# Patient Record
Sex: FEMALE | Race: BLACK OR AFRICAN AMERICAN | Employment: OTHER | URBAN - METROPOLITAN AREA
[De-identification: names, ages, dates, MRNs, and addresses within clinical notes are randomized per-mention and may not be internally consistent; named-entity substitution may affect disease eponyms.]

---

## 2017-03-31 ENCOUNTER — OFFICE VISIT (OUTPATIENT)
Dept: INTERNAL MEDICINE CLINIC | Age: 67
End: 2017-03-31

## 2017-03-31 VITALS
HEIGHT: 66 IN | DIASTOLIC BLOOD PRESSURE: 74 MMHG | BODY MASS INDEX: 47.09 KG/M2 | RESPIRATION RATE: 17 BRPM | SYSTOLIC BLOOD PRESSURE: 132 MMHG | TEMPERATURE: 96.8 F | HEART RATE: 64 BPM | WEIGHT: 293 LBS | OXYGEN SATURATION: 98 %

## 2017-03-31 DIAGNOSIS — I27.82 OTHER CHRONIC PULMONARY EMBOLISM: Primary | ICD-10-CM

## 2017-03-31 DIAGNOSIS — I10 ESSENTIAL HYPERTENSION: ICD-10-CM

## 2017-03-31 DIAGNOSIS — M06.9 RHEUMATOID ARTHRITIS, INVOLVING UNSPECIFIED SITE, UNSPECIFIED RHEUMATOID FACTOR PRESENCE: ICD-10-CM

## 2017-03-31 RX ORDER — METOPROLOL TARTRATE 50 MG/1
TABLET ORAL 2 TIMES DAILY
COMMUNITY
Start: 2016-12-27

## 2017-03-31 RX ORDER — TRAMADOL HYDROCHLORIDE 50 MG/1
50 TABLET ORAL
Refills: 0 | COMMUNITY
Start: 2017-03-03 | End: 2018-01-29

## 2017-03-31 RX ORDER — FOLIC ACID 1 MG/1
TABLET ORAL
COMMUNITY
Start: 2016-12-30 | End: 2017-07-31 | Stop reason: ALTCHOICE

## 2017-03-31 RX ORDER — METHOTREXATE 2.5 MG/1
12.5 TABLET ORAL
COMMUNITY
End: 2018-01-08 | Stop reason: SDUPTHER

## 2017-03-31 RX ORDER — HYDROCODONE BITARTRATE AND ACETAMINOPHEN 7.5; 325 MG/1; MG/1
TABLET ORAL
Refills: 0 | COMMUNITY
Start: 2017-03-04 | End: 2017-07-31 | Stop reason: ALTCHOICE

## 2017-03-31 RX ORDER — FAMOTIDINE 20 MG/1
TABLET, FILM COATED ORAL 2 TIMES DAILY
COMMUNITY
Start: 2017-02-27 | End: 2018-06-18

## 2017-03-31 RX ORDER — PREDNISONE 10 MG/1
10 TABLET ORAL DAILY
COMMUNITY
Start: 2017-02-25 | End: 2017-10-09 | Stop reason: SDUPTHER

## 2017-03-31 NOTE — PROGRESS NOTES
Subjective:     Chief Complaint   Patient presents with   [address]        She  is a 61y.o. year old female with h/o HTN, PE, RA, chronic pain who presents today as a new patient to hospitals. She reports that she was diagnosed with PE two months ago. She was on coumadin initially but last week she was started on Xeralto by her last PCP. She is also diagnosed with RA years ago. Seen a rheumatologist in the past. Currently on methotrexate 2.5 mg and prednisone 10 mg. She mentioned that her PCP was managing her RA for few years. Methotrexate was not adjusted rather she was given percocet and tramadol. She has been taking Tramadol sometimes 6 tabs/day. BP has been well controlled with metoprolol. Strongly advised patient to obtain records from her last PCP, recent hospital visits as well as Rheumatologist.     Beside pain in her back she says she has been feeling fine. A comprehensive review of systems was negative except for that written in the HPI.   Objective:     Vitals:    03/31/17 1049   BP: 132/74   Pulse: 64   Resp: 17   Temp: 96.8 °F (36 °C)   TempSrc: Oral   SpO2: 98%   Weight: 297 lb (134.7 kg)   Height: 5' 6\" (1.676 m)       Physical Examination: General appearance - alert, well appearing, and in no distress, oriented to person, place, and time and overweight  Mental status - alert, oriented to person, place, and time, normal mood, behavior, speech, dress, motor activity, and thought processes  Chest - clear to auscultation, no wheezes, rales or rhonchi, symmetric air entry  Heart - normal rate, regular rhythm, normal S1, S2, no murmurs, rubs, clicks or gallops  Neurological - alert, oriented, normal speech, no focal findings or movement disorder noted    No Known Allergies   Social History     Social History    Marital status:      Spouse name: N/A    Number of children: N/A    Years of education: N/A     Social History Main Topics    Smoking status: Never Smoker    Smokeless tobacco: Never Used    Alcohol use No    Drug use: No    Sexual activity: Not Asked     Other Topics Concern    None     Social History Narrative    None      No family history on file. Past Surgical History:   Procedure Laterality Date    HX KNEE REPLACEMENT Bilateral     HX TUBAL LIGATION        Past Medical History:   Diagnosis Date    Anemia     Hypertension     Osteoarthritis     Pulmonary embolism (HCC)     [facility]       Current Outpatient Prescriptions   Medication Sig Dispense Refill    folic acid (FOLVITE) 1 mg tablet       metoprolol tartrate (LOPRESSOR) 50 mg tablet       predniSONE (DELTASONE) 10 mg tablet       famotidine (PEPCID) 20 mg tablet       ferrous sulfate 140 mg (45 mg iron) TbER ER tablet TK 1 T PO  BID  0    traMADol (ULTRAM) 50 mg tablet   0    HYDROcodone-acetaminophen (NORCO) 7.5-325 mg per tablet take 1 tablet by mouth every 6 hours if needed  0    rivaroxaban (XARELTO) 15 mg tab tablet Take  by mouth daily.  methotrexate (RHEUMATREX) 2.5 mg tablet Take  by mouth. Assessment/ Plan:   Mc Granados was seen today for establish care. Diagnoses and all orders for this visit:    Other chronic pulmonary embolism ([facility]     -- she is currently on Xeralto. Essential hypertension      - well controled. Continue current med. Rheumatoid arthritis, involving unspecified site, unspecified rheumatoid factor presence ([facility]      -  I strongly advised patient to see her rheumatologist as soon as possible. She couldn't recall the name of the physician. Chronic pain: discussed that methotrexate might need to be adjusted to help better control with pain. She agreed with me. She and her daughter agreed to make an appointment with rheumatologist as soon as possible. Will gather all med records and go form there. Medication risks/benefits/costs/interactions/alternatives discussed with patient.   Advised patient to call back or return to office if symptoms worsen/change/persist. If patient cannot reach us or should anything more severe/urgent arise he/she should proceed directly to the nearest emergency department. Discussed expected course/resolution/complications of diagnosis in detail with patient. Patient given a written after visit summary which includes her diagnoses, current medications and vitals. Patient expressed understanding with the diagnosis and plan. Follow-up Disposition:  Return in about 1 month (around 4/30/2017).

## 2017-03-31 NOTE — MR AVS SNAPSHOT
Visit Information Date & Time Provider Department Dept. Phone Encounter #  
 3/31/2017 10:45 AM Daniel Bradley MD [facility] Internal Medicine [phone] [id number] Follow-up Instructions Return in about 1 month (around 4/30/2017). Upcoming Health Maintenance Date Due DTaP/Tdap/Td series (1 - Tdap) 10/2/1973 PAP AKA CERVICAL CYTOLOGY 10/2/1973 BREAST CANCER SCRN MAMMOGRAM 10/2/2002 FOBT Q 1 YEAR AGE 50-75 10/2/2002 ZOSTER VACCINE AGE 60> 10/2/2012 INFLUENZA AGE 9 TO ADULT 8/1/2016 Allergies as of 3/31/2017  Review Complete On: 3/31/2017 By: Patricio Chapman MD  
 No Known Allergies Current Immunizations  Never Reviewed No immunizations on file. Not reviewed this visit You Were Diagnosed With   
  
 Codes Comments Other chronic pulmonary embolism ([facility]    -  Primary ICD-10-CM: I27.82 Essential hypertension     ICD-10-CM: I10 
ICD-9-CM: 401.9 Vitals BP Pulse Temp Resp Height(growth percentile) Weight(growth percentile) 132/74 (BP 1 Location: Left arm, BP Patient Position: Sitting) 64 96.8 °F (36 °C) (Oral) 17 5' 6\" (1.676 m) 297 lb (134.7 kg) SpO2 BMI OB Status Smoking Status 98% 47.94 kg/m2 Postmenopausal Never Smoker Vitals History BMI and BSA Data Body Mass Index Body Surface Area  
 47.94 kg/m 2 2.5 m 2 Preferred Pharmacy Pharmacy Name Phone [facility], [address] [place] [address] [facility] [phone] Your Updated Medication List  
  
   
This list is accurate as of: 3/31/17 11:27 AM.  Always use your most recent med list.  
  
  
  
  
 famotidine 20 mg tablet Commonly known as:  PEPCID  
  
 ferrous sulfate 140 mg (45 mg iron) Tber ER tablet TK 1 T PO  BID  
  
 folic acid 1 mg tablet Commonly known as:  [place] HYDROcodone-acetaminophen 7.5-325 mg per tablet Commonly known as:  NORCO  
take 1 tablet by mouth every 6 hours if needed methotrexate 2.5 mg tablet Commonly known as:  Cecille Bullocks Take  by mouth.  
  
 metoprolol tartrate 50 mg tablet Commonly known as:  LOPRESSOR  
  
 predniSONE 10 mg tablet Commonly known as:  DELTASONE  
  
 traMADol 50 mg tablet Commonly known as:  ULTRAM  
  
 XARELTO 15 mg Tab tablet Generic drug:  rivaroxaban Take  by mouth daily. Follow-up Instructions Return in about 1 month (around 4/30/2017). Introducing [facility] SERVICES! [employer] introduces Edgeio patient portal. Now you can access parts of your medical record, email your doctor's office, and request medication refills online. 1. In your internet browser, go to [email or web address] 2. Click on the First Time User? Click Here link in the Sign In box. You will see the New Member Sign Up page. 3. Enter your Edgeio Access Code exactly as it appears below. You will not need to use this code after youve completed the sign-up process. If you do not sign up before the expiration date, you must request a new code. · Edgeio Access Code: [email or web address] Expires: 6/29/2017 11:26 AM 
 
4. Enter the last four digits of your Social Security Number (xxxx) and Date of Birth (mm/dd/yyyy) as indicated and click Submit. You will be taken to the next sign-up page. 5. Create a Edgeio ID. This will be your Edgeio login ID and cannot be changed, so think of one that is secure and easy to remember. 6. Create a Edgeio password. You can change your password at any time. 7. Enter your Password Reset Question and Answer. This can be used at a later time if you forget your password. 8. Enter your e-mail address. You will receive e-mail notification when new information is available in [address] 9. Click Sign Up. You can now view and download portions of your medical record. 10. Click the Download Summary menu link to download a portable copy of your medical information. If you have questions, please visit the Frequently Asked Questions section of the [employer] website. Remember, [employer] is NOT to be used for urgent needs. For medical emergencies, dial 911. Now available from your iPhone and Android! Please provide this summary of care documentation to your next provider. If you have any questions after today's visit, please call [phone].

## 2017-05-01 ENCOUNTER — TELEPHONE (OUTPATIENT)
Dept: INTERNAL MEDICINE CLINIC | Age: 67
End: 2017-05-01

## 2017-05-01 ENCOUNTER — OFFICE VISIT (OUTPATIENT)
Dept: INTERNAL MEDICINE CLINIC | Age: 67
End: 2017-05-01

## 2017-05-01 VITALS
BODY MASS INDEX: 47.09 KG/M2 | DIASTOLIC BLOOD PRESSURE: 83 MMHG | WEIGHT: 293 LBS | RESPIRATION RATE: 20 BRPM | HEART RATE: 81 BPM | TEMPERATURE: 96.6 F | OXYGEN SATURATION: 95 % | HEIGHT: 66 IN | SYSTOLIC BLOOD PRESSURE: 138 MMHG

## 2017-05-01 DIAGNOSIS — I27.82 OTHER CHRONIC PULMONARY EMBOLISM: Primary | ICD-10-CM

## 2017-05-01 DIAGNOSIS — I10 ESSENTIAL HYPERTENSION: ICD-10-CM

## 2017-05-01 DIAGNOSIS — M06.9 RHEUMATOID ARTHRITIS, INVOLVING UNSPECIFIED SITE, UNSPECIFIED RHEUMATOID FACTOR PRESENCE: Primary | ICD-10-CM

## 2017-05-01 DIAGNOSIS — Z66 DNR (DO NOT RESUSCITATE): ICD-10-CM

## 2017-05-01 DIAGNOSIS — I27.82 OTHER CHRONIC PULMONARY EMBOLISM: ICD-10-CM

## 2017-05-01 RX ORDER — FUROSEMIDE 20 MG/1
20 TABLET ORAL AS NEEDED
COMMUNITY
End: 2017-07-31 | Stop reason: SDUPTHER

## 2017-05-01 RX ORDER — RIVAROXABAN 20 MG/1
TABLET, FILM COATED ORAL
COMMUNITY
Start: 2017-03-29 | End: 2017-07-31 | Stop reason: SDUPTHER

## 2017-05-01 NOTE — TELEPHONE ENCOUNTER
Pt needs a referral for Dr. Alvaro Roland for Hematology, has had 2 PE's    [facility], Appt June 6

## 2017-05-01 NOTE — MR AVS SNAPSHOT
Visit Information Date & Time Provider Department Dept. Phone Encounter #  
 5/1/2017 11:15 AM Daniel Barrera MD [facility] Internal Medicine [phone] [id number] Follow-up Instructions Return in about 3 months (around 8/1/2017). Upcoming Health Maintenance Date Due Hepatitis C Screening 1950 DTaP/Tdap/Td series (1 - Tdap) 10/2/1973 PAP AKA CERVICAL CYTOLOGY 10/2/1973 BREAST CANCER SCRN MAMMOGRAM 10/2/2002 FOBT Q 1 YEAR AGE 50-75 10/2/2002 ZOSTER VACCINE AGE 60> 10/2/2012 INFLUENZA AGE 9 TO ADULT 8/1/2017 Allergies as of 5/1/2017  Review Complete On: 5/1/2017 By: Fallon Olguin MD  
 No Known Allergies Current Immunizations  Never Reviewed No immunizations on file. Not reviewed this visit You Were Diagnosed With   
  
 Codes Comments Rheumatoid arthritis, involving unspecified site, unspecified rheumatoid factor presence ([facility]    -  Primary ICD-10-CM: M06.9 ICD-9-CM: 714.0 DNR (do not resuscitate)     ICD-10-CM: X56 ICD-9-CM: V49.86 Vitals BP Pulse Temp Resp Height(growth percentile) Weight(growth percentile) 138/83 (BP 1 Location: Left arm, BP Patient Position: Sitting) 81 96.6 °F (35.9 °C) (Oral) 20 5' 6\" (1.676 m) 297 lb (134.7 kg) SpO2 BMI OB Status Smoking Status 95% 47.94 kg/m2 Postmenopausal Never Smoker Vitals History BMI and BSA Data Body Mass Index Body Surface Area  
 47.94 kg/m 2 2.5 m 2 Preferred Pharmacy Pharmacy Name Phone [address] [phone] Your Updated Medication List  
  
   
This list is accurate as of: 5/1/17 11:59 AM.  Always use your most recent med list.  
  
  
  
  
 famotidine 20 mg tablet Commonly known as:  PEPCID  
  
 ferrous sulfate 140 mg (45 mg iron) Tber ER tablet TK 1 T PO  BID  
  
 folic acid 1 mg tablet Commonly known as:  FOLVITE  
  
 furosemide 20 mg tablet Commonly known as:  LASIX Take  by mouth daily. HYDROcodone-acetaminophen 7.5-325 mg per tablet Commonly known as:  [place] Luis  
take 1 tablet by mouth every 6 hours if needed  
  
 methotrexate 2.5 mg tablet Commonly known as:  [place]lysses Take  by mouth.  
  
 metoprolol tartrate 50 mg tablet Commonly known as:  LOPRESSOR  
  
 predniSONE 10 mg tablet Commonly known as:  DELTASONE  
  
 traMADol 50 mg tablet Commonly known as:  ULTRAM  
  
 XARELTO 20 mg Tab tablet Generic drug:  rivaroxaban We Performed the Following DO NOT RESUSCITATE Pietro Brandon REFERRAL TO RHEUMATOLOGY [PIE53 Custom] Follow-up Instructions Return in about 3 months (around 8/1/2017). Referral Information Referral ID Referred By Referred To  
  
 [id number] DEVON MONTELONGO MD   
   [address] [address], [address] [place] [address] Road Phone: [phone] Fax: [phone] Visits Status Start Date End Date 1 New Request 5/1/17 5/1/18 If your referral has a status of pending review or denied, additional information will be sent to support the outcome of this decision. Introducing [facility]! [employer]  introduces Swifto patient portal. Now you can access parts of your medical record, email your doctor's office, and request medication refills online. 1. In your internet browser, go to [email or web address] 2. Click on the First Time User? Click Here link in the Sign In box. You will see the New Member Sign Up page. 3. Enter your Swifto Access Code exactly as it appears below. You will not need to use this code after youve completed the sign-up process. If you do not sign up before the expiration date, you must request a new code. · Swifto Access Code: [email or web address] Expires: 6/29/2017 11:26 AM 
 
4.  Enter the last four digits of your Social Security Number (xxxx) and Date of Birth (mm/dd/yyyy) as indicated and click Submit. You will be taken to the next sign-up page. 5. Create a TitanFile ID. This will be your TitanFile login ID and cannot be changed, so think of one that is secure and easy to remember. 6. Create a TitanFile password. You can change your password at any time. 7. Enter your Password Reset Question and Answer. This can be used at a later time if you forget your password. 8. Enter your e-mail address. You will receive e-mail notification when new information is available in [address] 9. Click Sign Up. You can now view and download portions of your medical record. 10. Click the Download Summary menu link to download a portable copy of your medical information. If you have questions, please visit the Frequently Asked Questions section of the TitanFile website. Remember, TitanFile is NOT to be used for urgent needs. For medical emergencies, dial 911. Now available from your iPhone and Android! Please provide this summary of care documentation to your next provider. If you have any questions after today's visit, please call [phone].

## 2017-05-01 NOTE — PROGRESS NOTES
Subjective:     Chief Complaint   Patient presents with    Hypertension        She  is a 61y.o. year old female who presents with her daughter to get referral for rheumatologist as well as for the hematologist as well as follow up on blood pressure. Her past medical history is significant for RA, PE, HTN. In Jan/2017 she was diagnosed with 2 nd PE, started on Xeralto since then. Doing well on that. She has bene following up with hematologist regularly. Next appointment is this Thursday for blood work. She was also diagnosed with RA many years ago at [facility] and after that she was followed by rheumatologist initially but for the past years her PCP been managing her RA with methotrexate 12.5 mg weekly as well as Prednisone 10 mg/day. She is currently on tramadol as well as norco which has bene prescribed by PCP. Patient did see her last PCP 3 days ago to get the refill. Patients's plan is to transfer her care with me as soon as we obtain all her records from PCP. BP has been well controlled with metoprolol. She takes lasix as needed for leg edema. She denies any soa, chest pain, leg swelling, abd pain. Pertinent items are noted in HPI.   Objective:     Vitals:    05/01/17 1123   BP: 138/83   Pulse: 81   Resp: 20   Temp: 96.6 °F (35.9 °C)   TempSrc: Oral   SpO2: 95%   Weight: 297 lb (134.7 kg)   Height: 5' 6\" (1.676 m)       Physical Examination: General appearance - alert, well appearing, and in no distress, oriented to person, place, and time and overweight  Mental status - alert, oriented to person, place, and time, normal mood, behavior, speech, dress, motor activity, and thought processes  Chest - clear to auscultation, no wheezes, rales or rhonchi, symmetric air entry  Heart - normal rate, regular rhythm, normal S1, S2, no murmurs, rubs, clicks or gallops  Extremities - no pedal edema noted    No Known Allergies   Social History     Social History    Marital status:      Spouse name: N/A    Number of children: N/A    Years of education: N/A     Social History Main Topics    Smoking status: Never Smoker    Smokeless tobacco: Never Used    Alcohol use No    Drug use: No    Sexual activity: Not Asked     Other Topics Concern    None     Social History Narrative      History reviewed. No pertinent family history. Past Surgical History:   Procedure Laterality Date    HX KNEE REPLACEMENT Bilateral     HX TUBAL LIGATION        Past Medical History:   Diagnosis Date    Anemia     Hypertension     Osteoarthritis     Pulmonary embolism (HCC)     Rheumatoid arthritis (HCC)       Current Outpatient Prescriptions   Medication Sig Dispense Refill    XARELTO 20 mg tab tablet       furosemide (LASIX) 20 mg tablet Take  by mouth daily.  folic acid (FOLVITE) 1 mg tablet       metoprolol tartrate (LOPRESSOR) 50 mg tablet       predniSONE (DELTASONE) 10 mg tablet       famotidine (PEPCID) 20 mg tablet       traMADol (ULTRAM) 50 mg tablet   0    HYDROcodone-acetaminophen (NORCO) 7.5-325 mg per tablet take 1 tablet by mouth every 6 hours if needed  0    methotrexate (RHEUMATREX) 2.5 mg tablet Take  by mouth.  ferrous sulfate 140 mg (45 mg iron) TbER ER tablet TK 1 T PO  BID  0        Assessment/ Plan:   Charly Andrade was seen today for hypertension. Diagnoses and all orders for this visit:    Rheumatoid arthritis, involving unspecified site, unspecified rheumatoid factor presence ([facility]  -     REFERRAL TO RHEUMATOLOGY  -    Strongly advised patient to have a follow up with rheumatologist for management of her RA and chronic pain. DNR (do not resuscitate)  -     Patient states that she is DNR  -     DO NOT RESUSCITATE    Essential hypertension        -   Well controlled. Continue current med. Other chronic pulmonary embolism (HCC)        - continue Xarelto 20 mg daily. - hematology referral done.          Medication risks/benefits/costs/interactions/alternatives discussed with patient. Advised patient to call back or return to office if symptoms worsen/change/persist. If patient cannot reach us or should anything more severe/urgent arise he/she should proceed directly to the nearest emergency department. Discussed expected course/resolution/complications of diagnosis in detail with patient. Patient given a written after visit summary which includes her diagnoses, current medications and vitals. Patient expressed understanding with the diagnosis and plan. Follow-up Disposition:  Return in about 3 months (around 8/1/2017).

## 2017-06-05 ENCOUNTER — TELEPHONE (OUTPATIENT)
Dept: INTERNAL MEDICINE CLINIC | Age: 67
End: 2017-06-05

## 2017-07-26 ENCOUNTER — TELEPHONE (OUTPATIENT)
Dept: RHEUMATOLOGY | Age: 67
End: 2017-07-26

## 2017-07-31 ENCOUNTER — OFFICE VISIT (OUTPATIENT)
Dept: RHEUMATOLOGY | Age: 67
End: 2017-07-31

## 2017-07-31 ENCOUNTER — OFFICE VISIT (OUTPATIENT)
Dept: INTERNAL MEDICINE CLINIC | Age: 67
End: 2017-07-31

## 2017-07-31 VITALS
RESPIRATION RATE: 18 BRPM | DIASTOLIC BLOOD PRESSURE: 63 MMHG | HEIGHT: 66 IN | HEART RATE: 84 BPM | WEIGHT: 293 LBS | TEMPERATURE: 98.1 F | BODY MASS INDEX: 47.09 KG/M2 | OXYGEN SATURATION: 97 % | SYSTOLIC BLOOD PRESSURE: 126 MMHG

## 2017-07-31 VITALS
BODY MASS INDEX: 47.09 KG/M2 | DIASTOLIC BLOOD PRESSURE: 76 MMHG | WEIGHT: 293 LBS | RESPIRATION RATE: 18 BRPM | HEART RATE: 87 BPM | HEIGHT: 66 IN | TEMPERATURE: 98.6 F | SYSTOLIC BLOOD PRESSURE: 122 MMHG

## 2017-07-31 DIAGNOSIS — Z71.89 DNR (DO NOT RESUSCITATE) DISCUSSION: ICD-10-CM

## 2017-07-31 DIAGNOSIS — Z79.52 LONG TERM (CURRENT) USE OF SYSTEMIC STEROIDS: ICD-10-CM

## 2017-07-31 DIAGNOSIS — Z78.0 POSTMENOPAUSAL: ICD-10-CM

## 2017-07-31 DIAGNOSIS — Z00.00 MEDICARE ANNUAL WELLNESS VISIT, SUBSEQUENT: Primary | ICD-10-CM

## 2017-07-31 DIAGNOSIS — I10 ESSENTIAL HYPERTENSION: ICD-10-CM

## 2017-07-31 DIAGNOSIS — M05.79 SEROPOSITIVE RHEUMATOID ARTHRITIS OF MULTIPLE SITES (HCC): Primary | ICD-10-CM

## 2017-07-31 DIAGNOSIS — Z79.60 LONG-TERM USE OF IMMUNOSUPPRESSANT MEDICATION: ICD-10-CM

## 2017-07-31 DIAGNOSIS — M79.7 FIBROMYALGIA: ICD-10-CM

## 2017-07-31 DIAGNOSIS — M89.9 DISORDER OF BONE: ICD-10-CM

## 2017-07-31 DIAGNOSIS — M79.89 LEG SWELLING: ICD-10-CM

## 2017-07-31 DIAGNOSIS — Z00.00 ROUTINE GENERAL MEDICAL EXAMINATION AT A HEALTH CARE FACILITY: ICD-10-CM

## 2017-07-31 DIAGNOSIS — Z13.39 SCREENING FOR ALCOHOLISM: ICD-10-CM

## 2017-07-31 DIAGNOSIS — E66.01 MORBID OBESITY WITH BMI OF 45.0-49.9, ADULT (HCC): ICD-10-CM

## 2017-07-31 DIAGNOSIS — I27.82 OTHER CHRONIC PULMONARY EMBOLISM: ICD-10-CM

## 2017-07-31 DIAGNOSIS — M06.9 RHEUMATOID ARTHRITIS, INVOLVING UNSPECIFIED SITE, UNSPECIFIED RHEUMATOID FACTOR PRESENCE: ICD-10-CM

## 2017-07-31 DIAGNOSIS — Z79.52 ON PREDNISONE THERAPY: ICD-10-CM

## 2017-07-31 DIAGNOSIS — E66.01 MORBID OBESITY, UNSPECIFIED OBESITY TYPE (HCC): ICD-10-CM

## 2017-07-31 RX ORDER — DIPHENHYDRAMINE HCL 25 MG
25 TABLET ORAL
COMMUNITY
End: 2018-03-15

## 2017-07-31 RX ORDER — BISMUTH SUBSALICYLATE 262 MG
1 TABLET,CHEWABLE ORAL DAILY
COMMUNITY

## 2017-07-31 RX ORDER — HYDROCODONE BITARTRATE AND ACETAMINOPHEN 7.5; 325 MG/1; MG/1
TABLET ORAL
COMMUNITY
End: 2017-07-31

## 2017-07-31 RX ORDER — FUROSEMIDE 20 MG/1
20 TABLET ORAL AS NEEDED
Qty: 90 TAB | Refills: 0 | Status: SHIPPED | OUTPATIENT
Start: 2017-07-31 | End: 2017-08-02 | Stop reason: SDUPTHER

## 2017-07-31 RX ORDER — RIVAROXABAN 20 MG/1
20 TABLET, FILM COATED ORAL
Qty: 90 TAB | Refills: 0 | Status: SHIPPED | OUTPATIENT
Start: 2017-07-31 | End: 2021-04-07 | Stop reason: ALTCHOICE

## 2017-07-31 RX ORDER — FOLIC ACID 1 MG/1
TABLET ORAL DAILY
COMMUNITY
End: 2018-01-08 | Stop reason: SDUPTHER

## 2017-07-31 NOTE — PROGRESS NOTES
This is a Subsequent Medicare Annual Wellness Visit providing Personalized Prevention Plan Services (PPPS) (Performed 12 months after initial AWV and PPPS )    I have reviewed the patient's medical history in detail and updated the computerized patient record. 60 y/o F is here with her daughter Ms. Tressa Pettit for [employer] visit. Her past medical history is significant for PE, RA. She is currently on Xarelto. Has been following up with hematologist regularly. According to patient she was diagnosed with RA many years ago. Not a very good historian. Sometimes she was telling me that her PCP diagnosed her with RA by doing a back Xray and started on medication. In last visit she did tell me that she was diagnosed and managed by rheumatologist years ago and leate managed by her PCP. Daughter did not have the clear info either. She also told me that she was diagnosed with Fibromyalgia. She was on hydrocodone until last month. Takes Tramadol daily for the pain. Prednisone helps with pain especially her both hand pain. She has an appointment with rheumatologist today. History     Past Medical History:   Diagnosis Date    Anemia     Hypertension     Osteoarthritis     Pulmonary embolism (HCC)     Rheumatoid arthritis ([address]       Past Surgical History:   Procedure Laterality Date    HX KNEE REPLACEMENT Bilateral     HX TUBAL LIGATION       Current Outpatient Prescriptions   Medication Sig Dispense Refill    IRON, FERROUS SULFATE, PO Take 65 mg by mouth two (2) times a day.  XARELTO 20 mg tab tablet       furosemide (LASIX) 20 mg tablet Take 20 mg by mouth as needed.  metoprolol tartrate (LOPRESSOR) 50 mg tablet       predniSONE (DELTASONE) 10 mg tablet       famotidine (PEPCID) 20 mg tablet       traMADol (ULTRAM) 50 mg tablet   0    methotrexate (RHEUMATREX) 2.5 mg tablet Take  by mouth. No Known Allergies  History reviewed. No pertinent family history.   Social History Substance Use Topics    Smoking status: Never Smoker    Smokeless tobacco: Never Used    Alcohol use No     Patient Active Problem List   Diagnosis Code    Chronic pulmonary embolism (HCC) I27.82    Essential hypertension I10    Rheumatoid arthritis ([facility] M06.9    DNR (do not resuscitate) Z66    Morbid obesity ([facility] E66.01       Depression Risk Factor Screening:     PHQ over the last two weeks 5/1/2017   Little interest or pleasure in doing things Not at all   Feeling down, depressed or hopeless Not at all   Total Score PHQ 2 0     Alcohol Risk Factor Screening: On any occasion during the past 3 months, have you had more than 3 drinks containing alcohol? No    Do you average more than 7 drinks per week? No        Functional Ability and Level of Safety:     Hearing Loss   none    Activities of Daily Living   Self-care. Requires assistance with: no ADLs    Fall Risk   No flowsheet data found. Abuse Screen   Patient is not abused    Review of Systems   Constitutional: negative for fevers, chills and fatigue  Respiratory: negative for cough, pneumonia or wheezing  Gastrointestinal: negative for nausea, vomiting and change in bowel habits  Genitourinary:negative for frequency, dysuria and urinary incontinence  Musculoskeletal:positive for myalgias, arthralgias, stiff joints, back pain and bone pain    Physical Examination     Evaluation of Cognitive Function:  Mood/affect:  neutral  Appearance: age appropriate and obese, pleasant. Family member/caregiver input: daughter was here with her today. No specific concern. Visit Vitals    /63 (BP 1 Location: Left arm, BP Patient Position: Sitting)    Pulse 84    Temp 98.1 °F (36.7 °C) (Oral)    Resp 18    Ht 5' 6\" (1.676 m)    Wt 305 lb (138.3 kg)    SpO2 97%    BMI 49.23 kg/m2     General appearance: alert, cooperative, no distress, appears stated age  Ears: normal TM's and external ear canals AU  Nose: Nares normal. Septum midline.  Mucosa normal. No drainage or sinus tenderness. Throat: Lips, mucosa, and tongue normal. Teeth and gums normal  Lungs: clear to auscultation bilaterally  Heart: regular rate and rhythm, S1, S2 normal, no murmur, click, rub or gallop  Extremities: edema 2+    Patient Care Team:  Daya Plaza MD (Orthopedic Surgery)  F[place] Hill MD (Rheumatology)  Kristie Escamilla MD (Vascular Surgery)  Rosa M Deleon MD (Hematology and Oncology)    Advice/Referrals/Counseling   Education and counseling provided:  End-of-Life planning (with patient's consent)  Pneumococcal Vaccine  Screening Pap and pelvic (covered once every 2 years)  Colorectal cancer screening tests  Bone mass measurement (DEXA)  Screening for glaucoma      Assessment/Plan       ICD-10-CM ICD-9-CM    1. Medicare annual wellness visit, subsequent Z00.00 V70.0 HEMOGLOBIN A1C WITH EAG      CBC WITH AUTOMATED DIFF      METABOLIC PANEL, COMPREHENSIVE      LIPID PANEL      TSH AND FREE T4   2. On prednisone therapy Z79.52 V58.65 DEXA BONE DENSITY STUDY AXIAL   3. Postmenopausal Z78.0 V49.81 DEXA BONE DENSITY STUDY AXIAL   4. Morbid obesity, unspecified obesity type ([facility] E66.01 278.01 TSH AND FREE T4   5. Routine general medical examination at a health care facility Z00.00 V70.0 HEPATITIS C AB   6. Screening for alcoholism Z13.89 V79.1    7. Leg swelling M79.89 729.81    8. Rheumatoid arthritis, involving unspecified site, unspecified rheumatoid factor presence (HCC) M06.9 714.0 XARELTO 20 mg tab tablet   9. Other chronic pulmonary embolism (HCC) I27.82 416.2      Diagnoses and all orders for this visit:    1. Medicare annual wellness visit, subsequent  -     HEMOGLOBIN A1C WITH EAG  -     CBC WITH AUTOMATED DIFF  -     METABOLIC PANEL, COMPREHENSIVE  -     LIPID PANEL  -     TSH AND FREE T4    2. On prednisone therapy  -     DEXA BONE DENSITY STUDY AXIAL; Future    3. Postmenopausal  -     DEXA BONE DENSITY STUDY AXIAL; Future    4.  Morbid obesity, unspecified obesity type (HCC)  -     TSH AND FREE T4    5. Routine general medical examination at a health care facility  -     HEPATITIS C AB    6. Screening for alcoholism    7. Leg swelling  -     Advised for exercise, low salt diet, leg elevation. compression stockings. -    furosemide (LASIX) 20 mg tablet; Take 1 Tab by mouth as needed. 8. Rheumatoid arthritis, involving unspecified site, unspecified rheumatoid factor presence ([facility]        -  Has an appointment today with new rheumatologist.   9. Other chronic pulmonary embolism ([facility]  -     XARELTO 20 mg tab tablet; Take 1 Tab by mouth daily (with breakfast). 10. DNR (do not resuscitate) discussion  -     DO NOT RESUSCITATE    11. Essential hypertension         -   Continue metoprolol. Follow-up Disposition:  Return in about 6 months (around 1/31/2018). reviewed diet, exercise and weight control  cardiovascular risk and specific lipid/LDL goals reviewed  reviewed medications and side effects in detail.

## 2017-07-31 NOTE — PROGRESS NOTES
REASON FOR VISIT    This is an evaluation for Ms. Nii Katz a 61 y.o.  female for diffuse pain. The patient is referred to the [facility] and [address] [place] [address] at the request of Dr. Debi Griffin. HISTORY OF PRESENT ILLNESS     I have reviewed and summarized old records from [facility] and [facility]    She has a history of bilateral knee replacement. In 2005, she developed diffuse pain. She was diagnosed by Dr. Lacey Suresh with fibromyalgia but she had been diagnosed by [facility] with Rheumatoid Arthritis. For the past 20 years, she has been on methotrexate 12.5 mg every week as per previous PCP for her back pain without relief. She was told she had Rheumatoid Arthritis in her spine by her PCP. She was treated with pain medication. She then saw another rheumatologist who also said had fibromyalgia. She also has trigger finger. She has a family history of Rheumatoid Arthritis in her sister. In 11/29/2013, labs showed rheumatoid factor 25.3. In 4/26/2016, MRI Lumbar Spine with and without contrast showed study is suboptimal secondary to patient's body habitus. T12-L1: Normal for age. L1-L2: Normal for age. L2-L3: There is mild facet joint arthropathy. L3-L4: There is mild disc disease with concentric bulge. Moderate to severe facet arthropathy is present with bony hypertrophy and ligamentous thickening. There is moderate central canal stenosis and lateral recess narrowing. Bilateral foraminal narrowing is present. There is absence of fat within the right neural foramen suggesting involvement of the exiting L3 nerve root. L4-L5: There is mild disc disease with loss of disc height. Moderate to severe facet arthropathy is present with bony hypertrophy and ligamentous thickening. There is moderate central canal stenosis primarily in a transverse direction. Severe lateral recess narrowing is present with moderate bilateral foraminal narrowing.  Absence of fat within the right neural foramen suggests involvement of the exiting right L4 nerve root. L5-S1: There is mild disc degeneration with loss of disc height. Moderate to severe facet arthropathy is present with bony hypertrophy and ligamentous thickening. No significant canal stenosis. There is mild lateral recess narrowing. Bilateral neural foraminal narrowing is also present, right greater than left. Absence of fat within the right neural foramen suggests involvement of the exiting right L5 nerve root. Visualized portions of the sacrum are normal. There is no abnormal enhancement. The conus is normal in contour and signal characteristics. Paraspinal soft tissues are unremarkable. Right hip radiograph showed no fracture or dislocation. The right hip joint spaces normal and symmetric with the left side. Enthesophytes are seen along either iliac crest and there is relatively severe bilateral facet hypertrophy at L5-S1. The sacroiliac joints appear grossly normal. Right Femur radiograph showed the patient is status post prior placement of a 3 component right total knee prosthesis. From this examination a full assessment of the prosthesis is limited. Grossly this examination is negative for a loosening of the prosthetic components, heterotopic ossification, or additional complications of the prosthesis. The joint spaces of the right hip are well maintained without significant osteoarthritis. This examination is negative for a fracture or an insufficiency fracture of the proximal femur. This examination examination is negative for focal lytic or blastic lesions of the right femur. In 1/26/2017, labs showed negative NAVA, beta-2-glycoprotein-1, anti-cardiolipin, ESR 45 mm/hr, CRP 3.80 mg/dL, TSH 0.197, FT3 1.39    In 1/28/2017, CT abdomen and pelvis without contrast showed . there are linear densities at both lung bases suggesting scarring or subsegmental atelectasis. The lung bases are otherwise clear no pleural effusion is seen.  The liver spleen and pancreas are unremarkable. No renal stone or mass is seen. No ureteral dilatation or stone is seen. The urinary bladder is unremarkable. A urinary catheter is noted with tip within the urinary bladder. Small calcifications within the uterus are again noted. The uterus and pelvic adnexa are otherwise unremarkable. No dilated bowel or free air or free fluid is seen. Specifically there is no evidence of retroperitoneal or intra-abdominal hemorrhage. An inferior vena cava filter is noted. In 1/30/2017, chest radiograph showed the lungs remain clear. No pneumothorax or pleural effusion apparent. Cardiac silhouette remains large. Endotracheal tube and nasogastric tube have been removed. Left central line stable in position with tip projecting over the superior vena cava. In 2/22/2017, labs showed WBC 5.82, lymphocytes 0.82, Hct 30.6%, platelets 595,307. Today, she is on prednisone 10 mg alternating with 20 mg and on methotrexate 12.5 mg weekly as per her previous PCP for back pain, without relief. She has had been on prednisone for years. Her pain did not improve with her back pain. Her localized non-radiating back pain is more active when she mobile. It is a hurt. Rest makes it better. She has not had physical therapy. She has gained 40 lbs. If she does not take prednisone, in the morning she has pain     Therapy History includes:  Current DMARD therapy include: methotrexate 12.5 mg weekly. Prior DMARD therapy include: gold (one year)  Discontinued DMARDs because of inefficacy: gold  Discontinued DMARDs because of side effects: None    REVIEW OF SYSTEMS    A 15 point review of systems was performed and summarized below. The questionnaire was reviewed with the patient and scanned into the patient's medical record.     General: endorses night sweats, denies recent weight gain, recent weight loss, fatigue, weakness, fever  Musculoskeletal: endorses back pain denies joint pain, joint swelling, morning stiffness, muscle weakness  Ears: denies ringing in ears, loss of hearing, deafness  Eyes: endorses blurred vision, denies pain, redness, loss of vision, double vision, dryness, foreign body sensation  Mouth: endorses increased dental caries, denies sore tongue, oral ulcers, bleeding gums, loss of taste, dryness  Nose: denies nosebleeds, loss of smell, nasal ulcers  Throat: denies frequent sore throats, hoarseness, difficulty in swallowing, pain in jaw while chewing  Neck: denies swollen glands, tender glands  Cardiopulmonary: endorses irregular heart beat, shortness of breath, swollen legs or feet, denies pain in chest, sudden changes in heart beat, difficulty breathing at night, cough, coughing of blood, wheezing  Gastrointestinal: endorses increasing constipation, denies nausea, heartburn, stomach pain relieved by food, vomiting of blood/\"coffee grounds\", jaundice, persistent diarrhea, blood in stools, black stools  Genitourinary: endorses getting up at night to pass urine, denies difficult urination, pain or burning on urination, blood in urine, cloudy urine, pus in urine, genital discharge, frequent urination, vaginal dryness, rash/ulcers, sexual difficulties   Hematologic: endorses blood clots, denies anemia, bleeding tendency  Skin: endorses easy bruising from Xarelto, hives, sun sensitive, hair loss on the top of her head, denies redness, rash, skin tightness, nodules/bumps, color changes of hands or feet in the cold (Raynaud's)  Neurologic: endorses numbness or tingling in right hands, muscle weakness in right hand, denies headaches, dizziness, fainting of loss of consciousness, memory loss  Psychiatric: denies depression, excessive worries  Sleep: endorses poor sleep (6 hours), denies snoring, daytime somnolence, difficulty falling asleep, difficulty staying asleep     PAST MEDICAL HISTORY    She has a past medical history of Anemia; Fibromyalgia;  Hypertension; Osteoarthritis; Osteoporosis; Pulmonary embolism ([facility] and Rheumatoid arthritis ([facility] FAMILY HISTORY    Her family history includes Arthritis-rheumatoid in her mother; Cancer in her father; Diabetes in her maternal grandmother and son; Heart Attack in her paternal grandmother; Schizophrenia in her son. SOCIAL HISTORY    She reports that she has never smoked. She has never used smokeless tobacco. She reports that she does not drink alcohol or use illicit drugs. GYNECOLOGIC HISTORY     4, Para 4, Living 4, Miscarriage 0    She denies severe pre-eclampsia, eclampsia or placental insufficiency    HEALTH MAINTENANCE    Immunizations    There is no immunization history on file for this patient. MEDICATIONS    Current Outpatient Prescriptions   Medication Sig Dispense Refill    IRON, FERROUS SULFATE, PO Take 65 mg by mouth four (4) times daily.  furosemide (LASIX) 20 mg tablet Take 1 Tab by mouth as needed. 90 Tab 0    XARELTO 20 mg tab tablet Take 1 Tab by mouth daily (with breakfast). 90 Tab 0    folic acid (FOLVITE) 1 mg tablet Take  by mouth daily.  diphenhydrAMINE (BENADRYL ALLERGY) 25 mg tablet Take 25 mg by mouth every six (6) hours as needed.  multivitamin (ONE A DAY) tablet Take 1 Tab by mouth daily.  bisacodyl 5 mg tab Take  by mouth as needed.  metoprolol tartrate (LOPRESSOR) 50 mg tablet two (2) times a day.  predniSONE (DELTASONE) 10 mg tablet Take 15 mg by mouth daily.  famotidine (PEPCID) 20 mg tablet two (2) times a day.  traMADol (ULTRAM) 50 mg tablet 50 mg every six (6) hours as needed. 0    methotrexate (RHEUMATREX) 2.5 mg tablet Take 12.5 mg by mouth every Wednesday. ALLERGIES    No Known Allergies    PHYSICAL EXAMINATION    Visit Vitals    /76 (BP 1 Location: Left arm, BP Patient Position: Sitting)    Pulse 87    Temp 98.6 °F (37 °C)    Resp 18    Ht 5' 6\" (1.676 m)    Wt 302 lb (137 kg)    BMI 48.74 kg/m2     Body mass index is 48.74 kg/(m^2).     General: Patient is alert, oriented x 3, not in acute distress, daughter at bedside    HEENT:   Conjunctiva are not injected and appear moist, oral mucous membranes are moist, there are no ulcers present, there is no alopecia, neck is supple, there is no lymphadenopathy. Salivary glands are normal    Cardiovascular:  Heart is regular rate and rhythm, no murmurs. Chest:  Lungs are clear to auscultation bilaterally. Abdomen:  Soft, non-tender , obese. Extremities:  Free of clubbing, cyanosis, edema, extremities well perfused. Neurological exam:  No focal sensory deficits, muscle strength is full in upper and lower extremities. Skin exam:  There are no rashes, no psoriasis, no active Raynaud's, no livedo reticularis, no periungual erythema. Musculoskeletal exam:  A comprehensive musculoskeletal exam was performed for all joints of each upper and lower extremity and assessed for swelling, tenderness and range of motion. Decreased ROM of wrists without pain or swelling  Left elbow flexion deformity  Decreased ROM of ankles without pain or swelling  Arm myalgia  10/18 tender points. Z-Deformities: none  [place] Deformities: none  [place]re's Deformities: none  Ulnar Deviation: none    Joint Count 7/31/2017   MHAQ 0.125         DATA REVIEW    Studies Reviewed:     Prior medical records were reviewed and are summarized as below:    Laboratory data: summarized in the HPI    Imaging: summarized in the HPI. ASSESSMENT AND PLAN    1) Seropositive Rheumatoid Arthritis. She has a long standing history of Rheumatoid Arthritis and has been on methotrexate 12.5 mg weekly for more than 10 years in addition to prednisone 10 mg to 20 mg for years. She notes that if her prednisone dose is 10 mg, she feels pain in the morning, which is suggestive for active inflammation. She had not active synovitis today but has evidence of joint damage. I will check labs and radiographs today.  I asked her to take 15 mg prednisone daily and I will wean her down. I will optimized her DMARD after I review her labs. 2) Long Term Use of Immunosuppressants. The patient remains on immunomodulatory medications (methotrexate) and requires frequent toxicity monitoring by blood work. Respective labs were ordered (CBC and CMP). 3) Long Term Use of Systemic Steroids (Prednisone). She has been taking prednisone for years and is currently alternating 20 mg and 10 mg by day. We discussed the risks, benefits and potential side effects of prednisone including but not limited to osteoporosis, infections, weight gain, fluid retention, hyperglycemia, cataract, thinning of hair, avascular necrosis, mood changes and sleep disturbances. I decreased to 15 mg daily with plans to wean off. DXA was ordered. 4) Fibromyalgia. Her history, constellation of symptoms, and examination are consistent with a pain syndrome, possiblity secondary to Rheumatoid Arthritis    We discussed treating secondary causes, such as sleep apnea, poor sleep quality, depression, anxiety, weight loss, vitamin deficiencies, such as vitamin D, and pursuing aquatherapy. I encouraged her to do [address].     5) Morbid Obesity. Her BMI was 48.74. Weight loss is recommended. The patient voiced understanding of the aforementioned assessment and plan. Summary of plan was provided in the After Visit Summary patient instructions. I also provided education about MyChart setup and utility.     TODAY'S ORDERS    Orders Placed This Encounter    QUANTIFERON TB GOLD    XR ELBOW LT MIN 3 V    XR FOOT LT MIN 3 V    XR FOOT RT MIN 3 V    XR HAND LT MIN 3 V    XR HAND RT MIN 3 V    CYCLIC CITRUL PEPTIDE AB, IGG    CHRONIC HEPATITIS PANEL    C REACTIVE PROTEIN, QT    SED RATE (ESR)    RHEUMATOID FACTOR, QL    PROTEIN ELECTROPHORESIS W/ REFLX BELGICA    UA/M W/RFLX CULTURE, ROUTINE    URIC ACID    PROT+CREATU (RANDOM)    VITAMIN D, 25 HYDROXY    folic acid (FOLVITE) 1 mg tablet       Future Appointments  Date Time Provider Constance Lois   8/14/2017 2:00 PM MD Pete Ramirez MD, [facility]    Adult Rheumatology   Musculoskeletal Ultrasound Certified  [address] [place] [address]   Phone [phone]  Fax [phone]

## 2017-07-31 NOTE — MR AVS SNAPSHOT
Visit Information Date & Time Provider Department Dept. Phone Encounter #  
 7/31/2017 11:15 AM Daniel Hugo MD [facility] [phone] [id number] Follow-up Instructions Return in about 6 months (around 1/31/2018). Your Appointments 7/31/2017  1:00 PM  
New Patient with Edda Gray MD  
[address] Syl[address] Silverman ([facility]) Appt Note: NP appointment, AB, referred by Dr. Missy Munroe [phone], 05/10/07; . ..  
 [address] [place] [address] [address]  
[phone]  
  
   
 [address] [facility] [address] [facility] [address] Upcoming Health Maintenance Date Due Hepatitis C Screening 1950 DTaP/Tdap/Td series (1 - Tdap) 10/2/1973 ZOSTER VACCINE AGE 60> 8/2/2012 INFLUENZA AGE 9 TO ADULT 8/1/2017 BREAST CANCER SCRN MAMMOGRAM 7/31/2018 PAP AKA CERVICAL CYTOLOGY 7/31/2020 COLONOSCOPY 7/31/2027 Allergies as of 7/31/2017  Review Complete On: 7/31/2017 By: Malorie Campbell MD  
 No Known Allergies Current Immunizations  Never Reviewed No immunizations on file. Not reviewed this visit You Were Diagnosed With   
  
 Codes Comments Medicare annual wellness visit, subsequent    -  Primary ICD-10-CM: Z00.00 ICD-9-CM: V70.0 On prednisone therapy     ICD-10-CM: Z79.52 
ICD-9-CM: V58.65 Postmenopausal     ICD-10-CM: Z78.0 ICD-9-CM: V49.81 Morbid obesity, unspecified obesity type ([address]     ICD-10-CM: E66.01 
ICD-9-CM: 278.01 Routine general medical examination at a health care facility     ICD-10-CM: Z00.00 ICD-9-CM: V70.0 Screening for alcoholism     ICD-10-CM: Z13.89 ICD-9-CM: V79.1 Vitals BP Pulse Temp Resp Height(growth percentile) Weight(growth percentile) 126/63 (BP 1 Location: Left arm, BP Patient Position: Sitting) 84 98.1 °F (36.7 °C) (Oral) 18 5' 6\" (1.676 m) 305 lb (138.3 kg) SpO2 BMI OB Status Smoking Status 97% 49.23 kg/m2 Postmenopausal Never Smoker Vitals History BMI and BSA Data Body Mass Index Body Surface Area  
 49.23 kg/m 2 2.54 m 2 Preferred Pharmacy Pharmacy Name Phone Lui Mcdonald [address] [place] [address] [facility] [address] [place] [address] [phone] Your Updated Medication List  
  
   
This list is accurate as of: 7/31/17 11:43 AM.  Always use your most recent med list.  
  
  
  
  
 famotidine 20 mg tablet Commonly known as:  PEPCID  
  
 furosemide 20 mg tablet Commonly known as:  LASIX Take 20 mg by mouth as needed. IRON (FERROUS SULFATE) PO Take 65 mg by mouth two (2) times a day. methotrexate 2.5 mg tablet Commonly known as:  [place] Take  by mouth.  
  
 metoprolol tartrate 50 mg tablet Commonly known as:  LOPRESSOR  
  
 predniSONE 10 mg tablet Commonly known as:  DELTASONE  
  
 traMADol 50 mg tablet Commonly known as:  ULTRAM  
  
 XARELTO 20 mg Tab tablet Generic drug:  rivaroxaban We Performed the Following CBC WITH AUTOMATED DIFF [15953 CPT(R)] HEMOGLOBIN A1C WITH EAG [90072 CPT(R)] HEPATITIS C AB [42675 CPT(R)] LIPID PANEL [06861 CPT(R)] METABOLIC PANEL, COMPREHENSIVE [72408 CPT(R)] TSH AND FREE T4 [81637 CPT(R)] Follow-up Instructions Return in about 6 months (around 1/31/2018). To-Do List   
 07/31/2017 Imaging:  DEXA BONE DENSITY STUDY AXIAL Patient Instructions Well Visit, Women 48 to 72: Care Instructions Your Care Instructions Physical exams can help you stay healthy. Your doctor has checked your overall health and may have suggested ways to take good care of yourself. He or she also may have recommended tests. At home, you can help prevent illness with healthy eating, regular exercise, and other steps. Follow-up care is a key part of your treatment and safety.  Be sure to make and go to all appointments, and call your doctor if you are having problems. It's also a good idea to know your test results and keep a list of the medicines you take. How can you care for yourself at home? · Reach and stay at a healthy weight. This will lower your risk for many problems, such as obesity, diabetes, heart disease, and high blood pressure. · Get at least 30 minutes of exercise on most days of the week. Walking is a good choice. You also may want to do other activities, such as running, swimming, cycling, or playing tennis or team sports. · Do not smoke. Smoking can make health problems worse. If you need help quitting, talk to your doctor about stop-smoking programs and medicines. These can increase your chances of quitting for good. · Protect your skin from too much sun. When you're outdoors from 10 a.m. to 4 p.m., stay in the shade or cover up with clothing and a hat with a wide brim. Wear sunglasses that block UV rays. Even when it's cloudy, put broad-spectrum sunscreen (SPF 30 or higher) on any exposed skin. · See a dentist one or two times a year for checkups and to have your teeth cleaned. · Wear a seat belt in the car. · Limit alcohol to 1 drink a day. Too much alcohol can cause health problems. Follow your doctor's advice about when to have certain tests. These tests can spot problems early. · Cholesterol. Your doctor will tell you how often to have this done based on your age, family history, or other things that can increase your risk for heart attack and stroke. · Blood pressure. Have your blood pressure checked during a routine doctor visit. Your doctor will tell you how often to check your blood pressure based on your age, your blood pressure results, and other factors. · Mammogram. Ask your doctor how often you should have a mammogram, which is an X-ray of your breasts. A mammogram can spot breast cancer before it can be felt and when it is easiest to treat. · Pap test and pelvic exam. Ask your doctor how often you should have a Pap test. You may not need to have a Pap test as often as you used to. · Vision. Have your eyes checked every year or two or as often as your doctor suggests. Some experts recommend that you have yearly exams for glaucoma and other age-related eye problems starting at age 48. · Hearing. Tell your doctor if you notice any change in your hearing. You can have tests to find out how well you hear. · Diabetes. Ask your doctor whether you should have tests for diabetes. · Colon cancer. You should begin tests for colon cancer at age 48. You may have one of several tests. Your doctor will tell you how often to have tests based on your age and risk. Risks include whether you already had a precancerous polyp removed from your colon or whether your parents, sisters and brothers, or children have had colon cancer. · Thyroid disease. Talk to your doctor about whether to have your thyroid checked as part of a regular physical exam. Women have an increased chance of a thyroid problem. · Osteoporosis. You should begin tests for bone density at age 72. If you are younger than 72, ask your doctor whether you have factors that may increase your risk for this disease. You may want to have this test before age 72. · Heart attack and stroke risk. At least every 4 to 6 years, you should have your risk for heart attack and stroke assessed. Your doctor uses factors such as your age, blood pressure, cholesterol, and whether you smoke or have diabetes to show what your risk for a heart attack or stroke is over the next 10 years. When should you call for help? Watch closely for changes in your health, and be sure to contact your doctor if you have any problems or symptoms that concern you. Where can you learn more? Go to http://ankur-sam.info/.  
Enter E738 in the search box to learn more about \"Well Visit, Women 48 to 65: Care Instructions. \" Current as of: July 19, 2016 Content Version: 11.3 © 9723-5408 [employer]Down. Care instructions adapted under license by [employer] (which disclaims liability or warranty for this information). If you have questions about a medical condition or this instruction, always ask your healthcare professional. [address]  any warranty or liability for your use of this information. Introducing [facility] & HEALTH SERVICES! [facility] introduces [employer] patient portal. Now you can access parts of your medical record, email your doctor's office, and request medication refills online. 1. In your internet browser, go to [email or web address] 2. Click on the First Time User? Click Here link in the Sign In box. You will see the New Member Sign Up page. 3. Enter your [employer] Access Code exactly as it appears below. You will not need to use this code after youve completed the sign-up process. If you do not sign up before the expiration date, you must request a new code. · [employer] Access Code: [email or web address] Expires: 10/29/2017 11:33 AM 
 
4. Enter the last four digits of your Social Security Number (xxxx) and Date of Birth (mm/dd/yyyy) as indicated and click Submit. You will be taken to the next sign-up page. 5. Create a [employer] ID. This will be your [employer] login ID and cannot be changed, so think of one that is secure and easy to remember. 6. Create a [employer] password. You can change your password at any time. 7. Enter your Password Reset Question and Answer. This can be used at a later time if you forget your password. 8. Enter your e-mail address. You will receive e-mail notification when new information is available in [address] 9. Click Sign Up. You can now view and download portions of your medical record. 10. Click the Download Summary menu link to download a portable copy of your medical information. If you have questions, please visit the Frequently Asked Questions section of the [employer] website. Remember, [employer] is NOT to be used for urgent needs. For medical emergencies, dial 911. Now available from your iPhone and Android! Please provide this summary of care documentation to your next provider. If you have any questions after today's visit, please call [phone].

## 2017-07-31 NOTE — PATIENT INSTRUCTIONS
TAKE PREDNISONE 15 mg DAILY    Please call the Patient Care Scheduling Team [phone] to schedule your DXA.

## 2017-07-31 NOTE — PATIENT INSTRUCTIONS
Well Visit, Women 48 to 72: Care Instructions  Your Care Instructions  Physical exams can help you stay healthy. Your doctor has checked your overall health and may have suggested ways to take good care of yourself. He or she also may have recommended tests. At home, you can help prevent illness with healthy eating, regular exercise, and other steps. Follow-up care is a key part of your treatment and safety. Be sure to make and go to all appointments, and call your doctor if you are having problems. It's also a good idea to know your test results and keep a list of the medicines you take. How can you care for yourself at home? · Reach and stay at a healthy weight. This will lower your risk for many problems, such as obesity, diabetes, heart disease, and high blood pressure. · Get at least 30 minutes of exercise on most days of the week. Walking is a good choice. You also may want to do other activities, such as running, swimming, cycling, or playing tennis or team sports. · Do not smoke. Smoking can make health problems worse. If you need help quitting, talk to your doctor about stop-smoking programs and medicines. These can increase your chances of quitting for good. · Protect your skin from too much sun. When you're outdoors from 10 a.m. to 4 p.m., stay in the shade or cover up with clothing and a hat with a wide brim. Wear sunglasses that block UV rays. Even when it's cloudy, put broad-spectrum sunscreen (SPF 30 or higher) on any exposed skin. · See a dentist one or two times a year for checkups and to have your teeth cleaned. · Wear a seat belt in the car. · Limit alcohol to 1 drink a day. Too much alcohol can cause health problems. Follow your doctor's advice about when to have certain tests. These tests can spot problems early. · Cholesterol.  Your doctor will tell you how often to have this done based on your age, family history, or other things that can increase your risk for heart attack and stroke. · Blood pressure. Have your blood pressure checked during a routine doctor visit. Your doctor will tell you how often to check your blood pressure based on your age, your blood pressure results, and other factors. · Mammogram. Ask your doctor how often you should have a mammogram, which is an X-ray of your breasts. A mammogram can spot breast cancer before it can be felt and when it is easiest to treat. · Pap test and pelvic exam. Ask your doctor how often you should have a Pap test. You may not need to have a Pap test as often as you used to. · Vision. Have your eyes checked every year or two or as often as your doctor suggests. Some experts recommend that you have yearly exams for glaucoma and other age-related eye problems starting at age 48. · Hearing. Tell your doctor if you notice any change in your hearing. You can have tests to find out how well you hear. · Diabetes. Ask your doctor whether you should have tests for diabetes. · Colon cancer. You should begin tests for colon cancer at age 48. You may have one of several tests. Your doctor will tell you how often to have tests based on your age and risk. Risks include whether you already had a precancerous polyp removed from your colon or whether your parents, sisters and brothers, or children have had colon cancer. · Thyroid disease. Talk to your doctor about whether to have your thyroid checked as part of a regular physical exam. Women have an increased chance of a thyroid problem. · Osteoporosis. You should begin tests for bone density at age 72. If you are younger than 72, ask your doctor whether you have factors that may increase your risk for this disease. You may want to have this test before age 72. · Heart attack and stroke risk. At least every 4 to 6 years, you should have your risk for heart attack and stroke assessed.  Your doctor uses factors such as your age, blood pressure, cholesterol, and whether you smoke or have diabetes to show what your risk for a heart attack or stroke is over the next 10 years. When should you call for help? Watch closely for changes in your health, and be sure to contact your doctor if you have any problems or symptoms that concern you. Where can you learn more? Go to http://ankur-sam.info/. Enter L710 in the search box to learn more about \"Well Visit, Women 50 to 72: Care Instructions. \"  Current as of: July 19, 2016  Content Version: 11.3  © 4305-4145 Healthwise, Incorporated. Care instructions adapted under license by "[employer] (which disclaims liability or warranty for this information). If you have questions about a medical condition or this instruction, always ask your healthcare professional. [address] any warranty or liability for your use of this information.

## 2017-07-31 NOTE — MR AVS SNAPSHOT
Visit Information Date & Time Provider Department Dept. Phone Encounter #  
 7/31/2017  1:00 PM Juan Manuel Carlin, [address] [place] [id number] Follow-up Instructions Return in about 2 weeks (around 8/14/2017). Upcoming Health Maintenance Date Due Hepatitis C Screening 1950 DTaP/Tdap/Td series (1 - Tdap) 10/2/1973 ZOSTER VACCINE AGE 60> 8/2/2012 INFLUENZA AGE 9 TO ADULT 8/1/2017 BREAST CANCER SCRN MAMMOGRAM 7/31/2018 PAP AKA CERVICAL CYTOLOGY 7/31/2020 COLONOSCOPY 7/31/2027 Allergies as of 7/31/2017  Review Complete On: 7/31/2017 By: Dannielle Hennessy MD  
 No Known Allergies Current Immunizations  Never Reviewed No immunizations on file. Not reviewed this visit You Were Diagnosed With   
  
 Codes Comments Seropositive rheumatoid arthritis of multiple sites [facility])    -  Primary ICD-10-CM: M05.79 ICD-9-CM: 714.0 Long-term use of immunosuppressant medication     ICD-10-CM: Z79.899 ICD-9-CM: V58.69 Long term (current) use of systemic steroids     ICD-10-CM: Z79.52 
ICD-9-CM: V58.65 Disorder of bone     ICD-10-CM: M89.9 ICD-9-CM: 733.90 Vitals BP Pulse Temp Resp Height(growth percentile) Weight(growth percentile) 122/76 (BP 1 Location: Left arm, BP Patient Position: Sitting) 87 98.6 °F (37 °C) 18 5' 6\" (1.676 m) 302 lb (137 kg) BMI OB Status Smoking Status 48.74 kg/m2 Postmenopausal Never Smoker BMI and BSA Data Body Mass Index Body Surface Area 48.74 kg/m 2 2.53 m 2 Preferred Pharmacy Pharmacy Name Phone [address] [facility] [address] [facility] [address] [phone] Your Updated Medication List  
  
   
This list is accurate as of: 7/31/17  1:51 PM.  Always use your most recent med list.  
  
  
  
  
 BENADRYL ALLERGY 25 mg tablet Generic drug:  diphenhydrAMINE Take 25 mg by mouth every six (6) hours as needed. bisacodyl 5 mg Tab Take  by mouth as needed. famotidine 20 mg tablet Commonly known as:  PEPCID  
two (2) times a day. folic acid 1 mg tablet Commonly known as:  Google Take  by mouth daily. furosemide 20 mg tablet Commonly known as:  LASIX Take 1 Tab by mouth as needed. IRON (FERROUS SULFATE) PO Take 65 mg by mouth four (4) times daily. methotrexate 2.5 mg tablet Commonly known as:  Jerrilyn Amado Take 12.5 mg by mouth every Wednesday. metoprolol tartrate 50 mg tablet Commonly known as:  LOPRESSOR  
two (2) times a day. multivitamin tablet Commonly known as:  ONE A DAY Take 1 Tab by mouth daily. predniSONE 10 mg tablet Commonly known as:  Griffith Aver Take 15 mg by mouth daily. traMADol 50 mg tablet Commonly known as:  ULTRAM  
50 mg every six (6) hours as needed. XARELTO 20 mg Tab tablet Generic drug:  rivaroxaban Take 1 Tab by mouth daily (with breakfast). We Performed the Following C REACTIVE PROTEIN, QT [75839 CPT(R)] CHRONIC HEPATITIS PANEL [[id number] Custom] [address], IGG Q7556842 CPT(R)] PROT+CREATU (RANDOM) Q8801121 CPT(R)] PROTEIN ELECTROPHORESIS W/ REFLX BELGICA [[id number] Custom] QUANTIFERON TB GOLD [[id number] Custom] RHEUMATOID FACTOR, QL Q0658251 CPT(R)] SED RATE (ESR) W1849380 CPT(R)] UA/M W/RFLX CULTURE, ROUTINE [[id number] Custom] URIC ACID T3413917 CPT(R)] VITAMIN D, 25 HYDROXY O8604391 CPT(R)] Follow-up Instructions Return in about 2 weeks (around 8/14/2017). To-Do List   
 07/31/2017 Imaging:  XR ELBOW LT MIN 3 V   
  
 07/31/2017 Imaging:  XR FOOT LT MIN 3 V   
  
 07/31/2017 Imaging:  XR FOOT RT MIN 3 V   
  
 07/31/2017 Imaging:  XR HAND LT MIN 3 V   
  
 07/31/2017 Imaging:  XR HAND RT MIN 3 V Patient Instructions TAKE PREDNISONE 15 mg DAILY Please call the Patient Care Scheduling Team [phone] to schedule your DXA. Introducing [facility] & HEALTH SERVICES! [employer] introduces [employer] patient portal. Now you can access parts of your medical record, email your doctor's office, and request medication refills online. 1. In your internet browser, go to [email or web address] 2. Click on the First Time User? Click Here link in the Sign In box. You will see the New Member Sign Up page. 3. Enter your [employer] Access Code exactly as it appears below. You will not need to use this code after youve completed the sign-up process. If you do not sign up before the expiration date, you must request a new code. · [employer] Access Code: [email or web address] Expires: 10/29/2017 11:33 AM 
 
4. Enter the last four digits of your Social Security Number (xxxx) and Date of Birth (mm/dd/yyyy) as indicated and click Submit. You will be taken to the next sign-up page. 5. Create a [employer] ID. This will be your [employer] login ID and cannot be changed, so think of one that is secure and easy to remember. 6. Create a [employer] password. You can change your password at any time. 7. Enter your Password Reset Question and Answer. This can be used at a later time if you forget your password. 8. Enter your e-mail address. You will receive e-mail notification when new information is available in [address] 9. Click Sign Up. You can now view and download portions of your medical record. 10. Click the Download Summary menu link to download a portable copy of your medical information. If you have questions, please visit the Frequently Asked Questions section of the [employer] website. Remember, [employer] is NOT to be used for urgent needs. For medical emergencies, dial 911. Now available from your iPhone and Android! Please provide this summary of care documentation to your next provider. Your primary care clinician is listed as Daniel Donohue. If you have any questions after today's visit, please call [phone].

## 2017-08-01 ENCOUNTER — TELEPHONE (OUTPATIENT)
Dept: INTERNAL MEDICINE CLINIC | Age: 67
End: 2017-08-01

## 2017-08-01 LAB
ALBUMIN SERPL-MCNC: 3.7 G/DL (ref 3.6–4.8)
ALBUMIN/GLOB SERPL: 1.2 {RATIO} (ref 1.2–2.2)
ALP SERPL-CCNC: 64 IU/L (ref 39–117)
ALT SERPL-CCNC: 8 IU/L (ref 0–32)
APPEARANCE UR: CLEAR
AST SERPL-CCNC: 7 IU/L (ref 0–40)
BACTERIA #/AREA URNS HPF: NORMAL /[HPF]
BASOPHILS # BLD AUTO: 0 X10E3/UL (ref 0–0.2)
BASOPHILS NFR BLD AUTO: 0 %
BILIRUB SERPL-MCNC: 0.3 MG/DL (ref 0–1.2)
BILIRUB UR QL STRIP: NEGATIVE
BUN SERPL-MCNC: 10 MG/DL (ref 8–27)
BUN/CREAT SERPL: 11 (ref 12–28)
CALCIUM SERPL-MCNC: 8.9 MG/DL (ref 8.7–10.3)
CASTS URNS QL MICRO: NORMAL /LPF
CHLORIDE SERPL-SCNC: 102 MMOL/L (ref 96–106)
CHOLEST SERPL-MCNC: 207 MG/DL (ref 100–199)
CO2 SERPL-SCNC: 24 MMOL/L (ref 18–29)
COLOR UR: YELLOW
CREAT SERPL-MCNC: 0.9 MG/DL (ref 0.57–1)
CREAT UR-MCNC: 109 MG/DL
EOSINOPHIL # BLD AUTO: 0.1 X10E3/UL (ref 0–0.4)
EOSINOPHIL NFR BLD AUTO: 1 %
EPI CELLS #/AREA URNS HPF: NORMAL /HPF
ERYTHROCYTE [DISTWIDTH] IN BLOOD BY AUTOMATED COUNT: 19.4 % (ref 12.3–15.4)
EST. AVERAGE GLUCOSE BLD GHB EST-MCNC: 103 MG/DL
GLOBULIN SER CALC-MCNC: 3.1 G/DL (ref 1.5–4.5)
GLUCOSE SERPL-MCNC: 91 MG/DL (ref 65–99)
GLUCOSE UR QL: NEGATIVE
HBA1C MFR BLD: 5.2 % (ref 4.8–5.6)
HCT VFR BLD AUTO: 30.8 % (ref 34–46.6)
HCV AB S/CO SERPL IA: <0.1 S/CO RATIO (ref 0–0.9)
HDLC SERPL-MCNC: 78 MG/DL
HGB BLD-MCNC: 10.1 G/DL (ref 11.1–15.9)
HGB UR QL STRIP: NEGATIVE
IMM GRANULOCYTES # BLD: 0 X10E3/UL (ref 0–0.1)
IMM GRANULOCYTES NFR BLD: 0 %
INTERPRETATION, 910389: NORMAL
KETONES UR QL STRIP: NEGATIVE
LDLC SERPL CALC-MCNC: 114 MG/DL (ref 0–99)
LEUKOCYTE ESTERASE UR QL STRIP: NEGATIVE
LYMPHOCYTES # BLD AUTO: 1.2 X10E3/UL (ref 0.7–3.1)
LYMPHOCYTES NFR BLD AUTO: 20 %
MCH RBC QN AUTO: 30.5 PG (ref 26.6–33)
MCHC RBC AUTO-ENTMCNC: 32.8 G/DL (ref 31.5–35.7)
MCV RBC AUTO: 93 FL (ref 79–97)
MICRO URNS: NORMAL
MICRO URNS: NORMAL
MONOCYTES # BLD AUTO: 0.4 X10E3/UL (ref 0.1–0.9)
MONOCYTES NFR BLD AUTO: 7 %
MUCOUS THREADS URNS QL MICRO: PRESENT
NEUTROPHILS # BLD AUTO: 4.4 X10E3/UL (ref 1.4–7)
NEUTROPHILS NFR BLD AUTO: 72 %
NITRITE UR QL STRIP: NEGATIVE
PH UR STRIP: 6 [PH] (ref 5–7.5)
PLATELET # BLD AUTO: 268 X10E3/UL (ref 150–379)
POTASSIUM SERPL-SCNC: 3.6 MMOL/L (ref 3.5–5.2)
PROT SERPL-MCNC: 6.8 G/DL (ref 6–8.5)
PROT UR QL STRIP: NEGATIVE
PROT UR-MCNC: 13.2 MG/DL
PROT/CREAT UR: 121 MG/G CREAT (ref 0–200)
RBC # BLD AUTO: 3.31 X10E6/UL (ref 3.77–5.28)
RBC #/AREA URNS HPF: NORMAL /HPF
SODIUM SERPL-SCNC: 141 MMOL/L (ref 134–144)
SP GR UR: 1.01 (ref 1–1.03)
T4 FREE SERPL-MCNC: 1.38 NG/DL (ref 0.82–1.77)
TRIGL SERPL-MCNC: 76 MG/DL (ref 0–149)
TSH SERPL DL<=0.005 MIU/L-ACNC: 0.66 UIU/ML (ref 0.45–4.5)
URINALYSIS REFLEX, 377202: NORMAL
UROBILINOGEN UR STRIP-MCNC: 0.2 MG/DL (ref 0.2–1)
VLDLC SERPL CALC-MCNC: 15 MG/DL (ref 5–40)
WBC # BLD AUTO: 6.2 X10E3/UL (ref 3.4–10.8)
WBC #/AREA URNS HPF: NORMAL /HPF

## 2017-08-01 NOTE — PROGRESS NOTES
Please call her to let her know as well as mail the letter. 1. Kidney, liver, thyroid, UA level is within normal range. 2. Cholesterol level is  mildly elevated. No need to start any medication. Continue watching your diet, eat healthy, less burris and fatty food, more baked or grilled or broiled food. Exercise 150 minutes/week will be helpful as well. Will recheck level in one year. 3. No diabetes. 4. Anemia noted in blood work. Need to start taking OTC iron supplement 325 mg BID. F/u in 6 months. 5. Hep C is negative.

## 2017-08-01 NOTE — TELEPHONE ENCOUNTER
----- Message from Hermelinda Lemos MD sent at 8/1/2017 10:05 AM EDT -----  Please call her to let her know as well as mail the letter. 1. Kidney, liver, thyroid, UA level is within normal range. 2. Cholesterol level is  mildly elevated. No need to start any medication. Continue watching your diet, eat healthy, less burris and fatty food, more baked or grilled or broiled food. Exercise 150 minutes/week will be helpful as well. Will recheck level in one year. 3. No diabetes. 4. Anemia noted in blood work. Need to start taking OTC iron supplement 325 mg BID. F/u in 6 months. 5. Hep C is negative.

## 2017-08-02 DIAGNOSIS — M79.89 LEG SWELLING: ICD-10-CM

## 2017-08-02 LAB
25(OH)D3+25(OH)D2 SERPL-MCNC: 24.7 NG/ML (ref 30–100)
ALBUMIN SERPL ELPH-MCNC: 3.5 G/DL (ref 2.9–4.4)
ALBUMIN/GLOB SERPL: 0.9 {RATIO} (ref 0.7–1.7)
ALPHA1 GLOB SERPL ELPH-MCNC: 0.3 G/DL (ref 0–0.4)
ALPHA2 GLOB SERPL ELPH-MCNC: 0.8 G/DL (ref 0.4–1)
B-GLOBULIN SERPL ELPH-MCNC: 1.3 G/DL (ref 0.7–1.3)
CCP IGA+IGG SERPL IA-ACNC: 21 UNITS (ref 0–19)
COMMENT, 144067: NORMAL
CRP SERPL-MCNC: 43.6 MG/L (ref 0–4.9)
ERYTHROCYTE [SEDIMENTATION RATE] IN BLOOD BY WESTERGREN METHOD: 76 MM/HR (ref 0–40)
GAMMA GLOB SERPL ELPH-MCNC: 1.5 G/DL (ref 0.4–1.8)
GLOBULIN SER CALC-MCNC: 3.8 G/DL (ref 2.2–3.9)
HBV CORE AB SERPL QL IA: NEGATIVE
HBV CORE IGM SERPL QL IA: NEGATIVE
HBV E AB SERPL QL IA: NEGATIVE
HBV E AG SERPL QL IA: NEGATIVE
HBV SURFACE AB SER QL: NON REACTIVE
HBV SURFACE AG SERPL QL IA: NEGATIVE
HCV AB S/CO SERPL IA: <0.1 S/CO RATIO (ref 0–0.9)
M PROTEIN SERPL ELPH-MCNC: NORMAL G/DL
PLEASE NOTE, 011150: NORMAL
PROT PATTERN SERPL ELPH-IMP: NORMAL
PROT SERPL-MCNC: 7.3 G/DL (ref 6–8.5)
RHEUMATOID FACT SERPL-ACNC: 94.8 IU/ML (ref 0–13.9)
URATE SERPL-MCNC: 6.3 MG/DL (ref 2.5–7.1)

## 2017-08-02 RX ORDER — FUROSEMIDE 20 MG/1
20 TABLET ORAL
Qty: 90 TAB | Refills: 1 | Status: SHIPPED | OUTPATIENT
Start: 2017-08-02 | End: 2018-09-17

## 2017-08-03 LAB
ANNOTATION COMMENT IMP: ABNORMAL
GAMMA INTERFERON BACKGROUND BLD IA-ACNC: 0.04 IU/ML
M TB IFN-G BLD-IMP: ABNORMAL
M TB IFN-G CD4+ BCKGRND COR BLD-ACNC: 0.01 IU/ML
M TB IFN-G CD4+ T-CELLS BLD-ACNC: 0.05 IU/ML
MITOGEN IGNF BLD-ACNC: 0.23 IU/ML
QUANTIFERON INCUBATION: NORMAL
SERVICE CMNT-IMP: ABNORMAL

## 2017-08-03 NOTE — PROGRESS NOTES
The results were reviewed and a letter was sent. Indeterminate TB test (not positive and not negative). Seropositive (anti-CCP, rheumatoid factor) Rheumatoid Arthritis. Elevated inflammatory makers (ESR, CRP). Low vitamin D. All remaining labs are normal/negative.

## 2017-08-14 ENCOUNTER — OFFICE VISIT (OUTPATIENT)
Dept: RHEUMATOLOGY | Age: 67
End: 2017-08-14

## 2017-08-14 VITALS
TEMPERATURE: 98.7 F | SYSTOLIC BLOOD PRESSURE: 139 MMHG | DIASTOLIC BLOOD PRESSURE: 83 MMHG | HEIGHT: 66 IN | OXYGEN SATURATION: 97 % | WEIGHT: 293 LBS | RESPIRATION RATE: 22 BRPM | HEART RATE: 87 BPM | BODY MASS INDEX: 47.09 KG/M2

## 2017-08-14 DIAGNOSIS — Z79.52 LONG TERM (CURRENT) USE OF SYSTEMIC STEROIDS: ICD-10-CM

## 2017-08-14 DIAGNOSIS — N18.2 CKD (CHRONIC KIDNEY DISEASE) STAGE 2, GFR 60-89 ML/MIN: ICD-10-CM

## 2017-08-14 DIAGNOSIS — M79.7 FIBROMYALGIA: ICD-10-CM

## 2017-08-14 DIAGNOSIS — E66.01 MORBID OBESITY WITH BMI OF 45.0-49.9, ADULT (HCC): ICD-10-CM

## 2017-08-14 DIAGNOSIS — Z79.60 LONG-TERM USE OF IMMUNOSUPPRESSANT MEDICATION: ICD-10-CM

## 2017-08-14 DIAGNOSIS — M05.79 SEROPOSITIVE RHEUMATOID ARTHRITIS OF MULTIPLE SITES (HCC): Primary | ICD-10-CM

## 2017-08-14 DIAGNOSIS — E55.9 VITAMIN D DEFICIENCY: ICD-10-CM

## 2017-08-14 PROBLEM — M06.9 RHEUMATOID ARTHRITIS (HCC): Status: RESOLVED | Noted: 2017-03-31 | Resolved: 2017-08-14

## 2017-08-14 RX ORDER — ERGOCALCIFEROL 1.25 MG/1
50000 CAPSULE ORAL
Qty: 12 CAP | Refills: 3 | Status: SHIPPED | OUTPATIENT
Start: 2017-08-16 | End: 2018-08-16

## 2017-08-14 NOTE — MR AVS SNAPSHOT
Visit Information Date & Time Provider Department Dept. Phone Encounter #  
 8/14/2017  2:00 PM Edda Gray, [address] [facility] [address] [place] [id number] Follow-up Instructions Return in about 2 months (around 10/14/2017). Upcoming Health Maintenance Date Due DTaP/Tdap/Td series (1 - Tdap) 10/2/1973 ZOSTER VACCINE AGE 60> 8/2/2012 INFLUENZA AGE 9 TO ADULT 8/1/2017 BREAST CANCER SCRN MAMMOGRAM 7/31/2018 PAP AKA CERVICAL CYTOLOGY 7/31/2020 COLONOSCOPY 7/31/2027 Allergies as of 8/14/2017  Review Complete On: 8/14/2017 By: Edda Gray MD  
 No Known Allergies Current Immunizations  Reviewed on 8/14/2017 Name Date  
 TB Skin Test (PPD) Intradermal  Incomplete Reviewed by Edda Gray MD on 8/14/2017 at  2:08 PM  
You Were Diagnosed With   
  
 Codes Comments Seropositive rheumatoid arthritis of multiple sites [facility])    -  Primary ICD-10-CM: M05.79 ICD-9-CM: 714.0 Long-term use of immunosuppressant medication     ICD-10-CM: Z79.899 ICD-9-CM: V58.69 Long term (current) use of systemic steroids     ICD-10-CM: Z79.52 
ICD-9-CM: V58.65 Morbid obesity with BMI of 45.0-49.9, adult (HCC)     ICD-10-CM: E66.01, Z68.42 
ICD-9-CM: 278.01, V85.42 Vitals BP Pulse Temp Resp Height(growth percentile) Weight(growth percentile) 139/83 (BP 1 Location: Left arm, BP Patient Position: Sitting) 87 98.7 °F (37.1 °C) (Oral) 22 5' 6\" (1.676 m) 302 lb (137 kg) SpO2 BMI OB Status Smoking Status 97% 48.74 kg/m2 Postmenopausal Never Smoker BMI and BSA Data Body Mass Index Body Surface Area 48.74 kg/m 2 2.53 m 2 Preferred Pharmacy Pharmacy Name Phone Cristy Galindo [address] [facility] [phone] Your Updated Medication List  
  
   
This list is accurate as of: 8/14/17  2:21 PM.  Always use your most recent med list.  
  
  
  
 BENADRYL ALLERGY 25 mg tablet Generic drug:  diphenhydrAMINE Take 25 mg by mouth every six (6) hours as needed. bisacodyl 5 mg Tab Take  by mouth as needed. famotidine 20 mg tablet Commonly known as:  PEPCID  
two (2) times a day. folic acid 1 mg tablet Commonly known as:  Google Take  by mouth daily. furosemide 20 mg tablet Commonly known as:  LASIX Take 1 Tab by mouth daily as needed. IRON (FERROUS SULFATE) PO Take 65 mg by mouth four (4) times daily. methotrexate 2.5 mg tablet Commonly known as:  Friendship Plaster Take 12.5 mg by mouth every Wednesday. metoprolol tartrate 50 mg tablet Commonly known as:  LOPRESSOR  
two (2) times a day. multivitamin tablet Commonly known as:  ONE A DAY Take 1 Tab by mouth daily. predniSONE 10 mg tablet Commonly known as:  Ancel Clock Take 10 mg by mouth daily. * tofacitinib 11 mg Tb24 Commonly known as:  XELJANZ XR Take 11 mg by mouth daily for 30 days. * tofacitinib 11 mg Tb24 Commonly known as:  XELJANZ XR Take 11 mg by mouth daily for 30 days. traMADol 50 mg tablet Commonly known as:  ULTRAM  
50 mg every six (6) hours as needed. varicella zoster vacine live 19,400 unit/0.65 mL Susr injection Commonly known as:  varicella-zoster vacine live 1 Vial by SubCUTAneous route once for 1 dose. XARELTO 20 mg Tab tablet Generic drug:  rivaroxaban Take 1 Tab by mouth daily (with breakfast). * Notice: This list has 2 medication(s) that are the same as other medications prescribed for you. Read the directions carefully, and ask your doctor or other care provider to review them with you. Prescriptions Printed Refills  
 varicella zoster vacine live (VARICELLA-ZOSTER VACINE LIVE) 19,400 unit/0.65 mL susr injection 0 Si Vial by SubCUTAneous route once for 1 dose. Class: Print Route: SubCUTAneous Prescriptions Sent to Pharmacy Refills  
 tofacitinib (XELJANZ XR) 11 mg Tb24 11 Sig: Take 11 mg by mouth daily for 30 days. Class: Normal  
 Pharmacy: [facility], [address] [place] [address] #: [phone] Route: Oral  
  
We Performed the Following AMB POC TUBERCULOSIS, INTRADERMAL (SKIN TEST) [50980 CPT(R)] Follow-up Instructions Return in about 2 months (around 10/14/2017). To-Do List   
 08/28/2017 10:30 AM  
  Appointment with [facility] 1 at [address] [place] [address] ([phone]) Please, no calcium supplements or antacids that coat the stomach (ex: Tums, Mylanta) 24 hours prior to procedure. Maintain normal diet and medications. Dairy products are allowed. Wear an outfit with an elastic waistband (no zipper or metal snaps). Check in at registration 15min before your appointment time unless you were instructed to do otherwise. Patient Instructions Please get your Zoster Vaccine BEFORE starting Franchesca Cooley by two weeks Okay to get Zoster vaccine while on methotrexate Will give you a PPD skin test today. Please have it read at your PCP's office in 48 hours - Wednesday, 8/16. Decrease your prednisone to 10 mg daily Introducing [facility]! [facility] introduces [employer] patient portal. Now you can access parts of your medical record, email your doctor's office, and request medication refills online. 1. In your internet browser, go to [email or web address] My [employer] Can [employer] My Car 2. Click on the First Time User? Click Here link in the Sign In box. You will see the New Member Sign Up page. 3. Enter your Hey[employer] Access Code exactly as it appears below. You will not need to use this code after youve completed the sign-up process. If you do not sign up before the expiration date, you must request a new code. · [employer] Access Code: [email or web address] Expires: 10/29/2017 11:33 AM 
 
 4. Enter the last four digits of your Social Security Number (xxxx) and Date of Birth (mm/dd/yyyy) as indicated and click Submit. You will be taken to the next sign-up page. 5. Create a HiWi[employer] ID. This will be your [employer] login ID and cannot be changed, so think of one that is secure and easy to remember. 6. Create a Hi[employer] password. You can change your password at any time. 7. Enter your Password Reset Question and Answer. This can be used at a later time if you forget your password. 8. Enter your e-mail address. You will receive e-mail notification when new information is available in [address] 9. Click Sign Up. You can now view and download portions of your medical record. 10. Click the Download Summary menu link to download a portable copy of your medical information. If you have questions, please visit the Frequently Asked Questions section of the [employer] website. Remember, [employer] is NOT to be used for urgent needs. For medical emergencies, dial 911. Now available from your iPhone and Android! Please provide this summary of care documentation to your next provider. Your primary care clinician is listed as Daniel Donohue. If you have any questions after today's visit, please call [phone].

## 2017-08-14 NOTE — PROGRESS NOTES
Patient is following up from visit on 07/31/17. Visit Vitals    /83 (BP 1 Location: Left arm, BP Patient Position: Sitting)    Pulse 87    Temp 98.7 °F (37.1 °C) (Oral)    Resp 22    Ht 5' 6\" (1.676 m)    Wt 302 lb (137 kg)    SpO2 97%    BMI 48.74 kg/m2     1. Have you been to the ER, urgent care clinic since your last visit? Hospitalized since your last visit? No    2. Have you seen or consulted any other health care providers outside of the [address] [place] [address] since your last visit? Include any pap smears or colon screening. Dr Ban Shelton Hematoligist

## 2017-08-14 NOTE — PATIENT INSTRUCTIONS
Please get your Zoster Vaccine BEFORE starting Luellen Cordia by two weeks    Okay to get Zoster vaccine while on methotrexate     Will give you a PPD skin test today. Please have it read at your PCP's office in 48 hours - Wednesday, 8/16.     Decrease your prednisone to 10 mg daily

## 2017-08-14 NOTE — PROGRESS NOTES
REASON FOR VISIT    This is a follow-up visit for Ms. Wilner Foster for Seropositive Rheumatoid Arthritis. Inflammatory arthritis phenotype includes:  Anti-CCP positive: yes (21)  Rheumatoid factor positive: yes (94.8)  Erosive disease: yes  Extra-articular manifestations include: none    Immunosuppression Screening (7/31/2017): Quantiferon TB: indeterminate  PPD: negative (2016)  PPD:  Not performed  Hepatitis B: negative  Hepatitis C: negative    Therapy History includes:  Current DMARD therapy include: methotrexate 12.5 mg every Wednesday  Prior DMARD therapy include: gold (one year)  Discontinued DMARDs because of inefficacy: gold  Discontinued DMARDs because of side effects: None    Immunizations:   Immunization History   Administered Date(s) Administered    TB Skin Test (PPD) Intradermal 08/14/2017       Active problems include:    Patient Active Problem List   Diagnosis Code    Chronic pulmonary embolism ([facility] I27.82    Essential hypertension I10    DNR (do not resuscitate) Z66    Leg swelling M79.89    Seropositive rheumatoid arthritis of multiple sites ([facility] M05.79    Long-term use of immunosuppressant medication Z79.899    Long term (current) use of systemic steroids Z79.52    Morbid obesity with BMI of 45.0-49.9, adult ([facility] E66.01, Z68.42    Fibromyalgia M79.7    Vitamin D deficiency E55.9    CKD (chronic kidney disease) stage 2, GFR 60-89 ml/min N18.2       HISTORY OF PRESENT ILLNESS    Ms. Wilner Foster returns for a follow-up. On her last visit, I decreased her prednisone to 15 mg daily and continued methotrexate 12.5 mg every Wednesday. She continues to have joint pain, swelling and stiffness. Ms. Wilner Foster has continued her medications for arthritis and reports good tolerance without significant side effects. Last toxicity monitoring by blood work was done on 7/31/2017 and did not reveal any significant adverse effects, except Hct 30.8%, creatinine 0.90 mg/dL, eGFR 78.  Vitamin D 24.7.    Most recent inflammatory markers from 7/31/2017 revealed a ESR 76 mm/hr (previously N/A mm/hr) and CRP 43.6 mg/L (previously N/A mg/L). The patient has not had any interval hospital admissions, infections, or surgeries. REVIEW OF SYSTEMS    A comprehensive review of systems was performed and pertinent results are documented in the HPI, review of systems is otherwise non-contributory. PAST MEDICAL HISTORY    She has a past medical history of Anemia; Fibromyalgia; Hypertension; Osteoarthritis; Osteoporosis; Pulmonary embolism ([facility] and Rheumatoid arthritis ([facility] FAMILY HISTORY    Her family history includes Arthritis-rheumatoid in her mother; Cancer in her father; Diabetes in her maternal grandmother and son; Heart Attack in her paternal grandmother; Schizophrenia in her son. SOCIAL HISTORY    She reports that she has never smoked. She has never used smokeless tobacco. She reports that she does not drink alcohol or use illicit drugs. MEDICATIONS    Current Outpatient Prescriptions   Medication Sig Dispense Refill    tofacitinib (XELJANZ XR) 11 mg Tb24 Take 11 mg by mouth daily for 30 days. 30 Tab 11    tofacitinib (XELJANZ XR) 11 mg Tb24 Take 11 mg by mouth daily for 30 days. 30 Tab 0    varicella zoster vacine live (VARICELLA-ZOSTER VACINE LIVE) 19,400 unit/0.65 mL susr injection 1 Vial by SubCUTAneous route once for 1 dose. 0.65 mL 0    [START ON 8/16/2017] ergocalciferol (ERGOCALCIFEROL) 50,000 unit capsule Take 1 Cap by mouth every Wednesday. 12 Cap 3    furosemide (LASIX) 20 mg tablet Take 1 Tab by mouth daily as needed. 90 Tab 1    IRON, FERROUS SULFATE, PO Take 65 mg by mouth four (4) times daily.  XARELTO 20 mg tab tablet Take 1 Tab by mouth daily (with breakfast). 90 Tab 0    folic acid (FOLVITE) 1 mg tablet Take  by mouth daily.  diphenhydrAMINE (BENADRYL ALLERGY) 25 mg tablet Take 25 mg by mouth every six (6) hours as needed.       multivitamin (ONE A DAY) tablet Take 1 Tab by mouth daily.  bisacodyl 5 mg tab Take  by mouth as needed.  metoprolol tartrate (LOPRESSOR) 50 mg tablet two (2) times a day.  predniSONE (DELTASONE) 10 mg tablet Take 10 mg by mouth daily.  famotidine (PEPCID) 20 mg tablet two (2) times a day.  traMADol (ULTRAM) 50 mg tablet 50 mg every six (6) hours as needed. 0    methotrexate (RHEUMATREX) 2.5 mg tablet Take 12.5 mg by mouth every Wednesday. ALLERGIES    No Known Allergies    PHYSICAL EXAMINATION    Visit Vitals    /83 (BP 1 Location: Left arm, BP Patient Position: Sitting)    Pulse 87    Temp 98.7 °F (37.1 °C) (Oral)    Resp 22    Ht 5' 6\" (1.676 m)    Wt 302 lb (137 kg)    SpO2 97%    BMI 48.74 kg/m2     Body mass index is 48.74 kg/(m^2). General: Patient is alert, oriented x 3, not in acute distress, daughter at bedside    HEENT:   Sclerae are not injected and appear moist.  There is no alopecia. Neck is supple     Abdomen:  Obese. Extremities:  Free of clubbing, cyanosis, edema    Neurological exam:  Muscle strength is full in upper and lower extremities     Skin exam:  There are no rashes, no alopecia, no discoid lesions, no active Raynaud's, no livedo reticularis, no periungual erythema. Musculoskeletal exam:  A comprehensive musculoskeletal exam was performed for all joints of each upper and lower extremity and assessed for swelling, tenderness and range of motion.  Positive results are documented as below:    Decreased ROM of wrists without pain or swelling  Left elbow flexion deformity  Decreased ROM of ankles without pain or swelling  Arm myalgia  10/18 tender points.     Z-Deformities: none  [place]ck Deformities: none  Boutonierre's Deformities: none  Ulnar Deviation: none     Joint Count 8/14/2017 7/31/2017   Patient pain (0-100) 0 -   MHAQ 0.125 0.125   Left wrist- Tender 1 -   Left wrist- Swollen 1 -   Right wrist- Tender 1 -   Right wrist- Swollen 1 -   Tender Joint Count (Total) 2 -   Swollen Joint Count (Total) 2 -   Physician Assessment (0-10) 2 -   Patient Assessment (0-10) 0 -   CDAI Total (calculated) 6 -       DATA REVIEW    Laboratory     The following laboratory results were reviewed and discussed with the patient:    Office Visit on 07/31/2017   Component Date Value    CCP Antibodies IgG/IgA 07/31/2017 21*    Hep B surface Ag screen 07/31/2017 Negative     Hepatitis Be Antigen 07/31/2017 Negative     Hep B Core Ab, IgM 07/31/2017 Negative     Hep B Core Ab, total 07/31/2017 Negative     Hepatitis Be Antibody 07/31/2017 Negative     HEP B SURFACE AB, QUAL 07/31/2017 Non Reactive     HCV Ab 07/31/2017 <0.1     C-Reactive Protein, Qt 07/31/2017 43.6*    Sed rate (ESR) 07/31/2017 76*    QuantiFERON Incubation 07/31/2017                      Value:Incubated, specimen forwarded to [facility], [place] for  completion of the assay.       Rheumatoid factor 07/31/2017 94.8*    Protein, total 07/31/2017 7.3     Albumin 07/31/2017 3.5     Alpha-1-globulin 07/31/2017 0.3     ALPHA-2 GLOBULIN 07/31/2017 0.8     Beta globulin 07/31/2017 1.3     Gamma globulin 07/31/2017 1.5     M-spike 07/31/2017 Not Observed     Globulin, total 07/31/2017 3.8     A/G ratio 07/31/2017 0.9     Please note 07/31/2017 Comment     Interpretation (see belo* 07/31/2017 Comment     Specific Gravity 07/31/2017 1.011     pH (UA) 07/31/2017 6.0     Color 07/31/2017 Yellow     Appearance 07/31/2017 Clear     Leukocyte Esterase 07/31/2017 Negative     Protein 07/31/2017 Negative     Glucose 07/31/2017 Negative     Ketone 07/31/2017 Negative     Blood 07/31/2017 Negative     Bilirubin 07/31/2017 Negative     Urobilinogen 07/31/2017 0.2     Nitrites 07/31/2017 Negative     Microscopic Examination 07/31/2017 Comment     Microscopic exam 07/31/2017 See additional order     URINALYSIS REFLEX 07/31/2017 Comment     Uric acid 07/31/2017 6.3     Creatinine, urine 07/31/2017 109.0     Protein total, urine 07/31/2017 13.2     Protein/Creat Ratio 07/31/2017 121     VITAMIN D, 25-HYDROXY 07/31/2017 24.7*    WBC 07/31/2017 0-5     RBC 07/31/2017 0-2     Epithelial cells 07/31/2017 0-10     Casts 07/31/2017 None seen     Mucus 07/31/2017 Present     Bacteria 07/31/2017 None seen     Comment 07/31/2017 Comment     QuantiFERON TB Gold 07/31/2017 *                    Value: Indeterminate  Mitogen (positive control) gave low response.  QUANTIFERON CRITERIA 07/31/2017 Comment     QuantiFERON TB Ag Value 07/31/2017 0.05     QuantiFERON Nil Value 07/31/2017 0.04     QuantiFERON Mitogen Value 07/31/2017 0.23     QFT TB Ag minus Nil Value 07/31/2017 0.01     Interpretation: 07/31/2017 Comment    Office Visit on 07/31/2017   Component Date Value    Hemoglobin A1c 07/31/2017 5.2     Estimated average glucose 07/31/2017 103     WBC 07/31/2017 6.2     RBC 07/31/2017 3.31*    HGB 07/31/2017 10.1*    HCT 07/31/2017 30.8*    MCV 07/31/2017 93     MCH 07/31/2017 30.5     MCHC 07/31/2017 32.8     RDW 07/31/2017 19.4*    PLATELET 41/76/0639 326     NEUTROPHILS 07/31/2017 72     Lymphocytes 07/31/2017 20     MONOCYTES 07/31/2017 7     EOSINOPHILS 07/31/2017 1     BASOPHILS 07/31/2017 0     ABS. NEUTROPHILS 07/31/2017 4.4     Abs Lymphocytes 07/31/2017 1.2     ABS. MONOCYTES 07/31/2017 0.4     ABS. EOSINOPHILS 07/31/2017 0.1     ABS. BASOPHILS 07/31/2017 0.0     IMMATURE GRANULOCYTES 07/31/2017 0     ABS. IMM.  GRANS. 07/31/2017 0.0     Glucose 07/31/2017 91     BUN 07/31/2017 10     Creatinine 07/31/2017 0.90     GFR est non-AA 07/31/2017 68     GFR est AA 07/31/2017 78     BUN/Creatinine ratio 07/31/2017 11*    Sodium 07/31/2017 141     Potassium 07/31/2017 3.6     Chloride 07/31/2017 102     CO2 07/31/2017 24     Calcium 07/31/2017 8.9     Protein, total 07/31/2017 6.8     Albumin 07/31/2017 3.7     GLOBULIN, TOTAL 07/31/2017 3.1     A-G Ratio 07/31/2017 1.2     Bilirubin, total 07/31/2017 0.3     Alk. phosphatase 07/31/2017 64     AST (SGOT) 07/31/2017 7     ALT (SGPT) 07/31/2017 8     Cholesterol, total 07/31/2017 207*    Triglyceride 07/31/2017 76     HDL Cholesterol 07/31/2017 78     VLDL, calculated 07/31/2017 15     LDL, calculated 07/31/2017 114*    TSH 07/31/2017 0.664     T4, Free 07/31/2017 1.38     Hep C Virus Ab 07/31/2017 <0.1     INTERPRETATION 07/31/2017 Note        Imaging    Musculoskeletal Ultrasound    None    Radiographs    Bilateral Hand 7/31/2017: RIGHT: no fracture. There is diffuse osteopenia. Both corticated and non corticated erosive changes are present involving the radiocarpal joint, carpal joints, and metacarpal carpal joints. In addition, there is involvement of the first and second metacarpal phalangeal joints, most pronounced in the first. The lunate and radius appear ankylosed. LEFT:  no fracture. There is diffuse osteopenia. Both corticated and non corticated erosive changes are present involving the radiocarpal joint, carpal joints, and metacarpal carpal joints. The lunate and radius appear ankylosed. Some erosive changes are also present in the third PIP joint. The scaphoid lunate distance is widened. Left Elbow 7/31/2017: marked flattening of the radial head with sclerosis. In erosive changes also present of the proximal ulna. A moderate joint effusion is seen. Use osteopenia is noted. Bilateral Foot 7/31/2017: RIGHT: diffuse osteopenia. Non corticated erosions are present involving the second, fourth, and fifth MTP joints. Associated soft tissue swelling is present. The there may be an ankylosis of the fourth and third metatarsals to the midfoot. Flattening of the second metatarsal head is noted. There is a small Achilles and moderate the plantar spur. No fracture is seen. LEFT: diffuse osteopenia. Erosive changes are present at the second and third metatarsal tarsal joints.  A small spur is noted at the Achilles insertion. No fractures identified. Less forefoot soft tissue swelling is present. No fracture is seen    Chest 1/30/2017: lungs remain clear. No pneumothorax or pleural effusion apparent. Cardiac silhouette remains large. Endotracheal tube and nasogastric tube have been removed. Left central line stable in position with tip projecting over the superior vena cava. Right Hip 4/26/2016: no fracture or dislocation. The right hip joint spaces normal and symmetric with the left side. Enthesophytes are seen along either iliac crest and there is relatively severe bilateral facet hypertrophy at L5-S1. The sacroiliac joints appear grossly normal.     Right Femur 4/26/2016: the patient is status post prior placement of a 3 component right total knee prosthesis. From this examination a full assessment of the prosthesis is limited. Grossly this examination is negative for a loosening of the prosthetic components, heterotopic ossification, or additional complications of the prosthesis. The joint spaces of the right hip are well maintained without significant osteoarthritis. This examination is negative for a fracture or an insufficiency fracture of the proximal femur. This examination examination is negative for focal lytic or blastic lesions of the right femur. CT Imaging    CT abdomen and pelvis without contrast 7/31/2017:there are linear densities at both lung bases suggesting scarring or subsegmental atelectasis. The lung bases are otherwise clear no pleural effusion is seen. The liver spleen and pancreas are unremarkable. No renal stone or mass is seen. No ureteral dilatation or stone is seen. The urinary bladder is unremarkable. A urinary catheter is noted with tip within the urinary bladder. Small calcifications within the uterus are again noted. The uterus and pelvic adnexa are otherwise unremarkable. No dilated bowel or free air or free fluid is seen.  Specifically there is no evidence of retroperitoneal or intra-abdominal hemorrhage. An inferior vena cava filter is noted. MR Imaging    MRI Lumbar Spine with and without contrast 4/26/2016: study is suboptimal secondary to patient's body habitus. T12-L1: Normal for age. L1-L2: Normal for age. L2-L3: There is mild facet joint arthropathy. L3-L4: There is mild disc disease with concentric bulge. Moderate to severe facet arthropathy is present with bony hypertrophy and ligamentous thickening. There is moderate central canal stenosis and lateral recess narrowing. Bilateral foraminal narrowing is present. There is absence of fat within the right neural foramen suggesting involvement of the exiting L3 nerve root. L4-L5: There is mild disc disease with loss of disc height. Moderate to severe facet arthropathy is present with bony hypertrophy and ligamentous thickening. There is moderate central canal stenosis primarily in a transverse direction. Severe lateral recess narrowing is present with moderate bilateral foraminal narrowing. Absence of fat within the right neural foramen suggests involvement of the exiting right L4 nerve root. L5-S1: There is mild disc degeneration with loss of disc height. Moderate to severe facet arthropathy is present with bony hypertrophy and ligamentous thickening. No significant canal stenosis. There is mild lateral recess narrowing. Bilateral neural foraminal narrowing is also present, right greater than left. Absence of fat within the right neural foramen suggests involvement of the exiting right L5 nerve root. Visualized portions of the sacrum are normal. There is no abnormal enhancement. The conus is normal in contour and signal characteristics. Paraspinal soft tissues are unremarkable. DXA     DXA 8/28/2017: scheduled    ASSESSMENT AND PLAN    This is a follow-up visit for Ms. Wilma Kayser. 1) Seropositive Erosive Rheumatoid Arthritis.  She is on methotrexate 12.5 mg every Wednesday and prednisone 15 mg daily, which I decreased from alternating days of prednisone 10 mg to 20 mg for years. She has more synovitis on prednsione 15 mg. I discussed with her about advancing therapy to the biologic (small particle, anti-TNF, anti-CTL4A). We discussed the potential adverse effects, which include infections, and routes of administration (oral versus infusion versus subcutaneous). The patient was informed these medications co-pay are subject to the patient's insurance coverage and we will not know until it has been submitted to the insurance company. She preferred Julia Butt. She has not had bowel perforation or diverticulitis. An order will be submitted today. I gave her a sample of Julia Butt 11mg XR but instructed her NOT to start it until I confirm a negative PPD, which we administered today, and two weeks after she receives the Zostavax, which I gave her an order for. She will have her PPD read at her PCP's office.    2) Long Term Use of Immunosuppressants. The patient remains on immunomodulatory medications (methotrexate) and requires frequent toxicity monitoring by blood work.    3) Long Term Use of Systemic Steroids (Prednisone). She is on prednisone 15 mg daily. I decreased it today to 10 mg daily. DXA scheduled for 8/28/2017.     4) Fibromyalgia. Her history, constellation of symptoms, and examination are consistent with a pain syndrome, possiblity secondary to Rheumatoid Arthritis.    5) Morbid Obesity. Her BMI was 48.74. Weight loss is recommended. 6) Vitamin D Deficiency. Her vitamin D level was 24.7. I prescribed weekly ergocalciferol 50,000.    7) CKD Stage 2. Her creatinine was 0.90 mg/dL, eGFR 78. I will follow closely while on methotrexate. The patient voiced understanding of the aforementioned assessment and plan. Summary of plan was provided in the After Visit Summary patient instructions.      TODAY'S ORDERS    Orders Placed This Encounter    AMB POC TUBERCULOSIS, INTRADERMAL (SKIN TEST)    tofacitinib [facility] XR) 11 mg Tb24    tofacitinib (XELJANZ XR) 11 mg Tb24    varicella zoster vacine live (VARICELLA-ZOSTER VACINE LIVE) 19,400 unit/0.65 mL susr injection    ergocalciferol (ERGOCALCIFEROL) 50,000 unit capsule     Future Appointments  Date Time Provider Constance Abreu   8/28/2017 10:30 AM [facility] DEXA 1 [facility]   10/9/2017 10:20 AM MD Pete Eng MD, [address] [place] [address]    Adult Rheumatology   Musculoskeletal Ultrasound Certified  [address] [place] [address], [address] [facility]   Phone [phone]  Fax [phone]

## 2017-08-15 ENCOUNTER — TELEPHONE (OUTPATIENT)
Dept: RHEUMATOLOGY | Age: 67
End: 2017-08-15

## 2017-08-15 NOTE — TELEPHONE ENCOUNTER
Received call from patients pharmacy asking about pt receiving flu, pneumonia, and varicella vaccines while taking prednisone. Consulted Dr Kelsey Bo who stated that patient could have all vaccines with medications she's currently taking.

## 2017-08-16 LAB
MM INDURATION POC: 0 MM (ref 0–5)
PPD POC: NEGATIVE NEGATIVE

## 2017-08-28 ENCOUNTER — HOSPITAL ENCOUNTER (OUTPATIENT)
Dept: MAMMOGRAPHY | Age: 67
Discharge: HOME | End: 2017-08-28
Attending: FAMILY MEDICINE
Payer: MEDICARE

## 2017-08-28 DIAGNOSIS — Z79.52 ON PREDNISONE THERAPY: ICD-10-CM

## 2017-08-28 DIAGNOSIS — Z78.0 POSTMENOPAUSAL: ICD-10-CM

## 2017-08-28 PROCEDURE — 77080 DXA BONE DENSITY AXIAL: CPT

## 2017-08-29 ENCOUNTER — TELEPHONE (OUTPATIENT)
Dept: RHEUMATOLOGY | Age: 67
End: 2017-08-29

## 2017-09-26 ENCOUNTER — TELEPHONE (OUTPATIENT)
Dept: INTERNAL MEDICINE CLINIC | Age: 67
End: 2017-09-26

## 2017-09-26 DIAGNOSIS — I48.0 PAROXYSMAL ATRIAL FIBRILLATION (HCC): ICD-10-CM

## 2017-09-26 DIAGNOSIS — I10 ESSENTIAL HYPERTENSION: Primary | ICD-10-CM

## 2017-09-26 NOTE — TELEPHONE ENCOUNTER
Dr. Olga Conway, needs a referral for Follow up on I48.0, [address] [facility] [address]    Fax: [phone]

## 2017-10-05 ENCOUNTER — TELEPHONE (OUTPATIENT)
Dept: RHEUMATOLOGY | Age: 67
End: 2017-10-05

## 2017-10-05 NOTE — TELEPHONE ENCOUNTER
Patient informed Pietro Mini XR was denied, and she can try Humira or Enbrel or Remicade. Patient states she has appointment on Monday with Dr. George Zelaya, and will discuss the medications at her visit.

## 2017-10-09 ENCOUNTER — OFFICE VISIT (OUTPATIENT)
Dept: RHEUMATOLOGY | Age: 67
End: 2017-10-09

## 2017-10-09 VITALS
BODY MASS INDEX: 47.09 KG/M2 | RESPIRATION RATE: 20 BRPM | SYSTOLIC BLOOD PRESSURE: 127 MMHG | TEMPERATURE: 98.3 F | HEART RATE: 83 BPM | HEIGHT: 66 IN | WEIGHT: 293 LBS | DIASTOLIC BLOOD PRESSURE: 84 MMHG

## 2017-10-09 DIAGNOSIS — Z79.60 LONG-TERM USE OF IMMUNOSUPPRESSANT MEDICATION: ICD-10-CM

## 2017-10-09 DIAGNOSIS — N18.2 CKD (CHRONIC KIDNEY DISEASE) STAGE 2, GFR 60-89 ML/MIN: ICD-10-CM

## 2017-10-09 DIAGNOSIS — E66.01 MORBID OBESITY WITH BMI OF 45.0-49.9, ADULT (HCC): ICD-10-CM

## 2017-10-09 DIAGNOSIS — E55.9 VITAMIN D DEFICIENCY: ICD-10-CM

## 2017-10-09 DIAGNOSIS — Z79.52 LONG TERM (CURRENT) USE OF SYSTEMIC STEROIDS: ICD-10-CM

## 2017-10-09 DIAGNOSIS — M79.7 FIBROMYALGIA: ICD-10-CM

## 2017-10-09 DIAGNOSIS — M05.79 SEROPOSITIVE RHEUMATOID ARTHRITIS OF MULTIPLE SITES (HCC): Primary | ICD-10-CM

## 2017-10-09 RX ORDER — ETANERCEPT 50 MG/ML
50 SOLUTION SUBCUTANEOUS
Qty: 3.92 ML | Refills: 2 | Status: SHIPPED | OUTPATIENT
Start: 2017-10-09 | End: 2017-10-09 | Stop reason: ALTCHOICE

## 2017-10-09 RX ORDER — PREDNISONE 5 MG/1
TABLET ORAL
Qty: 90 TAB | Refills: 0 | Status: SHIPPED | OUTPATIENT
Start: 2017-10-09 | End: 2018-01-08 | Stop reason: DRUGHIGH

## 2017-10-09 RX ORDER — PREDNISONE 5 MG/1
TABLET ORAL
Qty: 90 TAB | Refills: 0 | Status: SHIPPED | OUTPATIENT
Start: 2017-10-09 | End: 2017-10-09 | Stop reason: SDUPTHER

## 2017-10-09 NOTE — PATIENT INSTRUCTIONS
Please decrease your prednisone to 7.5 mg (1.5 tablets) of 5 mg tablets that I just sent to THE [facility]. Take 1.5 tabs daily for 2 weeks and then 1 tab daily until I see you next. Will start you on Humira    Adalimumab (By injection)   Adalimumab ([id number])  Treats arthritis, plaque psoriasis, ankylosing spondylitis, Crohn disease, ulcerative colitis, hidradenitis suppurativa, and uveitis. Brand Name(s): Humira   There may be other brand names for this medicine. When This Medicine Should Not Be Used: This medicine is not right for everyone. Do not use it if you had an allergic reaction to adalimumab. How to Use This Medicine:   Injectable  · Your doctor will prescribe your exact dose and tell you how often it should be given. This medicine is given as a shot under your skin. · A nurse or other health provider will give you this medicine. · You may be taught how to give your medicine at home. Make sure you understand all instructions before giving yourself an injection. Do not use more medicine or use it more often than your doctor tells you to. · You will be shown the body areas where this shot can be given. Use a different body area each time you give yourself a shot. Keep track of where you give each shot to make sure you rotate body areas. Do not inject into skin areas that are red, bruised, tender, or hard. If you have psoriasis, do not inject into a raised, thick, red, or scaly skin patch or into skin lesions. · This medicine should come with a Medication Guide. Ask your pharmacist for a copy if you do not have one. · Missed dose: Take a dose as soon as you remember. If it is almost time for your next dose, wait until then and take a regular dose. Do not take extra medicine to make up for a missed dose. · If you store this medicine at home, keep it in the refrigerator. Do not freeze. Protect the medicine from light.  Keep your medicine and supplies in the original packages until you are ready to use them. Drugs and Foods to Avoid:   Ask your doctor or pharmacist before using any other medicine, including over-the-counter medicines, vitamins, and herbal products. · Some foods and medicines can affect how adalimumab works. Tell your doctor if you are using any of the following:   ¨ Abatacept, anakinra, azathioprine, cyclosporine, mercaptopurine, rituximab, theophylline  ¨ A blood thinner (including warfarin)  ¨ Medicine that weakens the immune system (including a steroid or cancer medicine)  · This medicine may interfere with vaccines. Ask your doctor before you get a flu shot or any other vaccines. Warnings While Using This Medicine:   · Tell your doctor if you are pregnant or breastfeeding, or if you have liver disease, a history of cancer, COPD, heart failure, diabetes, psoriasis, multiple sclerosis, optic neuritis, problems with your immune system, or a history of Guillain-Barré syndrome. Tell your doctor if you have any type of infection (such as hepatitis B or tuberculosis) or an infection that keeps coming back. · This medicine may cause the following problems:   ¨ Increased risk for infection  ¨ Increased risk of certain cancers, such as lymphoma or leukemia  ¨ New or worsening heart failure  · Tell your doctor if you have a latex allergy. The needle cover of the syringe contains latex and may cause allergic reactions. · You will need to have a skin test for tuberculosis (TB) before you start this medicine. Tell your doctor if you or anyone in your home has ever had a positive TB skin test or been exposed to TB. · This medicine may make you bleed, bruise, or get infections more easily. Take precautions to prevent illness and injury. Wash your hands often. · Your doctor will do lab tests at regular visits to check on the effects of this medicine. Keep all appointments. · Throw away used needles in a hard, closed container that the needles cannot poke through.  Keep this container away from children and pets. · Keep all medicine out of the reach of children. Never share your medicine with anyone. Possible Side Effects While Using This Medicine:   Call your doctor right away if you notice any of these side effects:  · Allergic reaction: Itching or hives, swelling in your face or hands, swelling or tingling in your mouth or throat, chest tightness, trouble breathing  · Blistering, peeling, red skin rash, or red, scaly patches on the skin  · Change in how much or how often you urinate, painful urination  · Changes in vision  · Chest pain, uneven heartbeat, trouble breathing  · Cough, fever, chills, runny or stuffy nose, sore throat, and body aches  · Dark urine or pale stools, nausea, vomiting, loss of appetite, stomach pain, yellow skin or eyes  · Numbness, tingling, or burning pain in your hands, arms, legs, or feet, or joint pain  · Rapid weight gain, swelling in your hands, ankles, lower legs, or feet  · Sores or white patches on your lips, mouth, or throat  · Swollen glands in your neck, underarms, or groin  · Unusual bleeding, bruising, tiredness, weakness, or weight loss  If you notice these less serious side effects, talk with your doctor:   · Back pain  · Headache  · Redness, itching, bruising, bleeding, pain, or swelling where the shot was given  If you notice other side effects that you think are caused by this medicine, tell your doctor. Call your doctor for medical advice about side effects. You may report side effects to FDA at 3-695-LHY-7733  © 2017 [employer] Information is for End User's use only and may not be sold, redistributed or otherwise used for commercial purposes. The above information is an  only. It is not intended as medical advice for individual conditions or treatments. Talk to your doctor, nurse or pharmacist before following any medical regimen to see if it is safe and effective for you.

## 2017-10-09 NOTE — PROGRESS NOTES
REASON FOR VISIT    This is a follow-up visit for Ms. Powell Grew for Seropositive Rheumatoid Arthritis. Inflammatory arthritis phenotype includes:  Anti-CCP positive: yes (21)  Rheumatoid factor positive: yes (94.8)  Erosive disease: yes  Extra-articular manifestations include: none    Immunosuppression Screening (7/31/2017):   Quantiferon TB: indeterminate  PPD: negative (2016)  PPD: negative (8/16/2017)  Hepatitis B: negative  Hepatitis C: negative    Therapy History includes:  Current DMARD therapy include: methotrexate 12.5 mg every Wednesday  Prior DMARD therapy include: gold (one year)  Discontinued DMARDs because of inefficacy: gold  Discontinued DMARDs because of side effects: None    Bone Density Historical Synopsis    Height loss since age 29 (at least two inches): 0  Fracture history includes: no  Family history of hip fracture: no  Fall Risk: no    Daily calcium intake is 0 mg  Weekly vitamin D intake is 50,000 IU    Smoking history: no  Alcohol consumption: no  Prednisone history: yes    Exercise: no    Previous work-up for osteoporosis includes the following:  DEXA Scan: 8/28/2017  Vitamin 25OH D level: 24.7 (7/31/2017)  PTH: None  TSH: 1.38 (7/31/2017)    Therapy History includes:    Current osteoporosis therapy includes: none  Prior osteoporosis therapy includes: none  The following osteoporosis therapy have been ineffective: none  The following osteoporosis therapy were stopped because of side effects: none    Immunizations:   Immunization History   Administered Date(s) Administered    Influenza Vaccine 08/15/2017    Pneumococcal Polysaccharide (PPSV-23) 08/15/2017    TB Skin Test (PPD) Intradermal 08/14/2017    Zoster Vaccine, Live 08/15/2017       Active problems include:    Patient Active Problem List   Diagnosis Code    Chronic pulmonary embolism ([facility] I27.82    Essential hypertension I10    DNR (do not resuscitate) Z66    Leg swelling M79.89    Seropositive rheumatoid arthritis of multiple sites ([facility] M05.79    Long-term use of immunosuppressant medication Z79.899    Long term (current) use of systemic steroids Z79.52    Morbid obesity with BMI of 45.0-49.9, adult (HCC) E66.01, Z68.42    Fibromyalgia M79.7    Vitamin D deficiency E55.9    CKD (chronic kidney disease) stage 2, GFR 60-89 ml/min N18.2       HISTORY OF PRESENT ILLNESS    Ms. Diann Roland returns for a follow-up. On her last visit, I decreased her prednisone to 10 mg daily and continued methotrexate 12.5 mg every Wednesday. I did not increase methotrexate due to her CDK. I also started her on [place]. On 10/05/2017, we received notification that [place] was denied and her insurance would cover Humira or Enbrel or Remicade instead. Today, she denies joint pain, swelling and stiffness. She continues on prednisone 10 mg daily. I reviewed her DXA with her today. Ms. Diann Roland has continued her medications for arthritis and reports good tolerance without significant side effects. Last toxicity monitoring by blood work was done on 7/31/2017 and did not reveal any significant adverse effects, except Hct 30.8%, creatinine 0.90 mg/dL, eGFR 78. Vitamin D 24.7. Most recent inflammatory markers from 7/31/2017 revealed a ESR 76 mm/hr (previously N/A mm/hr) and CRP 43.6 mg/L (previously N/A mg/L). The patient has not had any interval hospital admissions, infections, or surgeries. REVIEW OF SYSTEMS    A comprehensive review of systems was performed and pertinent results are documented in the HPI, review of systems is otherwise non-contributory. PAST MEDICAL HISTORY    She has a past medical history of Anemia; Fibromyalgia; Hypertension; Osteoarthritis; Osteoporosis; Pulmonary embolism ([facility] and Rheumatoid arthritis ([facility] FAMILY HISTORY    Her family history includes Arthritis-rheumatoid in her mother; Cancer in her father; Diabetes in her maternal grandmother and son;  Heart Attack in her paternal grandmother; Schizophrenia in her son. SOCIAL HISTORY    She reports that she has never smoked. She has never used smokeless tobacco. She reports that she does not drink alcohol or use illicit drugs. MEDICATIONS    Current Outpatient Prescriptions   Medication Sig Dispense Refill    adalimumab (HUMIRA PEN) 40 mg/0.8 mL injection pen 0.8 mL by SubCUTAneous route every fourteen (14) days for 90 days. 2 Kit 3    predniSONE (DELTASONE) 5 mg tablet 1.5 tabs daily for 2 weeks then 1 tab daily 90 Tab 0    ergocalciferol (ERGOCALCIFEROL) 50,000 unit capsule Take 1 Cap by mouth every Wednesday. 12 Cap 3    furosemide (LASIX) 20 mg tablet Take 1 Tab by mouth daily as needed. 90 Tab 1    IRON, FERROUS SULFATE, PO Take 65 mg by mouth four (4) times daily.  XARELTO 20 mg tab tablet Take 1 Tab by mouth daily (with breakfast). 90 Tab 0    folic acid (FOLVITE) 1 mg tablet Take  by mouth daily.  diphenhydrAMINE (BENADRYL ALLERGY) 25 mg tablet Take 25 mg by mouth every six (6) hours as needed.  multivitamin (ONE A DAY) tablet Take 1 Tab by mouth daily.  bisacodyl 5 mg tab Take  by mouth as needed.  metoprolol tartrate (LOPRESSOR) 50 mg tablet two (2) times a day.  famotidine (PEPCID) 20 mg tablet two (2) times a day.  traMADol (ULTRAM) 50 mg tablet 50 mg every six (6) hours as needed. 0    methotrexate (RHEUMATREX) 2.5 mg tablet Take 12.5 mg by mouth every Wednesday. ALLERGIES    No Known Allergies    PHYSICAL EXAMINATION    Visit Vitals    /84 (BP 1 Location: Right arm, BP Patient Position: Sitting)    Pulse 83    Temp 98.3 °F (36.8 °C)    Resp 20    Ht 5' 6\" (1.676 m)    Wt 303 lb (137.4 kg)    BMI 48.91 kg/m2     Body mass index is 48.91 kg/(m^2). General: Patient is alert, oriented x 3, not in acute distress, daughter at bedside    HEENT:   Sclerae are not injected and appear moist.  There is no alopecia.  Neck is supple     Cardiovascular:  Heart is regular rate and rhythm, no murmurs. Chest:  Lungs are clear to auscultation bilaterally. Abdomen:  Obese. Extremities:  Free of clubbing, cyanosis, edema    Neurological exam:  Muscle strength is full in upper and lower extremities     Skin exam:  There are no rashes, no alopecia, no discoid lesions, no active Raynaud's, no livedo reticularis, no periungual erythema. Musculoskeletal exam:  A comprehensive musculoskeletal exam was performed for all joints of each upper and lower extremity and assessed for swelling, tenderness and range of motion.  Positive results are documented as below:    Decreased ROM of wrists without pain or swelling  Left elbow flexion deformity  Decreased ROM of ankles with pain or swelling on left   Arm myalgia  10/18 tender points.     Z-Deformities:   none  [place]ck Deformities:  none  Boutonierre's Deformities:  none  Ulnar Deviation:   none     Joint Count 10/9/2017 8/14/2017 7/31/2017   Patient pain (0-100) 0 0 -   MHAQ 0 0.125 0.125   Left wrist- Tender - 1 -   Left wrist- Swollen - 1 -   Right wrist- Tender - 1 -   Right wrist- Swollen - 1 -   Right 2nd MCP - Swollen 1 - -   Right 3rd MCP - Swollen 1 - -   Right 2nd PIP - Swollen 1 - -   Right 3rd PIP - Swollen 1 - -   Tender Joint Count (Total) - 2 -   Swollen Joint Count (Total) 4 2 -   Physician Assessment (0-10) - 2 -   Patient Assessment (0-10) 0 0 -   CDAI Total (calculated) - 6 -       DATA REVIEW    Laboratory     The following laboratory results were reviewed and discussed with the patient:    Office Visit on 08/14/2017   Component Date Value    PPD 08/16/2017 negative     mm Induration 08/16/2017 0    Office Visit on 07/31/2017   Component Date Value    CCP Antibodies IgG/IgA 07/31/2017 21*    Hep B surface Ag screen 07/31/2017 Negative     Hepatitis Be Antigen 07/31/2017 Negative     Hep B Core Ab, IgM 07/31/2017 Negative     Hep B Core Ab, total 07/31/2017 Negative     Hepatitis Be Antibody 07/31/2017 Negative     HEP B SURFACE AB, QUAL 07/31/2017 Non Reactive     HCV Ab 07/31/2017 <0.1     C-Reactive Protein, Qt 07/31/2017 43.6*    Sed rate (ESR) 07/31/2017 76*    QuantiFERON Incubation 07/31/2017                      Value:Incubated, specimen forwarded to [facility], [place] for  completion of the assay.  Rheumatoid factor 07/31/2017 94.8*    Protein, total 07/31/2017 7.3     Albumin 07/31/2017 3.5     Alpha-1-globulin 07/31/2017 0.3     ALPHA-2 GLOBULIN 07/31/2017 0.8     Beta globulin 07/31/2017 1.3     Gamma globulin 07/31/2017 1.5     M-spike 07/31/2017 Not Observed     Globulin, total 07/31/2017 3.8     A/G ratio 07/31/2017 0.9     Please note 07/31/2017 Comment     Interpretation (see belo* 07/31/2017 Comment     Specific Gravity 07/31/2017 1.011     pH (UA) 07/31/2017 6.0     Color 07/31/2017 Yellow     Appearance 07/31/2017 Clear     Leukocyte Esterase 07/31/2017 Negative     Protein 07/31/2017 Negative     Glucose 07/31/2017 Negative     Ketone 07/31/2017 Negative     Blood 07/31/2017 Negative     Bilirubin 07/31/2017 Negative     Urobilinogen 07/31/2017 0.2     Nitrites 07/31/2017 Negative     Microscopic Examination 07/31/2017 Comment     Microscopic exam 07/31/2017 See additional order     URINALYSIS REFLEX 07/31/2017 Comment     Uric acid 07/31/2017 6.3     Creatinine, urine 07/31/2017 109.0     Protein total, urine 07/31/2017 13.2     Protein/Creat Ratio 07/31/2017 121     VITAMIN D, 25-HYDROXY 07/31/2017 24.7*    WBC 07/31/2017 0-5     RBC 07/31/2017 0-2     Epithelial cells 07/31/2017 0-10     Casts 07/31/2017 None seen     Mucus 07/31/2017 Present     Bacteria 07/31/2017 None seen     Comment 07/31/2017 Comment     QuantiFERON TB Gold 07/31/2017 *                    Value: Indeterminate  Mitogen (positive control) gave low response.       QUANTIFERON CRITERIA 07/31/2017 Comment     QuantiFERON TB Ag Value 07/31/2017 0.05     QuantiFERON Nil Value 07/31/2017 0.04     QuantiFERON Mitogen Value 07/31/2017 0.23     QFT TB Ag minus Nil Value 07/31/2017 0.01     Interpretation: 07/31/2017 Comment    Office Visit on 07/31/2017   Component Date Value    Hemoglobin A1c 07/31/2017 5.2     Estimated average glucose 07/31/2017 103     WBC 07/31/2017 6.2     RBC 07/31/2017 3.31*    HGB 07/31/2017 10.1*    HCT 07/31/2017 30.8*    MCV 07/31/2017 93     MCH 07/31/2017 30.5     MCHC 07/31/2017 32.8     RDW 07/31/2017 19.4*    PLATELET 02/55/6462 097     NEUTROPHILS 07/31/2017 72     Lymphocytes 07/31/2017 20     MONOCYTES 07/31/2017 7     EOSINOPHILS 07/31/2017 1     BASOPHILS 07/31/2017 0     ABS. NEUTROPHILS 07/31/2017 4.4     Abs Lymphocytes 07/31/2017 1.2     ABS. MONOCYTES 07/31/2017 0.4     ABS. EOSINOPHILS 07/31/2017 0.1     ABS. BASOPHILS 07/31/2017 0.0     IMMATURE GRANULOCYTES 07/31/2017 0     ABS. IMM. GRANS. 07/31/2017 0.0     Glucose 07/31/2017 91     BUN 07/31/2017 10     Creatinine 07/31/2017 0.90     GFR est non-AA 07/31/2017 68     GFR est AA 07/31/2017 78     BUN/Creatinine ratio 07/31/2017 11*    Sodium 07/31/2017 141     Potassium 07/31/2017 3.6     Chloride 07/31/2017 102     CO2 07/31/2017 24     Calcium 07/31/2017 8.9     Protein, total 07/31/2017 6.8     Albumin 07/31/2017 3.7     GLOBULIN, TOTAL 07/31/2017 3.1     A-G Ratio 07/31/2017 1.2     Bilirubin, total 07/31/2017 0.3     Alk. phosphatase 07/31/2017 64     AST (SGOT) 07/31/2017 7     ALT (SGPT) 07/31/2017 8     Cholesterol, total 07/31/2017 207*    Triglyceride 07/31/2017 76     HDL Cholesterol 07/31/2017 78     VLDL, calculated 07/31/2017 15     LDL, calculated 07/31/2017 114*    TSH 07/31/2017 0.664     T4, Free 07/31/2017 1.38     Hep C Virus Ab 07/31/2017 <0.1     INTERPRETATION 07/31/2017 Note        Imaging    Musculoskeletal Ultrasound    None    Radiographs    Bilateral Hand 7/31/2017: RIGHT: no fracture. There is diffuse osteopenia.  Both corticated and non corticated erosive changes are present involving the radiocarpal joint, carpal joints, and metacarpal carpal joints. In addition, there is involvement of the first and second metacarpal phalangeal joints, most pronounced in the first. The lunate and radius appear ankylosed. LEFT:  no fracture. There is diffuse osteopenia. Both corticated and non corticated erosive changes are present involving the radiocarpal joint, carpal joints, and metacarpal carpal joints. The lunate and radius appear ankylosed. Some erosive changes are also present in the third PIP joint. The scaphoid lunate distance is widened. Left Elbow 7/31/2017: marked flattening of the radial head with sclerosis. In erosive changes also present of the proximal ulna. A moderate joint effusion is seen. Use osteopenia is noted. Bilateral Foot 7/31/2017: RIGHT: diffuse osteopenia. Non corticated erosions are present involving the second, fourth, and fifth MTP joints. Associated soft tissue swelling is present. The there may be an ankylosis of the fourth and third metatarsals to the midfoot. Flattening of the second metatarsal head is noted. There is a small Achilles and moderate the plantar spur. No fracture is seen. LEFT: diffuse osteopenia. Erosive changes are present at the second and third metatarsal tarsal joints. A small spur is noted at the Achilles insertion. No fractures identified. Less forefoot soft tissue swelling is present. No fracture is seen    Chest 1/30/2017: lungs remain clear. No pneumothorax or pleural effusion apparent. Cardiac silhouette remains large. Endotracheal tube and nasogastric tube have been removed. Left central line stable in position with tip projecting over the superior vena cava. Right Hip 4/26/2016: no fracture or dislocation. The right hip joint spaces normal and symmetric with the left side.  Enthesophytes are seen along either iliac crest and there is relatively severe bilateral facet hypertrophy at L5-S1. The sacroiliac joints appear grossly normal.     Right Femur 4/26/2016: the patient is status post prior placement of a 3 component right total knee prosthesis. From this examination a full assessment of the prosthesis is limited. Grossly this examination is negative for a loosening of the prosthetic components, heterotopic ossification, or additional complications of the prosthesis. The joint spaces of the right hip are well maintained without significant osteoarthritis. This examination is negative for a fracture or an insufficiency fracture of the proximal femur. This examination examination is negative for focal lytic or blastic lesions of the right femur. CT Imaging    CT abdomen and pelvis without contrast 7/31/2017:there are linear densities at both lung bases suggesting scarring or subsegmental atelectasis. The lung bases are otherwise clear no pleural effusion is seen. The liver spleen and pancreas are unremarkable. No renal stone or mass is seen. No ureteral dilatation or stone is seen. The urinary bladder is unremarkable. A urinary catheter is noted with tip within the urinary bladder. Small calcifications within the uterus are again noted. The uterus and pelvic adnexa are otherwise unremarkable. No dilated bowel or free air or free fluid is seen. Specifically there is no evidence of retroperitoneal or intra-abdominal hemorrhage. An inferior vena cava filter is noted. MR Imaging    MRI Lumbar Spine with and without contrast 4/26/2016: study is suboptimal secondary to patient's body habitus. T12-L1: Normal for age. L1-L2: Normal for age. L2-L3: There is mild facet joint arthropathy. L3-L4: There is mild disc disease with concentric bulge. Moderate to severe facet arthropathy is present with bony hypertrophy and ligamentous thickening. There is moderate central canal stenosis and lateral recess narrowing. Bilateral foraminal narrowing is present.  There is absence of fat within the right neural foramen suggesting involvement of the exiting L3 nerve root. L4-L5: There is mild disc disease with loss of disc height. Moderate to severe facet arthropathy is present with bony hypertrophy and ligamentous thickening. There is moderate central canal stenosis primarily in a transverse direction. Severe lateral recess narrowing is present with moderate bilateral foraminal narrowing. Absence of fat within the right neural foramen suggests involvement of the exiting right L4 nerve root. L5-S1: There is mild disc degeneration with loss of disc height. Moderate to severe facet arthropathy is present with bony hypertrophy and ligamentous thickening. No significant canal stenosis. There is mild lateral recess narrowing. Bilateral neural foraminal narrowing is also present, right greater than left. Absence of fat within the right neural foramen suggests involvement of the exiting right L5 nerve root. Visualized portions of the sacrum are normal. There is no abnormal enhancement. The conus is normal in contour and signal characteristics. Paraspinal soft tissues are unremarkable. DXA     DXA 8/28/2017: (excluded None) showed lumbar spine L1-L4 T score 1.2 (BMD 1.345 g/cm2), left femoral neck T score -0.9 (0.919 g/cm2), left total hip T score: 0.1 (1.020 g/cm2), right femoral neck T score: -0.4 (0.977 g/cm2), right total hip T score: -0.6 (0.938 g/cm2), and distal one third left radius T score N/A (BMD N/A g/cm2). FRAX score 5.8 % probability in 10 years for major osteoporotic fracture and 0.4 % 10 year probability of hip fracture. ASSESSMENT AND PLAN    This is a follow-up visit for Ms. Owen Jefferson. 1) Seropositive Erosive Rheumatoid Arthritis. She is on methotrexate 12.5 mg every Wednesday and prednisone 10 mg daily, which I decreased from 15 mg daily. She feels well today. I started [place] All[place] but her insurance denied it due preferring an anti-TNF.  Because she is on prednisone, initiation of a biologic is appropriate. She has not history of heart failure. I discussed with her about advancing therapy to the biologic (anti-TNF). We discussed the potential adverse effects, which include infections, and routes of administration (oral versus infusion versus subcutaneous). The patient was informed these medications co-pay are subject to the patient's insurance coverage and we will not know until it has been submitted to the insurance company. She preferred Humira. An order will be submitted today. Labs today. 2) Long Term Use of Immunosuppressants. The patient remains on immunomodulatory medications (methotrexate) and requires frequent toxicity monitoring by blood work. Respective labs were ordered (CBC and CMP).    3) Long Term Use of Systemic Steroids (Prednisone). She is on prednisone 10 mg daily. I decreased it today to 7.5 mg daily for two weeks followed by 5 mg daily. A script was sent. DXA did not show osteopenia.    4) Fibromyalgia. Her history, constellation of symptoms, and examination are consistent with a pain syndrome, possiblity secondary to Rheumatoid Arthritis. This was not an active issue today.      5) Morbid Obesity. Her BMI was 48.97 (previously 48.74). Weight loss is recommended. 6) Vitamin D Deficiency. Her vitamin D level was 24.7. She is on weekly ergocalciferol 50,000.    7) CKD Stage 2. Her creatinine was 0.90 mg/dL, eGFR 78. I will follow closely while on methotrexate and avoid increasing her dose. The patient voiced understanding of the aforementioned assessment and plan. Summary of plan was provided in the After Visit Summary patient instructions.      TODAY'S ORDERS    Orders Placed This Encounter    CBC WITH AUTOMATED DIFF    METABOLIC PANEL, COMPREHENSIVE    C REACTIVE PROTEIN, QT    SED RATE (ESR)    adalimumab (HUMIRA PEN) 40 mg/0.8 mL injection pen    predniSONE (DELTASONE) 5 mg tablet     Future Appointments  Date Time Provider Constance Abreu 1/8/2018 9:30 AM RANCHO Morley MD, [address] [facility] [place]    Adult Rheumatology   Musculoskeletal Ultrasound Certified  [facility] of [address], [place], [address] [place] [address]   Phone [phone]  Fax [phone]

## 2017-10-09 NOTE — MR AVS SNAPSHOT
Visit Information Date & Time Provider Department Dept. Phone Encounter #  
 10/9/2017 10:20 AM Viviana López [facility] [address] [id number] Follow-up Instructions Return in about 3 months (around 1/9/2018). Upcoming Health Maintenance Date Due DTaP/Tdap/Td series (1 - Tdap) 10/2/1973 GLAUCOMA SCREENING Q2Y 10/2/2017 BREAST CANCER SCRN MAMMOGRAM 7/31/2018 MEDICARE YEARLY EXAM 8/1/2018 Pneumococcal 65+ Low/Medium Risk (1 of 2 - PCV13) 8/15/2018 PAP AKA CERVICAL CYTOLOGY 7/31/2020 COLONOSCOPY 7/31/2027 Allergies as of 10/9/2017  Review Complete On: 10/9/2017 By: Viviana López MD  
 No Known Allergies Current Immunizations  Reviewed on 8/14/2017 Name Date Influenza Vaccine 8/15/2017 Pneumococcal Polysaccharide (PPSV-23) 8/15/2017 TB Skin Test (PPD) Intradermal 8/14/2017 Zoster Vaccine, Live 8/15/2017 Not reviewed this visit You Were Diagnosed With   
  
 Codes Comments Seropositive rheumatoid arthritis of multiple sites [facility])    -  Primary ICD-10-CM: M05.79 ICD-9-CM: 714.0 Long-term use of immunosuppressant medication     ICD-10-CM: Z79.899 ICD-9-CM: V58.69 Long term (current) use of systemic steroids     ICD-10-CM: Z79.52 
ICD-9-CM: V58.65   
 CKD (chronic kidney disease) stage 2, GFR 60-89 ml/min     ICD-10-CM: N18.2 ICD-9-CM: 745. 2 Vitals BP Pulse Temp Resp Height(growth percentile) Weight(growth percentile) 127/84 (BP 1 Location: Right arm, BP Patient Position: Sitting) 83 98.3 °F (36.8 °C) 20 5' 6\" (1.676 m) 303 lb (137.4 kg) BMI OB Status Smoking Status 48.91 kg/m2 Postmenopausal Never Smoker BMI and BSA Data Body Mass Index Body Surface Area 48.91 kg/m 2 2.53 m 2 Preferred Pharmacy Pharmacy Name Phone [address] [facility] [address] [facility] [address] [phone] Your Updated Medication List  
  
   
This list is accurate as of: 10/9/17 10:54 AM.  Always use your most recent med list.  
  
  
  
  
 adalimumab 40 mg/0.8 mL injection pen Commonly known as:  HUMIRA PEN  
0.8 mL by SubCUTAneous route every fourteen (14) days for 90 days. BENADRYL ALLERGY 25 mg tablet Generic drug:  diphenhydrAMINE Take 25 mg by mouth every six (6) hours as needed. bisacodyl 5 mg Tab Take  by mouth as needed. ergocalciferol 50,000 unit capsule Commonly known as:  ERGOCALCIFEROL Take 1 Cap by mouth every Wednesday. famotidine 20 mg tablet Commonly known as:  PEPCID  
two (2) times a day. folic acid 1 mg tablet Commonly known as:  Google Take  by mouth daily. furosemide 20 mg tablet Commonly known as:  LASIX Take 1 Tab by mouth daily as needed. IRON (FERROUS SULFATE) PO Take 65 mg by mouth four (4) times daily. methotrexate 2.5 mg tablet Commonly known as:  Skip Aida Take 12.5 mg by mouth every Wednesday. metoprolol tartrate 50 mg tablet Commonly known as:  LOPRESSOR  
two (2) times a day. multivitamin tablet Commonly known as:  ONE A DAY Take 1 Tab by mouth daily. predniSONE 5 mg tablet Commonly known as:  DELTASONE  
1.5 tabs daily for 2 weeks then 1 tab daily  
  
 traMADol 50 mg tablet Commonly known as:  ULTRAM  
50 mg every six (6) hours as needed. XARELTO 20 mg Tab tablet Generic drug:  rivaroxaban Take 1 Tab by mouth daily (with breakfast). Prescriptions Sent to Pharmacy Refills  
 adalimumab (HUMIRA PEN) 40 mg/0.8 mL injection pen 3 Si.8 mL by SubCUTAneous route every fourteen (14) days for 90 days. Class: Normal  
 Pharmacy: [address] #: [phone] Route: SubCUTAneous  
 predniSONE (DELTASONE) 5 mg tablet 0 Si.5 tabs daily for 2 weeks then 1 tab daily  Class: Normal  
 Pharmacy: [address] [place] [address], [facility] [address] [place] [address] #: [phone] We Performed the Following C REACTIVE PROTEIN, QT [69677 CPT(R)] CBC WITH AUTOMATED DIFF [19601 CPT(R)] METABOLIC PANEL, COMPREHENSIVE [75111 CPT(R)] SED RATE (ESR) [id number] CPT(R)] Follow-up Instructions Return in about 3 months (around 1/9/2018). Patient Instructions Please decrease your prednisone to 7.5 mg (1.5 tablets) of 5 mg tablets that I just sent to [facility]. Take 1.5 tabs daily for 2 weeks and then 1 tab daily until I see you next. Will start you on Humira Adalimumab (By injection) Adalimumab ([id number]-[id number]-mab) Treats arthritis, plaque psoriasis, ankylosing spondylitis, Crohn disease, ulcerative colitis, hidradenitis suppurativa, and uveitis. Brand Name(s): Humira There may be other brand names for this medicine. When This Medicine Should Not Be Used: This medicine is not right for everyone. Do not use it if you had an allergic reaction to adalimumab. How to Use This Medicine:  
Injectable · Your doctor will prescribe your exact dose and tell you how often it should be given. This medicine is given as a shot under your skin. · A nurse or other health provider will give you this medicine. · You may be taught how to give your medicine at home. Make sure you understand all instructions before giving yourself an injection. Do not use more medicine or use it more often than your doctor tells you to. · You will be shown the body areas where this shot can be given. Use a different body area each time you give yourself a shot. Keep track of where you give each shot to make sure you rotate body areas. Do not inject into skin areas that are red, bruised, tender, or hard. If you have psoriasis, do not inject into a raised, thick, red, or scaly skin patch or into skin lesions. · This medicine should come with a Medication Guide.  Ask your pharmacist for a copy if you do not have one. · Missed dose: Take a dose as soon as you remember. If it is almost time for your next dose, wait until then and take a regular dose. Do not take extra medicine to make up for a missed dose. · If you store this medicine at home, keep it in the refrigerator. Do not freeze. Protect the medicine from light. Keep your medicine and supplies in the original packages until you are ready to use them. Drugs and Foods to Avoid: Ask your doctor or pharmacist before using any other medicine, including over-the-counter medicines, vitamins, and herbal products. · Some foods and medicines can affect how adalimumab works. Tell your doctor if you are using any of the following: ¨ Abatacept, anakinra, azathioprine, cyclosporine, mercaptopurine, rituximab, theophylline ¨ A blood thinner (including warfarin) ¨ Medicine that weakens the immune system (including a steroid or cancer medicine) · This medicine may interfere with vaccines. Ask your doctor before you get a flu shot or any other vaccines. Warnings While Using This Medicine: · Tell your doctor if you are pregnant or breastfeeding, or if you have liver disease, a history of cancer, COPD, heart failure, diabetes, psoriasis, multiple sclerosis, optic neuritis, problems with your immune system, or a history of Guillain-Barré syndrome. Tell your doctor if you have any type of infection (such as hepatitis B or tuberculosis) or an infection that keeps coming back. · This medicine may cause the following problems:  
¨ Increased risk for infection ¨ Increased risk of certain cancers, such as lymphoma or leukemia ¨ New or worsening heart failure · Tell your doctor if you have a latex allergy. The needle cover of the syringe contains latex and may cause allergic reactions. · You will need to have a skin test for tuberculosis (TB) before you start this medicine.  Tell your doctor if you or anyone in your home has ever had a positive TB skin test or been exposed to TB. · This medicine may make you bleed, bruise, or get infections more easily. Take precautions to prevent illness and injury. Wash your hands often. · Your doctor will do lab tests at regular visits to check on the effects of this medicine. Keep all appointments. · Throw away used needles in a hard, closed container that the needles cannot poke through. Keep this container away from children and pets. · Keep all medicine out of the reach of children. Never share your medicine with anyone. Possible Side Effects While Using This Medicine:  
Call your doctor right away if you notice any of these side effects: · Allergic reaction: Itching or hives, swelling in your face or hands, swelling or tingling in your mouth or throat, chest tightness, trouble breathing · Blistering, peeling, red skin rash, or red, scaly patches on the skin · Change in how much or how often you urinate, painful urination · Changes in vision · Chest pain, uneven heartbeat, trouble breathing · Cough, fever, chills, runny or stuffy nose, sore throat, and body aches · Dark urine or pale stools, nausea, vomiting, loss of appetite, stomach pain, yellow skin or eyes · Numbness, tingling, or burning pain in your hands, arms, legs, or feet, or joint pain · Rapid weight gain, swelling in your hands, ankles, lower legs, or feet · Sores or white patches on your lips, mouth, or throat · Swollen glands in your neck, underarms, or groin · Unusual bleeding, bruising, tiredness, weakness, or weight loss If you notice these less serious side effects, talk with your doctor: · Back pain · Headache · Redness, itching, bruising, bleeding, pain, or swelling where the shot was given If you notice other side effects that you think are caused by this medicine, tell your doctor. Call your doctor for medical advice about side effects. You may report side effects to FDA at 5-208-MZD-6377 © 2017 [address]  Information is for End User's use only and may not be sold, redistributed or otherwise used for commercial purposes. The above information is an  only. It is not intended as medical advice for individual conditions or treatments. Talk to your doctor, nurse or pharmacist before following any medical regimen to see if it is safe and effective for you. Introducing [facility]! Coty Segovia introduces alaTest patient portal. Now you can access parts of your medical record, email your doctor's office, and request medication refills online. 1. In your internet browser, go to [email or web address] 2. Click on the First Time User? Click Here link in the Sign In box. You will see the New Member Sign Up page. 3. Enter your alaTest Access Code exactly as it appears below. You will not need to use this code after youve completed the sign-up process. If you do not sign up before the expiration date, you must request a new code. · alaTest Access Code: [email or web address] Expires: 10/29/2017 11:33 AM 
 
4. Enter the last four digits of your Social Security Number (xxxx) and Date of Birth (mm/dd/yyyy) as indicated and click Submit. You will be taken to the next sign-up page. 5. Create a alaTest ID. This will be your alaTest login ID and cannot be changed, so think of one that is secure and easy to remember. 6. Create a alaTest password. You can change your password at any time. 7. Enter your Password Reset Question and Answer. This can be used at a later time if you forget your password. 8. Enter your e-mail address. You will receive e-mail notification when new information is available in [address] 9. Click Sign Up. You can now view and download portions of your medical record. 10. Click the Download Summary menu link to download a portable copy of your medical information. If you have questions, please visit the Frequently Asked Questions section of the [employer] website. Remember, Rel[employer]r is NOT to be used for urgent needs. For medical emergencies, dial 911. Now available from your iPhone and Android! Please provide this summary of care documentation to your next provider. Your primary care clinician is listed as Daniel Donohue. If you have any questions after today's visit, please call [phone].

## 2017-10-10 LAB
ALBUMIN SERPL-MCNC: 3.8 G/DL (ref 3.6–4.8)
ALBUMIN/GLOB SERPL: 1.2 {RATIO} (ref 1.2–2.2)
ALP SERPL-CCNC: 76 IU/L (ref 39–117)
ALT SERPL-CCNC: 7 IU/L (ref 0–32)
AST SERPL-CCNC: 10 IU/L (ref 0–40)
BASOPHILS # BLD AUTO: 0 X10E3/UL (ref 0–0.2)
BASOPHILS NFR BLD AUTO: 0 %
BILIRUB SERPL-MCNC: 0.2 MG/DL (ref 0–1.2)
BUN SERPL-MCNC: 10 MG/DL (ref 8–27)
BUN/CREAT SERPL: 10 (ref 12–28)
CALCIUM SERPL-MCNC: 9.4 MG/DL (ref 8.7–10.3)
CHLORIDE SERPL-SCNC: 100 MMOL/L (ref 96–106)
CO2 SERPL-SCNC: 21 MMOL/L (ref 18–29)
CREAT SERPL-MCNC: 1.05 MG/DL (ref 0.57–1)
CRP SERPL-MCNC: 26.3 MG/L (ref 0–4.9)
EOSINOPHIL # BLD AUTO: 0 X10E3/UL (ref 0–0.4)
EOSINOPHIL NFR BLD AUTO: 1 %
ERYTHROCYTE [DISTWIDTH] IN BLOOD BY AUTOMATED COUNT: 15.7 % (ref 12.3–15.4)
ERYTHROCYTE [SEDIMENTATION RATE] IN BLOOD BY WESTERGREN METHOD: 93 MM/HR (ref 0–40)
GLOBULIN SER CALC-MCNC: 3.3 G/DL (ref 1.5–4.5)
GLUCOSE SERPL-MCNC: 88 MG/DL (ref 65–99)
HCT VFR BLD AUTO: 33.5 % (ref 34–46.6)
HGB BLD-MCNC: 10.9 G/DL (ref 11.1–15.9)
IMM GRANULOCYTES # BLD: 0 X10E3/UL (ref 0–0.1)
IMM GRANULOCYTES NFR BLD: 0 %
LYMPHOCYTES # BLD AUTO: 0.9 X10E3/UL (ref 0.7–3.1)
LYMPHOCYTES NFR BLD AUTO: 18 %
MCH RBC QN AUTO: 31.1 PG (ref 26.6–33)
MCHC RBC AUTO-ENTMCNC: 32.5 G/DL (ref 31.5–35.7)
MCV RBC AUTO: 96 FL (ref 79–97)
MONOCYTES # BLD AUTO: 0.3 X10E3/UL (ref 0.1–0.9)
MONOCYTES NFR BLD AUTO: 5 %
NEUTROPHILS # BLD AUTO: 3.7 X10E3/UL (ref 1.4–7)
NEUTROPHILS NFR BLD AUTO: 76 %
PLATELET # BLD AUTO: 274 X10E3/UL (ref 150–379)
POTASSIUM SERPL-SCNC: 4.2 MMOL/L (ref 3.5–5.2)
PROT SERPL-MCNC: 7.1 G/DL (ref 6–8.5)
RBC # BLD AUTO: 3.5 X10E6/UL (ref 3.77–5.28)
SODIUM SERPL-SCNC: 139 MMOL/L (ref 134–144)
WBC # BLD AUTO: 4.9 X10E3/UL (ref 3.4–10.8)

## 2017-10-10 NOTE — PROGRESS NOTES
The results were reviewed and a letter was sent. Inflammatory marker (ESR and CRP) still elevated. Mild chronic kidney disease and anemia.

## 2017-10-11 ENCOUNTER — TELEPHONE (OUTPATIENT)
Dept: RHEUMATOLOGY | Age: 67
End: 2017-10-11

## 2017-10-11 NOTE — TELEPHONE ENCOUNTER
Received notification from [employer] that referral was received for med Humira and will be reviewed. [facility] will contact office once completed.

## 2017-10-13 NOTE — TELEPHONE ENCOUNTER
I initiated a prior auth for med Humira to [facility]. Confirmation was received.  Awaiting for a response from insurance company

## 2017-10-31 ENCOUNTER — CLINICAL SUPPORT (OUTPATIENT)
Dept: RHEUMATOLOGY | Age: 67
End: 2017-10-31

## 2017-10-31 VITALS
DIASTOLIC BLOOD PRESSURE: 81 MMHG | RESPIRATION RATE: 18 BRPM | SYSTOLIC BLOOD PRESSURE: 136 MMHG | OXYGEN SATURATION: 98 % | HEART RATE: 69 BPM | TEMPERATURE: 98.3 F

## 2017-10-31 DIAGNOSIS — M05.79 SEROPOSITIVE RHEUMATOID ARTHRITIS OF MULTIPLE SITES (HCC): Primary | ICD-10-CM

## 2017-10-31 NOTE — PROGRESS NOTES
Pt. Came in for Humira injection education, pt came into the office with her [address] [phone] LOT [id number] Exp. Apr 2019 pt understood education and injected herself in the left thigh under the observation of Nurse Reyna Mohamud and Nurse Claudette Jenkins, pt.  Was monitored for 15 minutes before leaving the with office no signs or symptoms of reactions

## 2017-12-18 ENCOUNTER — OFFICE VISIT (OUTPATIENT)
Dept: INTERNAL MEDICINE CLINIC | Age: 67
End: 2017-12-18

## 2017-12-18 VITALS
OXYGEN SATURATION: 99 % | HEART RATE: 63 BPM | RESPIRATION RATE: 18 BRPM | BODY MASS INDEX: 47.09 KG/M2 | TEMPERATURE: 97.1 F | DIASTOLIC BLOOD PRESSURE: 70 MMHG | HEIGHT: 66 IN | SYSTOLIC BLOOD PRESSURE: 132 MMHG | WEIGHT: 293 LBS

## 2017-12-18 DIAGNOSIS — Z02.89 ENCOUNTER FOR COMPLETION OF FORM WITH PATIENT: ICD-10-CM

## 2017-12-18 DIAGNOSIS — M05.79 SEROPOSITIVE RHEUMATOID ARTHRITIS OF MULTIPLE SITES (HCC): ICD-10-CM

## 2017-12-18 DIAGNOSIS — K04.7 DENTAL ABSCESS: ICD-10-CM

## 2017-12-18 DIAGNOSIS — Z09 HOSPITAL DISCHARGE FOLLOW-UP: Primary | ICD-10-CM

## 2017-12-18 DIAGNOSIS — I27.82 OTHER CHRONIC PULMONARY EMBOLISM WITHOUT ACUTE COR PULMONALE (HCC): ICD-10-CM

## 2017-12-19 RX ORDER — CHLORHEXIDINE GLUCONATE 1.2 MG/ML
15 RINSE ORAL 2 TIMES DAILY
Qty: 420 ML | Refills: 0 | Status: SHIPPED | OUTPATIENT
Start: 2017-12-19 | End: 2018-01-02

## 2017-12-19 NOTE — PROGRESS NOTES
Subjective:     Chief Complaint   Patient presents with   [facility] Follow Up     FMLA paperwork        She  is a 74y.o. year old female with h/o RA, HTN  who presents today for follow up from her recent ER visit. She is with her daughter today. Reports that on 12/25/2017 she went to [facility] ER with a c/o left cheek swelling and pain . She was diagnosed with dental abscess and sent home with penicillin V. Reports that the redness and swelling is lot better. She is no longer having any pain. She is also here with a request to fill out FMLA form for her daughter who is usually help her for transportation, coking food when she is sick. Has been following up with Dr. Sharon Carlton for RA. She denies any cough, chest pain, soa. Reviewed hospital records. Pertinent items are noted in HPI. Objective:     Vitals:    12/18/17 1547   BP: 132/70   Pulse: 63   Resp: 18   Temp: 97.1 °F (36.2 °C)   TempSrc: Oral   SpO2: 99%   Weight: 302 lb 12.8 oz (137.3 kg)   Height: 5' 6\" (1.676 m)       Physical Examination: General appearance - alert, well appearing, and in no distress, oriented to person, place, and time and overweight  Mental status - alert, oriented to person, place, and time, normal mood, behavior, speech, dress, motor activity, and thought processes  Mouth - mucous membranes moist, pharynx normal without lesions . There is a broken top incisor. Non tender. Mild redness noted in her left cheek area.    Neck - supple, no significant adenopathy  Chest - clear to auscultation, no wheezes, rales or rhonchi, symmetric air entry  Heart - normal rate, regular rhythm, normal S1, S2, no murmurs, rubs, clicks or gallops    Allergies   Allergen Reactions    Clindamycin Swelling and Other (comments)     fever      Social History     Social History    Marital status:      Spouse name: N/A    Number of children: N/A    Years of education: N/A     Social History Main Topics    Smoking status: Never Smoker    Smokeless tobacco: Never Used    Alcohol use No    Drug use: No    Sexual activity: Not Asked     Other Topics Concern    None     Social History Narrative      Family History   Problem Relation Age of Onset   [facility] Arthritis-rheumatoid Mother     Cancer Father      lung    Diabetes Maternal Grandmother     Heart Attack Paternal Grandmother     Diabetes Son     Schizophrenia Son       Past Surgical History:   Procedure Laterality Date    HX KNEE REPLACEMENT Bilateral     HX TUBAL LIGATION        Past Medical History:   Diagnosis Date    Anemia     Fibromyalgia     Hypertension     Osteoarthritis     Osteoporosis     [facility]     Rheumatoid arthritis ([facility]       Current Outpatient Prescriptions   Medication Sig Dispense Refill    chlorhexidine (PERIDEX) 0.12 % solution 15 mL by Swish and Spit route two (2) times a day for 14 days. 420 mL 0    predniSONE (DELTASONE) 5 mg tablet 1.5 tabs daily for 2 weeks then 1 tab daily 90 Tab 0    IRON, FERROUS SULFATE, PO Take 65 mg by mouth four (4) times daily.  XARELTO 20 mg tab tablet Take 1 Tab by mouth daily (with breakfast). 90 Tab 0    folic acid (FOLVITE) 1 mg tablet Take  by mouth daily.  diphenhydrAMINE (BENADRYL ALLERGY) 25 mg tablet Take 25 mg by mouth every six (6) hours as needed.  multivitamin (ONE A DAY) tablet Take 1 Tab by mouth daily.  bisacodyl 5 mg tab Take  by mouth as needed.  metoprolol tartrate (LOPRESSOR) 50 mg tablet two (2) times a day.  famotidine (PEPCID) 20 mg tablet two (2) times a day.  traMADol (ULTRAM) 50 mg tablet 50 mg every six (6) hours as needed. 0    methotrexate (RHEUMATREX) 2.5 mg tablet Take 12.5 mg by mouth every Wednesday.  adalimumab (HUMIRA PEN) 40 mg/0.8 mL injection pen 0.8 mL by SubCUTAneous route every fourteen (14) days for 90 days.  2 Kit 3    ergocalciferol (ERGOCALCIFEROL) 50,000 unit capsule Take 1 Cap by mouth every Wednesday. 12 Cap 3    furosemide (LASIX) 20 mg tablet Take 1 Tab by mouth daily as needed. 90 Tab 1        Assessment/ Plan:   Diagnoses and all orders for this visit:    1. Hospital discharge follow-up      - doing well. Continue penicilli as prescribed. 2. Dental abscess  -     chlorhexidine (PERIDEX) 0.12 % solution; 15 mL by Swish and Spit route two (2) times a day for 14 days.  -    Strongly advised patient to have a follow up with her dentist.     3. Encounter for completion of form with patient      - form filled out and faxed. 4. Other chronic pulmonary embolism without acute cor pulmonale ([facility]       - continue Xarelto  5. Seropositive rheumatoid arthritis of multiple sites ([facility]       - Continue Humira and follow up with Dr. Sharon Carlton. Medication risks/benefits/costs/interactions/alternatives discussed with patient. Advised patient to call back or return to office if symptoms worsen/change/persist. If patient cannot reach us or should anything more severe/urgent arise he/she should proceed directly to the nearest emergency department. Discussed expected course/resolution/complications of diagnosis in detail with patient. Patient given a written after visit summary which includes her diagnoses, current medications and vitals. Patient expressed understanding with the diagnosis and plan.        Follow-up Disposition: Not on File

## 2018-01-08 ENCOUNTER — TELEPHONE (OUTPATIENT)
Dept: INTERNAL MEDICINE CLINIC | Age: 68
End: 2018-01-08

## 2018-01-08 ENCOUNTER — OFFICE VISIT (OUTPATIENT)
Dept: RHEUMATOLOGY | Age: 68
End: 2018-01-08

## 2018-01-08 ENCOUNTER — TELEPHONE (OUTPATIENT)
Dept: RHEUMATOLOGY | Age: 68
End: 2018-01-08

## 2018-01-08 VITALS
RESPIRATION RATE: 18 BRPM | BODY MASS INDEX: 47.09 KG/M2 | TEMPERATURE: 97.5 F | DIASTOLIC BLOOD PRESSURE: 81 MMHG | WEIGHT: 293 LBS | SYSTOLIC BLOOD PRESSURE: 144 MMHG | HEART RATE: 84 BPM | HEIGHT: 66 IN

## 2018-01-08 DIAGNOSIS — N18.2 CKD (CHRONIC KIDNEY DISEASE) STAGE 2, GFR 60-89 ML/MIN: ICD-10-CM

## 2018-01-08 DIAGNOSIS — Z79.60 LONG-TERM USE OF IMMUNOSUPPRESSANT MEDICATION: ICD-10-CM

## 2018-01-08 DIAGNOSIS — L30.8 OTHER ECZEMA: ICD-10-CM

## 2018-01-08 DIAGNOSIS — G56.01 CARPAL TUNNEL SYNDROME, RIGHT: ICD-10-CM

## 2018-01-08 DIAGNOSIS — M79.7 FIBROMYALGIA: ICD-10-CM

## 2018-01-08 DIAGNOSIS — E66.01 MORBID OBESITY WITH BMI OF 45.0-49.9, ADULT (HCC): ICD-10-CM

## 2018-01-08 DIAGNOSIS — M05.79 SEROPOSITIVE RHEUMATOID ARTHRITIS OF MULTIPLE SITES (HCC): Primary | ICD-10-CM

## 2018-01-08 DIAGNOSIS — E55.9 VITAMIN D DEFICIENCY: ICD-10-CM

## 2018-01-08 DIAGNOSIS — Z79.52 LONG TERM (CURRENT) USE OF SYSTEMIC STEROIDS: ICD-10-CM

## 2018-01-08 RX ORDER — TRAMADOL HYDROCHLORIDE 50 MG/1
50 TABLET ORAL
Refills: 0 | Status: CANCELLED | OUTPATIENT
Start: 2018-01-08

## 2018-01-08 RX ORDER — METHOTREXATE 2.5 MG/1
12.5 TABLET ORAL
Qty: 65 TAB | Refills: 0 | Status: SHIPPED | OUTPATIENT
Start: 2018-01-10 | End: 2018-03-15 | Stop reason: SDUPTHER

## 2018-01-08 RX ORDER — PREDNISONE 5 MG/1
TABLET ORAL
Qty: 15 TAB | Refills: 0 | Status: SHIPPED | OUTPATIENT
Start: 2018-01-08 | End: 2018-01-29 | Stop reason: ALTCHOICE

## 2018-01-08 RX ORDER — FOLIC ACID 1 MG/1
1 TABLET ORAL DAILY
Qty: 90 TAB | Refills: 0 | Status: SHIPPED | OUTPATIENT
Start: 2018-01-08 | End: 2018-04-08

## 2018-01-08 NOTE — TELEPHONE ENCOUNTER
Pt states that she saw her rheumatologist this morning and was told to get refill from PCP. Pt states that she will just take tylenol for as needed for pain.

## 2018-01-08 NOTE — PATIENT INSTRUCTIONS
Lower the prednisone by taking prednisone 5 mg one tablet every other day for the next month, then stop. For the itching, take cool quick showers and apply Eucerin lotion while still damp. If not helping then follow up with your PCP. Wear a carpal tunnel brace on right wrist at bedtime to help with the tingling in your right hand. Daily exercise and arm stretches.

## 2018-01-08 NOTE — TELEPHONE ENCOUNTER
She need to discuss that with her rheumatologist.   She is in different regiment for her joint pain by rheumatologist.

## 2018-01-08 NOTE — PROGRESS NOTES
REASON FOR VISIT    This is a follow-up visit for Ms. Isrrael Shore for Seropositive Rheumatoid Arthritis. Inflammatory arthritis phenotype includes:  Anti-CCP positive: yes (21)  Rheumatoid factor positive: yes (94.8)  Erosive disease: yes  Extra-articular manifestations include: none    Immunosuppression Screening (7/31/2017):   Quantiferon TB: indeterminate  PPD: negative (2016)  PPD: negative (8/16/2017)  Hepatitis B: negative  Hepatitis C: negative    Therapy History includes:  Current DMARD therapy include: methotrexate 12.5 mg every Wednesday, Humira 40 mg every 14 days (10/31/2017)  Prior DMARD therapy include: gold (one year)  Discontinued DMARDs because of inefficacy: gold  Discontinued DMARDs because of side effects: None    Bone Density Historical Synopsis    Height loss since age 29 (at least two inches): 0  Fracture history includes: no  Family history of hip fracture: no  Fall Risk: no    Daily calcium intake is 0 mg  Weekly vitamin D intake is 50,000 IU    Smoking history: no  Alcohol consumption: no  Prednisone history: yes    Exercise: no    Previous work-up for osteoporosis includes the following:  DEXA Scan: 8/28/2017  Vitamin 25OH D level: 24.7 (7/31/2017)  PTH: None  TSH: 1.38 (7/31/2017)    Therapy History includes:    Current osteoporosis therapy includes: none  Prior osteoporosis therapy includes: none  The following osteoporosis therapy have been ineffective: none  The following osteoporosis therapy were stopped because of side effects: none    Immunizations:   Immunization History   Administered Date(s) Administered    Influenza Vaccine 08/15/2017    Pneumococcal Polysaccharide (PPSV-23) 08/15/2017    TB Skin Test (PPD) Intradermal 08/14/2017    Zoster Vaccine, Live 08/15/2017       Active problems include:    Patient Active Problem List   Diagnosis Code    Chronic pulmonary embolism ([facility] I27.82    Essential hypertension I10    DNR (do not resuscitate) Z66    Leg swelling M79.89    Seropositive [facility] sites [facility] M05.79    Long-term use of immunosuppressant medication Z79.899    Long term (current) use of systemic steroids Z79.52    Morbid obesity with BMI of 45.0-49.9, adult (HCC) E66.01, Z68.42    Fibromyalgia M79.7    Vitamin D deficiency E55.9    CKD (chronic kidney disease) stage 2, GFR 60-89 ml/min N18.2       HISTORY OF PRESENT ILLNESS    Ms. Mike Layton returns for a follow-up. On her last visit with Dr. Amy Sauceda, he tapered prednisone from 10 mg daily down to 5 mg daily. He continued methotrexate 12.5 mg every Wednesday. He prescribed Humira 40 mg every 14 days, and she had her first dose on 10/31/17. She has been on Humira for about 2 months. Prior to starting Sheila Necessary had been denied by her insurance preferring an anti-TNF instead. Today, she reports joints are doing \"okay, except for my feet\". She reports stiffness in her fingers x 1 hour in the mornings. No joint swelling. She has some chronic tingling in her right hand. She reports increased fibromyalgia symptoms with pain to touch on right chest and right upper back. She reports a chronic issue with her feet having \"lost their padding\", and she has seen orthopedics for this. No swelling in the feet. She reports itching on her arms near elbows, upper thighs, and mid-back over the last 2 weeks. No rash. She denies personal history of eczema, but her daughter and sister have it. She takes benadryl at bedtime with some relief. She enjoys long hot showers this winter. Ms. Mike Layton has continued her medications for arthritis and reports good tolerance without significant side effects. Last toxicity monitoring by blood work was done on 10/9/2017 and did not reveal any significant adverse effects, except Hct 33.5%, creatinine 1.05 mg/dL, eGFR 56. Vitamin D 24.7.     Most recent inflammatory markers from 10/9/2017 revealed a ESR 93 mm/hr (previously 76 mm/hr) and CRP 26.3 mg/L (previously 43.6 mg/L). Since last visit, she was treated for a dental abscess in December 2017. She did not stop her methotrexate or Humira during this time and reports her infection resolved without residual effects. The patient has not had any interval hospital admissions or surgeries. REVIEW OF SYSTEMS    A comprehensive review of systems was performed and pertinent results are documented in the HPI, review of systems is otherwise non-contributory. PAST MEDICAL HISTORY    She has a past medical history of Anemia; Fibromyalgia; Hypertension; Osteoarthritis; Osteoporosis; Pulmonary embolism ([facility] [address] and Rheumatoid arthritis ([facility] [address] [facility] FAMILY HISTORY    Her family history includes Arthritis-rheumatoid in her mother; Cancer in her father; Diabetes in her maternal grandmother and son; Heart Attack in her paternal grandmother; Schizophrenia in her son. SOCIAL HISTORY    She reports that she has never smoked. She has never used smokeless tobacco. She reports that she does not drink alcohol or use illicit drugs. MEDICATIONS    Current Outpatient Prescriptions   Medication Sig Dispense Refill    adalimumab (HUMIRA) 40 mg/0.8 mL injection pen 40 mg by SubCUTAneous route every fourteen (14) days.  predniSONE (DELTASONE) 5 mg tablet 1.5 tabs daily for 2 weeks then 1 tab daily (Patient taking differently: 5 mg daily.) 90 Tab 0    ergocalciferol (ERGOCALCIFEROL) 50,000 unit capsule Take 1 Cap by mouth every Wednesday. 12 Cap 3    furosemide (LASIX) 20 mg tablet Take 1 Tab by mouth daily as needed. 90 Tab 1    IRON, FERROUS SULFATE, PO Take 65 mg by mouth four (4) times daily.  XARELTO 20 mg tab tablet Take 1 Tab by mouth daily (with breakfast). 90 Tab 0    folic acid (FOLVITE) 1 mg tablet Take  by mouth daily.  diphenhydrAMINE (BENADRYL ALLERGY) 25 mg tablet Take 25 mg by mouth every six (6) hours as needed.  multivitamin (ONE A DAY) tablet Take 1 Tab by mouth daily.       bisacodyl 5 mg tab Take by mouth as needed.  metoprolol tartrate (LOPRESSOR) 50 mg tablet two (2) times a day.  traMADol (ULTRAM) 50 mg tablet 50 mg every six (6) hours as needed. 0    methotrexate (RHEUMATREX) 2.5 mg tablet Take 12.5 mg by mouth every Wednesday.  famotidine (PEPCID) 20 mg tablet two (2) times a day. ALLERGIES    Allergies   Allergen Reactions    Clindamycin Swelling and Other (comments)     fever       PHYSICAL EXAMINATION    Visit Vitals    /81 (BP 1 Location: Left arm, BP Patient Position: Sitting)    Pulse 84    Temp 97.5 °F (36.4 °C)    Resp 18    Ht 5' 6\" (1.676 m)    Wt 296 lb (134.3 kg)    BMI 47.78 kg/m2     Body mass index is 47.78 kg/(m^2). General: Patient is alert, oriented x 3, not in acute distress, daughter at bedside    HEENT:   Sclerae are not injected and appear moist.  There is no alopecia. Neck is supple     Cardiovascular:  Heart is regular rate and rhythm, no murmurs. Chest:  Lungs are clear to auscultation bilaterally. Abdomen:  Obese. Extremities:  Free of clubbing, cyanosis, edema    Neurological exam:  Muscle strength is full in upper and lower extremities     Right wrist Tinel's positive    Skin exam:  There are no rashes, no alopecia, no discoid lesions, no active Raynaud's, no livedo reticularis, no periungual erythema. Skin is dry and is without rash  Signs of excoriation on extensor surfaces of proximal forearms bilaterally  No injection site reaction. Musculoskeletal exam:  A comprehensive musculoskeletal exam was performed for all joints of each upper and lower extremity and assessed for swelling, tenderness and range of motion.  Positive results are documented as below:    Decreased ROM of wrists without pain or swelling  Left elbow flexion deformity  Right 3rd PIP flexion deformity  Decreased ROM of ankles without pain or swelling     1/18 tender points (right mid-upper trapezius).     Z-Deformities:   none  Car[place] Neck Deformities: none  Boutonierre's Deformities:  none  Ulnar Deviation:   none     Joint Count 1/8/2018 10/9/2017 8/14/2017 7/31/2017   Patient pain (0-100) 10 0 0 -   MHAQ 0 0 0.125 0.125   Left wrist- Tender 1 - 1 -   Left wrist- Swollen - - 1 -   Right wrist- Tender - - 1 -   Right wrist- Swollen - - 1 -   Right 2nd MCP - Swollen 1 1 - -   Right 3rd MCP - Swollen - 1 - -   Right 2nd PIP - Swollen 1 1 - -   Right 3rd PIP - Swollen - 1 - -   Tender Joint Count (Total) 1 - 2 -   Swollen Joint Count (Total) 2 4 2 -   Physician Assessment (0-10) 1 - 2 -   Patient Assessment (0-10) 1 0 0 -   CDAI Total (calculated) 5 - 6 -       DATA REVIEW    Laboratory     The following laboratory results were reviewed and discussed with the patient:    Office Visit on 10/09/2017   Component Date Value    WBC 10/09/2017 4.9     RBC 10/09/2017 3.50*    HGB 10/09/2017 10.9*    HCT 10/09/2017 33.5*    MCV 10/09/2017 96     MCH 10/09/2017 31.1     MCHC 10/09/2017 32.5     RDW 10/09/2017 15.7*    PLATELET 20/82/6212 201     NEUTROPHILS 10/09/2017 76     Lymphocytes 10/09/2017 18     MONOCYTES 10/09/2017 5     EOSINOPHILS 10/09/2017 1     BASOPHILS 10/09/2017 0     ABS. NEUTROPHILS 10/09/2017 3.7     Abs Lymphocytes 10/09/2017 0.9     ABS. MONOCYTES 10/09/2017 0.3     ABS. EOSINOPHILS 10/09/2017 0.0     ABS. BASOPHILS 10/09/2017 0.0     IMMATURE GRANULOCYTES 10/09/2017 0     ABS. IMM. GRANS. 10/09/2017 0.0     Glucose 10/09/2017 88     BUN 10/09/2017 10     Creatinine 10/09/2017 1.05*    GFR est non-AA 10/09/2017 56*    GFR est AA 10/09/2017 64     BUN/Creatinine ratio 10/09/2017 10*    Sodium 10/09/2017 139     Potassium 10/09/2017 4.2     Chloride 10/09/2017 100     CO2 10/09/2017 21     Calcium 10/09/2017 9.4     Protein, total 10/09/2017 7.1     Albumin 10/09/2017 3.8     GLOBULIN, TOTAL 10/09/2017 3.3     A-G Ratio 10/09/2017 1.2     Bilirubin, total 10/09/2017 0.2     Alk.  phosphatase 10/09/2017 76     AST (SGOT) 10/09/2017 10     ALT (SGPT) 10/09/2017 7     C-Reactive Protein, Qt 10/09/2017 26.3*    Sed rate (ESR) 10/09/2017 93*       Imaging    Musculoskeletal Ultrasound    None    Radiographs    Bilateral Hand 7/31/2017: RIGHT: no fracture. There is diffuse osteopenia. Both corticated and non corticated erosive changes are present involving the radiocarpal joint, carpal joints, and metacarpal carpal joints. In addition, there is involvement of the first and second metacarpal phalangeal joints, most pronounced in the first. The lunate and radius appear ankylosed. LEFT:  no fracture. There is diffuse osteopenia. Both corticated and non corticated erosive changes are present involving the radiocarpal joint, carpal joints, and metacarpal carpal joints. The lunate and radius appear ankylosed. Some erosive changes are also present in the third PIP joint. The scaphoid lunate distance is widened. Left Elbow 7/31/2017: marked flattening of the radial head with sclerosis. In erosive changes also present of the proximal ulna. A moderate joint effusion is seen. Use osteopenia is noted. Bilateral Foot 7/31/2017: RIGHT: diffuse osteopenia. Non corticated erosions are present involving the second, fourth, and fifth MTP joints. Associated soft tissue swelling is present. The there may be an ankylosis of the fourth and third metatarsals to the midfoot. Flattening of the second metatarsal head is noted. There is a small Achilles and moderate the plantar spur. No fracture is seen. LEFT: diffuse osteopenia. Erosive changes are present at the second and third metatarsal tarsal joints. A small spur is noted at the Achilles insertion. No fractures identified. Less forefoot soft tissue swelling is present. No fracture is seen    Chest 1/30/2017: lungs remain clear. No pneumothorax or pleural effusion apparent. Cardiac silhouette remains large. Endotracheal tube and nasogastric tube have been removed.  Left central line stable in position with tip projecting over the superior vena cava. Right Hip 4/26/2016: no fracture or dislocation. The right hip joint spaces normal and symmetric with the left side. Enthesophytes are seen along either iliac crest and there is relatively severe bilateral facet hypertrophy at L5-S1. The sacroiliac joints appear grossly normal.     Right Femur 4/26/2016: the patient is status post prior placement of a 3 component right total knee prosthesis. From this examination a full assessment of the prosthesis is limited. Grossly this examination is negative for a loosening of the prosthetic components, heterotopic ossification, or additional complications of the prosthesis. The joint spaces of the right hip are well maintained without significant osteoarthritis. This examination is negative for a fracture or an insufficiency fracture of the proximal femur. This examination examination is negative for focal lytic or blastic lesions of the right femur. CT Imaging    CT abdomen and pelvis without contrast 7/31/2017:there are linear densities at both lung bases suggesting scarring or subsegmental atelectasis. The lung bases are otherwise clear no pleural effusion is seen. The liver spleen and pancreas are unremarkable. No renal stone or mass is seen. No ureteral dilatation or stone is seen. The urinary bladder is unremarkable. A urinary catheter is noted with tip within the urinary bladder. Small calcifications within the uterus are again noted. The uterus and pelvic adnexa are otherwise unremarkable. No dilated bowel or free air or free fluid is seen. Specifically there is no evidence of retroperitoneal or intra-abdominal hemorrhage. An inferior vena cava filter is noted. MR Imaging    MRI Lumbar Spine with and without contrast 4/26/2016: study is suboptimal secondary to patient's body habitus. T12-L1: Normal for age. L1-L2: Normal for age. L2-L3: There is mild facet joint arthropathy.  L3-L4: There is mild disc disease with concentric bulge. Moderate to severe facet arthropathy is present with bony hypertrophy and ligamentous thickening. There is moderate central canal stenosis and lateral recess narrowing. Bilateral foraminal narrowing is present. There is absence of fat within the right neural foramen suggesting involvement of the exiting L3 nerve root. L4-L5: There is mild disc disease with loss of disc height. Moderate to severe facet arthropathy is present with bony hypertrophy and ligamentous thickening. There is moderate central canal stenosis primarily in a transverse direction. Severe lateral recess narrowing is present with moderate bilateral foraminal narrowing. Absence of fat within the right neural foramen suggests involvement of the exiting right L4 nerve root. L5-S1: There is mild disc degeneration with loss of disc height. Moderate to severe facet arthropathy is present with bony hypertrophy and ligamentous thickening. No significant canal stenosis. There is mild lateral recess narrowing. Bilateral neural foraminal narrowing is also present, right greater than left. Absence of fat within the right neural foramen suggests involvement of the exiting right L5 nerve root. Visualized portions of the sacrum are normal. There is no abnormal enhancement. The conus is normal in contour and signal characteristics. Paraspinal soft tissues are unremarkable. DXA     DXA 8/28/2017: (excluded None) showed lumbar spine L1-L4 T score 1.2 (BMD 1.345 g/cm2), left femoral neck T score -0.9 (0.919 g/cm2), left total hip T score: 0.1 (1.020 g/cm2), right femoral neck T score: -0.4 (0.977 g/cm2), right total hip T score: -0.6 (0.938 g/cm2), and distal one third left radius T score N/A (BMD N/A g/cm2). FRAX score 5.8 % probability in 10 years for major osteoporotic fracture and 0.4 % 10 year probability of hip fracture. ASSESSMENT AND PLAN    This is a follow-up visit for Ms. Baleskarmen Alas.     1) Seropositive Erosive Rheumatoid Arthritis. She is on methotrexate 12.5 mg every Wednesday and Humira 40 mg every 14 days (10/31/2017). She is on prednisone 5 mg daily, tapered from 10 mg daily after last visit with Dr. Bashir Joel in October 2017. Tasha Platt was denied by her insurance due to preferring an anti-TNF. Methotrexate was not increased due to CKD Stage 2)). She feels well today. Her CDAI was 5 (previously N/A) with 1 tender and 2 swollen joints. I will continue current methotrexate 12.5 mg every Wednesday and Humira 40 mg every 14 days. I will taper her off of prednisone by having her take prednisone 5 mg every other day for the next month and then stop. Labs today. Follow up in 3 months. 2) Long Term Use of Immunosuppressants. The patient remains on immunomodulatory medications (methotrexate, Humira) and requires frequent toxicity monitoring by blood work. Respective labs were ordered (CBC and CMP).    3) Long Term Use of Systemic Steroids (Prednisone). She has been on chronic daily prednisone for years. She tapered from prednisone 10 mg daily to 5 mg daily October 2017. I have asked her to taper off by taking prednisone 5 mg every other day for one month, and then stop. A script was sent. DXA 8/28/2017 did not show osteopenia.    4) Fibromyalgia. Her history, constellation of symptoms, and examination are consistent with a pain syndrome, possiblity secondary to Rheumatoid Arthritis. She has one active trigger point today. I have encouraged daily stretching and exercise.       5) Morbid Obesity. Her BMI was 47.78 (previously 48.97, 48.74). Weight loss is recommended. 6) Vitamin D Deficiency. Her vitamin D level was 24.7. She is on weekly ergocalciferol 50,000.    7) CKD Stage 2. Her creatinine was 1.05 mg/dL, eGFR 64. I will follow closely while on methotrexate and avoid increasing her dose. 8) Eczema. She reports itching without rash for the last 2 weeks on arms near elbows, upper thighs, and mid-back.  Her skin is dry and there is no rash on exam. She has signs of excoriation on proximal forearms. There is no injection site reaction. She has strong family history of eczema. She has enjoyed long hot showers recently, which is known to dry the skin. I have asked her to take cool, quick showers and apply Eucerin lotion while still damp. If her symptoms do not improve, I have asked her to follow up with her PCP. This does not appear to be a reaction to Humira. 9) Right Sided Carpal Tunnel Syndrome. She reports tingling in her right hand and has positive Tinel's on the right. I have asked her to wear carpal tunnel wrist splint at bedtime. She reports she has a splint at home as she has needed to wear it before. There was no right wrist synovitis on exam today. The patient voiced understanding of the aforementioned assessment and plan. Summary of plan was provided in the After Visit Summary patient instructions. TODAY'S ORDERS  Orders Placed This Encounter    CBC WITH AUTOMATED DIFF    METABOLIC PANEL, COMPREHENSIVE    C REACTIVE PROTEIN, QT    SED RATE (ESR)    predniSONE (DELTASONE) 5 mg tablet    folic acid (FOLVITE) 1 mg tablet    methotrexate (RHEUMATREX) 2.5 mg tablet         Future Appointments  Date Time Provider Constance Abreu   1/29/2018 11:00 AM Sanjuana Peng MD [facility]   4/9/2018 2:20 PM Nuno Salmeron MD [facility]       Aggie Chambers    Adult Rheumatology  [facility] [address] [place] [address]   Phone [phone]  Fax [phone]

## 2018-01-08 NOTE — TELEPHONE ENCOUNTER
Spoke with pt who called asking if Dr. Ga Rodrigez can fill her tramadol. I told her that he does not fill that medication and that she will need to go back to the physician who prescribed it for her and ask for a refill. She stated an understanding.

## 2018-01-08 NOTE — MR AVS SNAPSHOT
Visit Information Date & Time Provider Department Dept. Phone Encounter #  
 1/8/2018  9:30 AM Aggie Barry PA-C [address] [place] [address] [id number] Follow-up Instructions Return in about 3 months (around 4/8/2018) for with Dr. Bernarda Vences. Your Appointments 1/29/2018 11:00 AM  
ROUTINE CARE with Trisha Fiore MD  
[facility] [address]) Appt Note: follow up  
 Arden Oates [address] [address]  
[phone]  
  
   
 [address] [address] Upcoming Health Maintenance Date Due DTaP/Tdap/Td series (1 - Tdap) 10/2/1973 GLAUCOMA SCREENING Q2Y 10/2/2017 BREAST CANCER SCRN MAMMOGRAM 7/31/2018 MEDICARE YEARLY EXAM 8/1/2018 Pneumococcal 65+ Low/Medium Risk (1 of 2 - PCV13) 8/15/2018 COLONOSCOPY 7/31/2027 Allergies as of 1/8/2018  Review Complete On: 1/8/2018 By: Josafat Antony RN Severity Noted Reaction Type Reactions Clindamycin  12/18/2017    Swelling, Other (comments) fever Current Immunizations  Reviewed on 8/14/2017 Name Date Influenza Vaccine 8/15/2017 Pneumococcal Polysaccharide (PPSV-23) 8/15/2017 TB Skin Test (PPD) Intradermal 8/14/2017 Zoster Vaccine, Live 8/15/2017 Not reviewed this visit You Were Diagnosed With   
  
 Codes Comments Seropositive rheumatoid arthritis of multiple sites [facility])    -  Primary ICD-10-CM: M05.79 ICD-9-CM: 714.0 Long-term use of immunosuppressant medication     ICD-10-CM: Z79.899 ICD-9-CM: V58.69 Long term (current) use of systemic steroids     ICD-10-CM: Z79.52 
ICD-9-CM: V58.65 Fibromyalgia     ICD-10-CM: M79.7 ICD-9-CM: 729.1 Morbid obesity with BMI of 45.0-49.9, adult (HCC)     ICD-10-CM: E66.01, Z68.42 
ICD-9-CM: 278.01, V85.42 Vitamin D deficiency     ICD-10-CM: E55.9 ICD-9-CM: 268.9 CKD (chronic kidney disease) stage 2, GFR 60-89 ml/min     ICD-10-CM: N18.2 ICD-9-CM: 585.2 Other eczema     ICD-10-CM: L30.8 ICD-9-CM: 692.9 Carpal tunnel syndrome, right     ICD-10-CM: G56.01 
ICD-9-CM: 354.0 Vitals BP Pulse Temp Resp Height(growth percentile) Weight(growth percentile) 144/81 (BP 1 Location: Left arm, BP Patient Position: Sitting) 84 97.5 °F (36.4 °C) 18 5' 6\" (1.676 m) 296 lb (134.3 kg) BMI OB Status Smoking Status 47.78 kg/m2 Postmenopausal Never Smoker BMI and BSA Data Body Mass Index Body Surface Area 47.78 kg/m 2 2.5 m 2 Preferred Pharmacy Pharmacy Name Phone [facility] [address] [place] [address] Mila Orozco [phone] Your Updated Medication List  
  
   
This list is accurate as of: 1/8/18 10:22 AM.  Always use your most recent med list.  
  
  
  
  
 adalimumab 40 mg/0.8 mL injection pen Commonly known as:  HUMIRA 40 mg by SubCUTAneous route every fourteen (14) days. BENADRYL ALLERGY 25 mg tablet Generic drug:  diphenhydrAMINE Take 25 mg by mouth every six (6) hours as needed. bisacodyl 5 mg Tab Take  by mouth as needed. ergocalciferol 50,000 unit capsule Commonly known as:  ERGOCALCIFEROL Take 1 Cap by mouth every Wednesday. famotidine 20 mg tablet Commonly known as:  PEPCID  
two (2) times a day. folic acid 1 mg tablet Commonly known as:  Google Take  by mouth daily. furosemide 20 mg tablet Commonly known as:  LASIX Take 1 Tab by mouth daily as needed. IRON (FERROUS SULFATE) PO Take 65 mg by mouth four (4) times daily. methotrexate 2.5 mg tablet Commonly known as:  Nathen Kind Take 12.5 mg by mouth every Wednesday. metoprolol tartrate 50 mg tablet Commonly known as:  LOPRESSOR  
two (2) times a day. multivitamin tablet Commonly known as:  ONE A DAY Take 1 Tab by mouth daily. predniSONE 5 mg tablet Commonly known as:  Chichi Scaleser Take one tablet po every other day for 4 weeks  
  
 traMADol 50 mg tablet Commonly known as:  ULTRAM  
50 mg every six (6) hours as needed. XARELTO 20 mg Tab tablet Generic drug:  rivaroxaban Take 1 Tab by mouth daily (with breakfast). Prescriptions Sent to Pharmacy Refills  
 predniSONE (DELTASONE) 5 mg tablet 0 Sig: Take one tablet po every other day for 4 weeks Class: Normal  
 Pharmacy: [address], [address] [place] [address] #: [phone] We Performed the Following C REACTIVE PROTEIN, QT [06673 CPT(R)] CBC WITH AUTOMATED DIFF [38063 CPT(R)] METABOLIC PANEL, COMPREHENSIVE [81336 CPT(R)] SED RATE (ESR) [id number] CPT(R)] Follow-up Instructions Return in about 3 months (around 4/8/2018) for with Dr. Augustus Solis. Patient Instructions Lower the prednisone by taking prednisone 5 mg one tablet every other day for the next month, then stop. For the itching, take cool quick showers and apply Eucerin lotion while still damp. If not helping then follow up with your PCP. Wear a carpal tunnel brace on right wrist at bedtime to help with the tingling in your right hand. Daily exercise and arm stretches. Introducing [facility] & HEALTH SERVICES! Carmelita Fothergill introduces [employer] patient portal. Now you can access parts of your medical record, email your doctor's office, and request medication refills online. 1. In your internet browser, go to [email or web address] 2. Click on the First Time User? Click Here link in the Sign In box. You will see the New Member Sign Up page. 3. Enter your [employer] Access Code exactly as it appears below. You will not need to use this code after youve completed the sign-up process. If you do not sign up before the expiration date, you must request a new code. · [employer] Access Code: [email or web address] Expires: 4/8/2018 10:22 AM 
 
 4. Enter the last four digits of your Social Security Number (xxxx) and Date of Birth (mm/dd/yyyy) as indicated and click Submit. You will be taken to the next sign-up page. 5. Create a App.net ID. This will be your App.net login ID and cannot be changed, so think of one that is secure and easy to remember. 6. Create a App.net password. You can change your password at any time. 7. Enter your Password Reset Question and Answer. This can be used at a later time if you forget your password. 8. Enter your e-mail address. You will receive e-mail notification when new information is available in [address] 9. Click Sign Up. You can now view and download portions of your medical record. 10. Click the Download Summary menu link to download a portable copy of your medical information. If you have questions, please visit the Frequently Asked Questions section of the App.net website. Remember, App.net is NOT to be used for urgent needs. For medical emergencies, dial 911. Now available from your iPhone and Android! Please provide this summary of care documentation to your next provider. Your primary care clinician is listed as Daniel Donohue. If you have any questions after today's visit, please call [phone].

## 2018-01-09 LAB
ALBUMIN SERPL-MCNC: 3.9 G/DL (ref 3.6–4.8)
ALBUMIN/GLOB SERPL: 1.1 {RATIO} (ref 1.2–2.2)
ALP SERPL-CCNC: 80 IU/L (ref 39–117)
ALT SERPL-CCNC: 10 IU/L (ref 0–32)
AST SERPL-CCNC: 14 IU/L (ref 0–40)
BASOPHILS # BLD AUTO: 0 X10E3/UL (ref 0–0.2)
BASOPHILS NFR BLD AUTO: 1 %
BILIRUB SERPL-MCNC: 0.3 MG/DL (ref 0–1.2)
BUN SERPL-MCNC: 8 MG/DL (ref 8–27)
BUN/CREAT SERPL: 9 (ref 12–28)
CALCIUM SERPL-MCNC: 9.3 MG/DL (ref 8.7–10.3)
CHLORIDE SERPL-SCNC: 101 MMOL/L (ref 96–106)
CO2 SERPL-SCNC: 22 MMOL/L (ref 18–29)
CREAT SERPL-MCNC: 0.92 MG/DL (ref 0.57–1)
CRP SERPL-MCNC: 19.7 MG/L (ref 0–4.9)
EOSINOPHIL # BLD AUTO: 0.1 X10E3/UL (ref 0–0.4)
EOSINOPHIL NFR BLD AUTO: 2 %
ERYTHROCYTE [DISTWIDTH] IN BLOOD BY AUTOMATED COUNT: 15.2 % (ref 12.3–15.4)
ERYTHROCYTE [SEDIMENTATION RATE] IN BLOOD BY WESTERGREN METHOD: 94 MM/HR (ref 0–40)
GLOBULIN SER CALC-MCNC: 3.6 G/DL (ref 1.5–4.5)
GLUCOSE SERPL-MCNC: 93 MG/DL (ref 65–99)
HCT VFR BLD AUTO: 34.6 % (ref 34–46.6)
HGB BLD-MCNC: 11.6 G/DL (ref 11.1–15.9)
IMM GRANULOCYTES # BLD: 0 X10E3/UL (ref 0–0.1)
IMM GRANULOCYTES NFR BLD: 0 %
LYMPHOCYTES # BLD AUTO: 1.6 X10E3/UL (ref 0.7–3.1)
LYMPHOCYTES NFR BLD AUTO: 32 %
MCH RBC QN AUTO: 32 PG (ref 26.6–33)
MCHC RBC AUTO-ENTMCNC: 33.5 G/DL (ref 31.5–35.7)
MCV RBC AUTO: 95 FL (ref 79–97)
MONOCYTES # BLD AUTO: 0.5 X10E3/UL (ref 0.1–0.9)
MONOCYTES NFR BLD AUTO: 10 %
NEUTROPHILS # BLD AUTO: 2.7 X10E3/UL (ref 1.4–7)
NEUTROPHILS NFR BLD AUTO: 55 %
PLATELET # BLD AUTO: 283 X10E3/UL (ref 150–379)
POTASSIUM SERPL-SCNC: 4.2 MMOL/L (ref 3.5–5.2)
PROT SERPL-MCNC: 7.5 G/DL (ref 6–8.5)
RBC # BLD AUTO: 3.63 X10E6/UL (ref 3.77–5.28)
SODIUM SERPL-SCNC: 141 MMOL/L (ref 134–144)
WBC # BLD AUTO: 4.9 X10E3/UL (ref 3.4–10.8)

## 2018-01-09 NOTE — PROGRESS NOTES
The results were reviewed and a letter was sent. Stable chronic kidney disease stage 2.  Persistently elevated inflammatory markers (CRP, ESR)

## 2018-01-11 ENCOUNTER — TELEPHONE (OUTPATIENT)
Dept: RHEUMATOLOGY | Age: 68
End: 2018-01-11

## 2018-01-11 NOTE — TELEPHONE ENCOUNTER
Pt called stating that she has been on Humira since the end of October and she stated that she broke out in hives on her chest yesterday. She took 2 benadryl but it has not subsided. It is not itchy and she is not putting any creams on it. Does she need to stop the Humira? She has not started taking any other new medications recently. She does not have any Humira left and would need a new prescription if she needs to continue on it.

## 2018-01-12 ENCOUNTER — TELEPHONE (OUTPATIENT)
Dept: RHEUMATOLOGY | Age: 68
End: 2018-01-12

## 2018-01-12 NOTE — TELEPHONE ENCOUNTER
Spoke with pt and informed her that Dr. Zoya Funk would like Aggie to see the pt to be evaluated for a possible reaction to Humira.   An appt was made for Monday at 2pm.

## 2018-01-15 ENCOUNTER — OFFICE VISIT (OUTPATIENT)
Dept: RHEUMATOLOGY | Age: 68
End: 2018-01-15

## 2018-01-15 VITALS
WEIGHT: 293 LBS | DIASTOLIC BLOOD PRESSURE: 83 MMHG | OXYGEN SATURATION: 96 % | HEART RATE: 72 BPM | BODY MASS INDEX: 47.09 KG/M2 | HEIGHT: 66 IN | RESPIRATION RATE: 18 BRPM | TEMPERATURE: 98.2 F | SYSTOLIC BLOOD PRESSURE: 130 MMHG

## 2018-01-15 DIAGNOSIS — E66.01 MORBID OBESITY WITH BMI OF 45.0-49.9, ADULT (HCC): ICD-10-CM

## 2018-01-15 DIAGNOSIS — Z79.52 LONG TERM (CURRENT) USE OF SYSTEMIC STEROIDS: ICD-10-CM

## 2018-01-15 DIAGNOSIS — N18.2 CKD (CHRONIC KIDNEY DISEASE) STAGE 2, GFR 60-89 ML/MIN: ICD-10-CM

## 2018-01-15 DIAGNOSIS — G56.01 RIGHT CARPAL TUNNEL SYNDROME: ICD-10-CM

## 2018-01-15 DIAGNOSIS — Z79.60 LONG-TERM USE OF IMMUNOSUPPRESSANT MEDICATION: ICD-10-CM

## 2018-01-15 DIAGNOSIS — M79.7 FIBROMYALGIA: ICD-10-CM

## 2018-01-15 DIAGNOSIS — M05.79 SEROPOSITIVE RHEUMATOID ARTHRITIS OF MULTIPLE SITES (HCC): Primary | ICD-10-CM

## 2018-01-15 DIAGNOSIS — R21 RASH: ICD-10-CM

## 2018-01-15 DIAGNOSIS — E55.9 VITAMIN D DEFICIENCY: ICD-10-CM

## 2018-01-15 RX ORDER — ETANERCEPT 50 MG/ML
50 SOLUTION SUBCUTANEOUS
Qty: 3.92 ML | Refills: 2 | Status: SHIPPED | OUTPATIENT
Start: 2018-01-15 | End: 2018-04-15

## 2018-01-15 NOTE — MR AVS SNAPSHOT
[address] 
[phone] Patient: Valerie Bhatt MRN: [id number] WSX:[id number] Visit Information Date & Time Provider Department Dept. Phone Encounter #  
 1/15/2018  2:00 PM Aggie Orozco PA-C [address] [place] [address] [facility] [id number] Your Appointments 1/29/2018 11:00 AM  
ROUTINE CARE with Iman Matt MD  
[facility] Internal [facility] Appt Note: follow up  
 Arden Oates [address] [facility] [address]  
[phone]  
  
   
 [facility] [address]  
  
    
 4/9/2018  2:20 PM  
ESTABLISHED PATIENT with Nikki Gutierrez MD  
[address] ([facility]) Appt Note: 3 month f/up  
 [facility] [address]  
[phone]  
  
   
 [facility] [address] Upcoming Health Maintenance Date Due DTaP/Tdap/Td series (1 - Tdap) 10/2/1973 GLAUCOMA SCREENING Q2Y 10/2/2017 BREAST CANCER SCRN MAMMOGRAM 7/31/2018 MEDICARE YEARLY EXAM 8/1/2018 Pneumococcal 65+ Low/Medium Risk (1 of 2 - PCV13) 8/15/2018 COLONOSCOPY 7/31/2027 Allergies as of 1/15/2018  Review Complete On: 1/15/2018 By: Bryan Paul LPN Severity Noted Reaction Type Reactions Clindamycin  12/18/2017    Swelling, Other (comments) fever Current Immunizations  Reviewed on 8/14/2017 Name Date Influenza Vaccine 8/15/2017 Pneumococcal Polysaccharide (PPSV-23) 8/15/2017 TB Skin Test (PPD) Intradermal 8/14/2017 Zoster Vaccine, Live 8/15/2017 Not reviewed this visit You Were Diagnosed With   
  
 Codes Comments Seropositive rheumatoid arthritis of multiple sites [facility])    -  Primary ICD-10-CM: M05.79 ICD-9-CM: 714.0 Rash     ICD-10-CM: R21 
ICD-9-CM: 782.1 Long-term use of immunosuppressant medication     ICD-10-CM: Z79.899 ICD-9-CM: V58.69 Long term (current) use of systemic steroids     ICD-10-CM: Z79.52 
ICD-9-CM: V58.65 Vitals BP Pulse Temp Resp Height(growth percentile) Weight(growth percentile) 130/83 (BP 1 Location: Left arm, BP Patient Position: Sitting) 72 98.2 °F (36.8 °C) (Oral) 18 5' 6\" (1.676 m) 298 lb (135.2 kg) SpO2 BMI OB Status Smoking Status 96% 48.1 kg/m2 Postmenopausal Never Smoker Vitals History BMI and BSA Data Body Mass Index Body Surface Area  
 48.1 kg/m 2 2.51 m 2 Preferred Pharmacy Pharmacy Name Phone Crist[facility] Gal[facility] [address] [phone] Your Updated Medication List  
  
   
This list is accurate as of: 1/15/18  3:18 PM.  Always use your most recent med list.  
  
  
  
  
 BENADRYL ALLERGY 25 mg tablet Generic drug:  diphenhydrAMINE Take 25 mg by mouth every six (6) hours as needed. bisacodyl 5 mg Tab Take  by mouth as needed. ENBREL SURECLICK 50 mg/mL (5.39 mL) injection Generic drug:  etanercept  
0.98 mL by SubCUTAneous route every seven (7) days for 90 days. ergocalciferol 50,000 unit capsule Commonly known as:  ERGOCALCIFEROL Take 1 Cap by mouth every Wednesday. famotidine 20 mg tablet Commonly known as:  PEPCID  
two (2) times a day. folic acid 1 mg tablet Commonly known as:  Google Take 1 Tab by mouth daily for 90 days. furosemide 20 mg tablet Commonly known as:  LASIX Take 1 Tab by mouth daily as needed. IRON (FERROUS SULFATE) PO Take 65 mg by mouth four (4) times daily. methotrexate 2.5 mg tablet Commonly known as:  Jami Ramp Take 5 Tabs by mouth every Wednesday for 90 days. metoprolol tartrate 50 mg tablet Commonly known as:  LOPRESSOR  
two (2) times a day. multivitamin tablet Commonly known as:  ONE A DAY Take 1 Tab by mouth daily. predniSONE 5 mg tablet Commonly known as:  Mala Collazo  
 Take one tablet po every other day for 4 weeks  
  
 traMADol 50 mg tablet Commonly known as:  ULTRAM  
50 mg every six (6) hours as needed. XARELTO 20 mg Tab tablet Generic drug:  rivaroxaban Take 1 Tab by mouth daily (with breakfast). Prescriptions Sent to Pharmacy Refills ENBREL SURECLICK 50 mg/mL (7.57 mL) injection 2 Si.98 mL by SubCUTAneous route every seven (7) days for 90 days. Class: Normal  
 Pharmacy: [address] #: [phone] Route: SubCUTAneous Patient Instructions Stop Humira. Expect a call from [address] [address] regarding Enbrel. If the copay is too high, then ask to start the process for patient assistance. Introducing [facility] & HEALTH SERVICES! [employer] introduces [employer] patient portal. Now you can access parts of your medical record, email your doctor's office, and request medication refills online. 1. In your internet browser, go to [email or web address] 2. Click on the First Time User? Click Here link in the Sign In box. You will see the New Member Sign Up page. 3. Enter your Techn[employer]ati Access Code exactly as it appears below. You will not need to use this code after youve completed the sign-up process. If you do not sign up before the expiration date, you must request a new code. · [employer]ati Access Code: [email or web address] Expires: 2018 10:22 AM 
 
4. Enter the last four digits of your Social Security Number (xxxx) and Date of Birth (mm/dd/yyyy) as indicated and click Submit. You will be taken to the next sign-up page. 5. Create a Technorati ID. This will be your Technorati login ID and cannot be changed, so think of one that is secure and easy to remember. 6. Create a Technorati password. You can change your password at any time. 7. Enter your Password Reset Question and Answer. This can be used at a later time if you forget your password. 8. Enter your e-mail address. You will receive e-mail notification when new information is available in [address] 9. Click Sign Up. You can now view and download portions of your medical record. 10. Click the Download Summary menu link to download a portable copy of your medical information. If you have questions, please visit the Frequently Asked Questions section of the Ail[employer] net website. Remember, A[employer] net is NOT to be used for urgent needs. For medical emergencies, dial 911. Now available from your iPhone and Android! Please provide this summary of care documentation to your next provider. Your primary care clinician is listed as Danieleveline Donohue. If you have any questions after today's visit, please call [phone].

## 2018-01-15 NOTE — PROGRESS NOTES
REASON FOR VISIT    This is an acute visit for Ms. Dorsey November for Seropositive Rheumatoid Arthritis. Inflammatory arthritis phenotype includes:  Anti-CCP positive: yes (21)  Rheumatoid factor positive: yes (94.8)  Erosive disease: yes  Extra-articular manifestations include: none    Immunosuppression Screening (7/31/2017):   Quantiferon TB: indeterminate  PPD: negative (2016)  PPD: negative (8/16/2017)  Hepatitis B: negative  Hepatitis C: negative    Therapy History includes:  Current DMARD therapy include: methotrexate 12.5 mg every Wednesday, Humira 40 mg every 14 days (10/31/2017)  Prior DMARD therapy include: gold (one year)  Discontinued DMARDs because of inefficacy: gold  Discontinued DMARDs because of side effects: None    Bone Density Historical Synopsis    Height loss since age 29 (at least two inches): 0  Fracture history includes: no  Family history of hip fracture: no  Fall Risk: no    Daily calcium intake is 0 mg  Weekly vitamin D intake is 50,000 IU    Smoking history: no  Alcohol consumption: no  Prednisone history: yes    Exercise: no    Previous work-up for osteoporosis includes the following:  DEXA Scan: 8/28/2017  Vitamin 25OH D level: 24.7 (7/31/2017)  PTH: None  TSH: 1.38 (7/31/2017)    Therapy History includes:    Current osteoporosis therapy includes: none  Prior osteoporosis therapy includes: none  The following osteoporosis therapy have been ineffective: none  The following osteoporosis therapy were stopped because of side effects: none    Immunizations:   Immunization History   Administered Date(s) Administered    Influenza Vaccine 08/15/2017    Pneumococcal Polysaccharide (PPSV-23) 08/15/2017    TB Skin Test (PPD) Intradermal 08/14/2017    Zoster Vaccine, Live 08/15/2017       Active problems include:    Patient Active Problem List   Diagnosis Code    Chronic pulmonary embolism ([facility] [address] I27.82    Essential hypertension I10    DNR (do not resuscitate) Z66    Leg swelling M79.89    [facility] M05.79    Long-term use of immunosuppressant medication Z79.899    Long term (current) use of systemic steroids Z79.52    Morbid obesity with BMI of 45.0-49.9, adult (HCC) E66.01, Z68.42    Fibromyalgia M79.7    Vitamin D deficiency E55.9    CKD (chronic kidney disease) stage 2, GFR 60-89 ml/min N18.2       HISTORY OF PRESENT ILLNESS    Ms. Sunita Cantor returns for an acute visit. On her last visit with me, she was doing well with a CDAI of 5 so I continued her methotrexate 12.5 mg every Wednesday and Humira 40 mg every 14 days. For the process of tapering her off of her prednisone, I lowered her from prednisone 5 mg daily to prednisone 5 mg every other day for the next month and then she is to stop it completely. She reported itching for 2 weeks that I felt was eczema. She was taking benadryl at bedtime, and I gave her some eczema treatment guidelines. She has been on Humira since 10/31/2017. Prior to starting Ricoa Oni had been denied by her insurance preferring an anti-TNF instead. Today she states that she did her last Humira injection after she left from her last visit here on Monday 1/8/2018. She reports that on 1/10/2018 she noticed red hives on her chest and abdomen. The rash was slightly itchy, so she is now taking benadryl twice daily and that has calmed it down. The rash is now resolved on her chest, but there are still signs of it on her abdomen. She has not noted any vesicles, pustules, scale, or weeping with the rash. No throat or tongue irritation, no wheezing. At her visit last week, she reported just some itching and no rash was present. This is now the first time a rash has occurred. No injection site reaction. She denies changes to any other medications. No cough, congestion, or fevers. She is not complaining of joint pain today.     Ms. Sunita Cantor has continued her medications for arthritis (methotrexate, Humira) and reports good tolerance without significant side effects, except now reporting rash 2 days after last Humira injection. Last toxicity monitoring by blood work was done on 1/8/2018 and did not reveal any significant adverse effects, except creatinine 0.92 mg/dL (previously 1.05 mg/dL), eGFR 76 (previously 64). Most recent inflammatory markers from 1/8/2018 revealed a ESR 94 mm/hr (previously 93, 76 mm/hr) and CRP 19.7 mg/L (previously 26.3, 43.6 mg/L). The patient has not had any interval hospital admissions, infections, or surgeries. REVIEW OF SYSTEMS    A comprehensive review of systems was performed and pertinent results are documented in the HPI, review of systems is otherwise non-contributory. PAST MEDICAL HISTORY    She has a past medical history of Anemia; Fibromyalgia; Hypertension; Osteoarthritis; Osteoporosis; Pulmonary embolism ([facility] [address] and Rheumatoid arthritis ([facility] [address] FAMILY HISTORY    Her family history includes Arthritis-rheumatoid in her mother; Cancer in her father; Diabetes in her maternal grandmother and son; Heart Attack in her paternal grandmother; Schizophrenia in her son. SOCIAL HISTORY    She reports that she has never smoked. She has never used smokeless tobacco. She reports that she does not drink alcohol or use illicit drugs. MEDICATIONS    Current Outpatient Prescriptions   Medication Sig Dispense Refill    adalimumab (HUMIRA) 40 mg/0.8 mL injection pen 40 mg by SubCUTAneous route every fourteen (14) days.  predniSONE (DELTASONE) 5 mg tablet Take one tablet po every other day for 4 weeks 15 Tab 0    folic acid (FOLVITE) 1 mg tablet Take 1 Tab by mouth daily for 90 days. 90 Tab 0    methotrexate (RHEUMATREX) 2.5 mg tablet Take 5 Tabs by mouth every Wednesday for 90 days. 65 Tab 0    ergocalciferol (ERGOCALCIFEROL) 50,000 unit capsule Take 1 Cap by mouth every Wednesday. 12 Cap 3    furosemide (LASIX) 20 mg tablet Take 1 Tab by mouth daily as needed.  90 Tab 1    IRON, FERROUS SULFATE, PO Take 65 mg by mouth four (4) times daily.  XARELTO 20 mg tab tablet Take 1 Tab by mouth daily (with breakfast). 90 Tab 0    diphenhydrAMINE (BENADRYL ALLERGY) 25 mg tablet Take 25 mg by mouth every six (6) hours as needed.  multivitamin (ONE A DAY) tablet Take 1 Tab by mouth daily.  bisacodyl 5 mg tab Take  by mouth as needed.  metoprolol tartrate (LOPRESSOR) 50 mg tablet two (2) times a day.  traMADol (ULTRAM) 50 mg tablet 50 mg every six (6) hours as needed. 0    famotidine (PEPCID) 20 mg tablet two (2) times a day. ALLERGIES    Allergies   Allergen Reactions    Clindamycin Swelling and Other (comments)     fever       PHYSICAL EXAMINATION    Visit Vitals    /83 (BP 1 Location: Left arm, BP Patient Position: Sitting)    Pulse 72    Temp 98.2 °F (36.8 °C) (Oral)    Resp 18    Ht 5' 6\" (1.676 m)    Wt 298 lb (135.2 kg)    SpO2 96%    BMI 48.1 kg/m2     Body mass index is 48.1 kg/(m^2). General: Patient is alert, oriented x 3, not in acute distress, daughter at bedside    HEENT:   Mouth is moist without lesions or erythema. No tongue edema. Sclerae are not injected and appear moist.  There is no alopecia. Neck is supple     Cardiovascular:  Heart is regular rate and rhythm, no murmurs. Chest:  Lungs are clear to auscultation bilaterally. Abdomen:  Obese. Extremities:  Free of clubbing, cyanosis, edema    Neurological exam:  Muscle strength is full in upper and lower extremities    Skin exam:  There are no rashes, no alopecia, no discoid lesions, no active Raynaud's, no livedo reticularis, no periungual erythema. Skin is dry   There are 2 mildly erythematous wheals about the size of a nickel on bilateral upper mid-abdomen, below the breasts but not within the intertriginous folds  There is no scale, no vesicles, no discharge, no central clearing, no satellite lesions.    No lesions noted on the chest    Musculoskeletal exam:  A comprehensive musculoskeletal exam was performed for all joints of each upper and lower extremity and assessed for swelling, tenderness and range of motion. Positive results are documented as below:    Joint exam today is unchanged from prior visit, except trigger point is resolved    Decreased ROM of wrists without pain or swelling  Left elbow flexion deformity  Right 3rd PIP flexion deformity  Decreased ROM of ankles without pain or swelling     1/18 tender points (right mid-upper trapezius) - RESOLVED     Z-Deformities:   none  [place] Neck Deformities:  none  Boutonierre's Deformities:  none  Ulnar Deviation:   none     Joint Count 1/8/2018 10/9/2017 8/14/2017 7/31/2017   Patient pain (0-100) 10 0 0 -   MHAQ 0 0 0.125 0.125   Left wrist- Tender 1 - 1 -   Left wrist- Swollen - - 1 -   Right wrist- Tender - - 1 -   Right wrist- Swollen - - 1 -   Right 2nd MCP - Swollen 1 1 - -   Right 3rd MCP - Swollen - 1 - -   Right 2nd PIP - Swollen 1 1 - -   Right 3rd PIP - Swollen - 1 - -   Tender Joint Count (Total) 1 - 2 -   Swollen Joint Count (Total) 2 4 2 -   Physician Assessment (0-10) 1 - 2 -   Patient Assessment (0-10) 1 0 0 -   CDAI Total (calculated) 5 - 6 -       DATA REVIEW    Laboratory     The following laboratory results were reviewed and discussed with the patient:    Office Visit on 01/08/2018   Component Date Value    WBC 01/08/2018 4.9     RBC 01/08/2018 3.63*    HGB 01/08/2018 11.6     HCT 01/08/2018 34.6     MCV 01/08/2018 95     MCH 01/08/2018 32.0     MCHC 01/08/2018 33.5     RDW 01/08/2018 15.2     PLATELET 00/77/8691 921     NEUTROPHILS 01/08/2018 55     Lymphocytes 01/08/2018 32     MONOCYTES 01/08/2018 10     EOSINOPHILS 01/08/2018 2     BASOPHILS 01/08/2018 1     ABS. NEUTROPHILS 01/08/2018 2.7     Abs Lymphocytes 01/08/2018 1.6     ABS. MONOCYTES 01/08/2018 0.5     ABS. EOSINOPHILS 01/08/2018 0.1     ABS. BASOPHILS 01/08/2018 0.0     IMMATURE GRANULOCYTES 01/08/2018 0     ABS. IMM. Yosvany Avendano. 01/08/2018 0.0     Glucose 01/08/2018 93     BUN 01/08/2018 8     Creatinine 01/08/2018 0.92     GFR est non-AA 01/08/2018 66     GFR est AA 01/08/2018 76     BUN/Creatinine ratio 01/08/2018 9*    Sodium 01/08/2018 141     Potassium 01/08/2018 4.2     Chloride 01/08/2018 101     CO2 01/08/2018 22     Calcium 01/08/2018 9.3     Protein, total 01/08/2018 7.5     Albumin 01/08/2018 3.9     GLOBULIN, TOTAL 01/08/2018 3.6     A-G Ratio 01/08/2018 1.1*    Bilirubin, total 01/08/2018 0.3     Alk. phosphatase 01/08/2018 80     AST (SGOT) 01/08/2018 14     ALT (SGPT) 01/08/2018 10     C-Reactive Protein, Qt 01/08/2018 19.7*    Sed rate (ESR) 01/08/2018 94*   Office Visit on 10/09/2017   Component Date Value    WBC 10/09/2017 4.9     RBC 10/09/2017 3.50*    HGB 10/09/2017 10.9*    HCT 10/09/2017 33.5*    MCV 10/09/2017 96     MCH 10/09/2017 31.1     MCHC 10/09/2017 32.5     RDW 10/09/2017 15.7*    PLATELET 29/49/7134 172     NEUTROPHILS 10/09/2017 76     Lymphocytes 10/09/2017 18     MONOCYTES 10/09/2017 5     EOSINOPHILS 10/09/2017 1     BASOPHILS 10/09/2017 0     ABS. NEUTROPHILS 10/09/2017 3.7     Abs Lymphocytes 10/09/2017 0.9     ABS. MONOCYTES 10/09/2017 0.3     ABS. EOSINOPHILS 10/09/2017 0.0     ABS. BASOPHILS 10/09/2017 0.0     IMMATURE GRANULOCYTES 10/09/2017 0     ABS. IMM. GRANS. 10/09/2017 0.0     Glucose 10/09/2017 88     BUN 10/09/2017 10     Creatinine 10/09/2017 1.05*    GFR est non-AA 10/09/2017 56*    GFR est AA 10/09/2017 64     BUN/Creatinine ratio 10/09/2017 10*    Sodium 10/09/2017 139     Potassium 10/09/2017 4.2     Chloride 10/09/2017 100     CO2 10/09/2017 21     Calcium 10/09/2017 9.4     Protein, total 10/09/2017 7.1     Albumin 10/09/2017 3.8     GLOBULIN, TOTAL 10/09/2017 3.3     A-G Ratio 10/09/2017 1.2     Bilirubin, total 10/09/2017 0.2     Alk.  phosphatase 10/09/2017 76     AST (SGOT) 10/09/2017 10     ALT (SGPT) 10/09/2017 7     C-Reactive Protein, Qt 10/09/2017 26.3*    Sed rate (ESR) 10/09/2017 93*       Imaging    Musculoskeletal Ultrasound    None    Radiographs    Bilateral Hand 7/31/2017: RIGHT: no fracture. There is diffuse osteopenia. Both corticated and non corticated erosive changes are present involving the radiocarpal joint, carpal joints, and metacarpal carpal joints. In addition, there is involvement of the first and second metacarpal phalangeal joints, most pronounced in the first. The lunate and radius appear ankylosed. LEFT:  no fracture. There is diffuse osteopenia. Both corticated and non corticated erosive changes are present involving the radiocarpal joint, carpal joints, and metacarpal carpal joints. The lunate and radius appear ankylosed. Some erosive changes are also present in the third PIP joint. The scaphoid lunate distance is widened. Left Elbow 7/31/2017: marked flattening of the radial head with sclerosis. In erosive changes also present of the proximal ulna. A moderate joint effusion is seen. Use osteopenia is noted. Bilateral Foot 7/31/2017: RIGHT: diffuse osteopenia. Non corticated erosions are present involving the second, fourth, and fifth MTP joints. Associated soft tissue swelling is present. The there may be an ankylosis of the fourth and third metatarsals to the midfoot. Flattening of the second metatarsal head is noted. There is a small Achilles and moderate the plantar spur. No fracture is seen. LEFT: diffuse osteopenia. Erosive changes are present at the second and third metatarsal tarsal joints. A small spur is noted at the Achilles insertion. No fractures identified. Less forefoot soft tissue swelling is present. No fracture is seen    Chest 1/30/2017: lungs remain clear. No pneumothorax or pleural effusion apparent. Cardiac silhouette remains large. Endotracheal tube and nasogastric tube have been removed.  Left central line stable in position with tip projecting over the superior vena cava. Right Hip 4/26/2016: no fracture or dislocation. The right hip joint spaces normal and symmetric with the left side. Enthesophytes are seen along either iliac crest and there is relatively severe bilateral facet hypertrophy at L5-S1. The sacroiliac joints appear grossly normal.     Right Femur 4/26/2016: the patient is status post prior placement of a 3 component right total knee prosthesis. From this examination a full assessment of the prosthesis is limited. Grossly this examination is negative for a loosening of the prosthetic components, heterotopic ossification, or additional complications of the prosthesis. The joint spaces of the right hip are well maintained without significant osteoarthritis. This examination is negative for a fracture or an insufficiency fracture of the proximal femur. This examination examination is negative for focal lytic or blastic lesions of the right femur. CT Imaging    CT abdomen and pelvis without contrast 7/31/2017:there are linear densities at both lung bases suggesting scarring or subsegmental atelectasis. The lung bases are otherwise clear no pleural effusion is seen. The liver spleen and pancreas are unremarkable. No renal stone or mass is seen. No ureteral dilatation or stone is seen. The urinary bladder is unremarkable. A urinary catheter is noted with tip within the urinary bladder. Small calcifications within the uterus are again noted. The uterus and pelvic adnexa are otherwise unremarkable. No dilated bowel or free air or free fluid is seen. Specifically there is no evidence of retroperitoneal or intra-abdominal hemorrhage. An inferior vena cava filter is noted. MR Imaging    MRI Lumbar Spine with and without contrast 4/26/2016: study is suboptimal secondary to patient's body habitus. T12-L1: Normal for age. L1-L2: Normal for age. L2-L3: There is mild facet joint arthropathy. L3-L4: There is mild disc disease with concentric bulge.  Moderate to severe facet arthropathy is present with bony hypertrophy and ligamentous thickening. There is moderate central canal stenosis and lateral recess narrowing. Bilateral foraminal narrowing is present. There is absence of fat within the right neural foramen suggesting involvement of the exiting L3 nerve root. L4-L5: There is mild disc disease with loss of disc height. Moderate to severe facet arthropathy is present with bony hypertrophy and ligamentous thickening. There is moderate central canal stenosis primarily in a transverse direction. Severe lateral recess narrowing is present with moderate bilateral foraminal narrowing. Absence of fat within the right neural foramen suggests involvement of the exiting right L4 nerve root. L5-S1: There is mild disc degeneration with loss of disc height. Moderate to severe facet arthropathy is present with bony hypertrophy and ligamentous thickening. No significant canal stenosis. There is mild lateral recess narrowing. Bilateral neural foraminal narrowing is also present, right greater than left. Absence of fat within the right neural foramen suggests involvement of the exiting right L5 nerve root. Visualized portions of the sacrum are normal. There is no abnormal enhancement. The conus is normal in contour and signal characteristics. Paraspinal soft tissues are unremarkable. DXA     DXA 8/28/2017: (excluded None) showed lumbar spine L1-L4 T score 1.2 (BMD 1.345 g/cm2), left femoral neck T score -0.9 (0.919 g/cm2), left total hip T score: 0.1 (1.020 g/cm2), right femoral neck T score: -0.4 (0.977 g/cm2), right total hip T score: -0.6 (0.938 g/cm2), and distal one third left radius T score N/A (BMD N/A g/cm2). FRAX score 5.8 % probability in 10 years for major osteoporotic fracture and 0.4 % 10 year probability of hip fracture. ASSESSMENT AND PLAN    This is an acute visit for Ms. Maryanne Dumont. 1) Seropositive Erosive Rheumatoid Arthritis.  Today is an acute visit because she has a rash after taking her Humira injection. At her visit last week, her CDAI was 5 (previously N/A) with 1 tender and 2 swollen joints. Last week I maintained her on methotrexate 12.5 mg every Wednesday and Humira 40 mg every 14 days (10/31/2017). Last week her prednisone 5 mg daily was tapered to prednisone 5 mg every other day for 4 weeks, then she is to stop prednisone completely. Kelsi Newton was denied by her insurance due to preferring an anti-TNF. Methotrexate is not increased due to CKD Stage 2). Last week she reported itching without rash that I felt was eczema. However, after her last injection of Humira on 1/8/2018, she reports hives appearing on 1/10/2018. She has 2 wheals on her abdomen on her exam today. With itching that has now progressed to rash that is associated in time to her Humira, I am concerned this could be a drug reaction. I have asked her to stop the Humira. We will switch her to Enbrel. She understands that Enbrel is also an anti-TNF like Humira, but a different chemical make-up. She is to continue current methotrexate 12.5 mg every Wednesday and  prednisone 5 mg every other day. An order for Enbrel has been submitted to begin prior authorization. She and her daughter were instructed by the nurse with an Enbrel teaching pen today. 2) Rash. After her last injection of Humira on 1/8/2018, she reports hives appearing on 1/10/2018 on her chest and abdomen. She is taking benadryl twice daily with improvement. At her office visit last week, she had reported itching for 2 weeks without rash that I felt was eczema. No injection site reaction. She had 2 wheals on her abdomen today, making this most consistent with a drug reaction. It did not clinically appear to be tinea corporis, candida, staph infection, herpes zoster or psoriatic. I have asked her to stop Humira and we will switch to Enbrel (see #1). 3) Long Term Use of Immunosuppressants.  The patient remains on immunomodulatory medications (methotrexate, stopping Humira today) and requires frequent toxicity monitoring by blood work. Respective labs were done last week (CBC and CMP).    4) Long Term Use of Systemic Steroids (Prednisone). She has been on chronic daily prednisone for years. She tapered from prednisone 10 mg daily to 5 mg daily October 2017. Last week I further tapered her off by taking prednisone 5 mg every other day for one month, and then stop. DXA 8/28/2017 did not show osteopenia.      5) Fibromyalgia. Her history, constellation of symptoms, and examination are consistent with a pain syndrome, possiblity secondary to Rheumatoid Arthritis. Last week she had one active trigger point which is resolved today with daily stretching and exercise.       6) Morbid Obesity. Her BMI was 48.10 (previously 47.78, 48.97, 48.74). Weight loss is recommended. 7) Vitamin D Deficiency. Her vitamin D level was 24.7. She is on weekly ergocalciferol 50,000. 8) CKD Stage 2. Her creatinine was 0.92 mg/dL, eGFR 76. I will follow closely while on methotrexate and avoid increasing her dose. 9) Right Sided Carpal Tunnel Syndrome. She reports tingling in her right hand and has positive Tinel's on the right. She has a splint at home to wear at bedtime. The patient voiced understanding of the aforementioned assessment and plan. Summary of plan was provided in the After Visit Summary patient instructions. TODAY'S ORDERS  Orders Placed This Encounter    ENBREL SURECLICK 50 mg/mL (4.43 mL) injection           Future Appointments  Date Time Provider Constance Abreu   1/29/2018 11:00 AM Young Saint, MD [facility]   4/9/2018 2:20 PM Eileen Akhtar MD [address]    Adult Rheumatology  [facility] [address] [place] [address]   Phone [phone]  Fax [phone]

## 2018-01-15 NOTE — PATIENT INSTRUCTIONS
Stop Humira. Expect a call from [place] [phone] regarding Enbrel. If the copay is too high, then ask to start the process for patient assistance.

## 2018-01-15 NOTE — PROGRESS NOTES
Chief Complaint   Patient presents with    Medication Evaluation     Pt c/o rash on chest after taking Humira. Pt states rash is starting to go down, she has been taking Benadryl. 1. Have you been to the ER, urgent care clinic since your last visit? Hospitalized since your last visit? No    2. Have you seen or consulted any other health care providers outside of the [address] [place] [address] since your last visit? Include any pap smears or colon screening.  No

## 2018-01-22 ENCOUNTER — TELEPHONE (OUTPATIENT)
Dept: RHEUMATOLOGY | Age: 68
End: 2018-01-22

## 2018-01-29 ENCOUNTER — OFFICE VISIT (OUTPATIENT)
Dept: INTERNAL MEDICINE CLINIC | Age: 68
End: 2018-01-29

## 2018-01-29 VITALS
RESPIRATION RATE: 18 BRPM | OXYGEN SATURATION: 96 % | SYSTOLIC BLOOD PRESSURE: 134 MMHG | DIASTOLIC BLOOD PRESSURE: 73 MMHG | HEART RATE: 87 BPM | HEIGHT: 66 IN | BODY MASS INDEX: 47.09 KG/M2 | TEMPERATURE: 96.4 F | WEIGHT: 293 LBS

## 2018-01-29 DIAGNOSIS — E66.01 MORBID OBESITY WITH BMI OF 45.0-49.9, ADULT (HCC): ICD-10-CM

## 2018-01-29 DIAGNOSIS — I10 ESSENTIAL HYPERTENSION: Primary | ICD-10-CM

## 2018-01-29 DIAGNOSIS — M05.79 SEROPOSITIVE RHEUMATOID ARTHRITIS OF MULTIPLE SITES (HCC): ICD-10-CM

## 2018-01-29 DIAGNOSIS — E55.9 VITAMIN D DEFICIENCY: ICD-10-CM

## 2018-01-29 DIAGNOSIS — I27.82 OTHER CHRONIC PULMONARY EMBOLISM WITHOUT ACUTE COR PULMONALE (HCC): ICD-10-CM

## 2018-01-29 DIAGNOSIS — D64.9 ANEMIA, UNSPECIFIED TYPE: ICD-10-CM

## 2018-01-29 NOTE — MR AVS SNAPSHOT
[address] [place] [address]. [address] [place] [address] 
[phone] Patient: Sharon Davis MRN: [id number] DAA:[id number] Visit Information Date & Time Provider Department Dept. Phone Encounter #  
 1/29/2018 11:00 AM Daniel Dumont MD [facility] [phone] [id number] Follow-up Instructions Return in about 6 months (around 7/29/2018) for medicare wellness and fasting blood work,. Your Appointments 4/9/2018  2:20 PM  
ESTABLISHED PATIENT with Nikki Potts MD  
[address] ([facility]) Appt Note: 3 month f/up  
 [address] [facility] [address]  
[phone]  
  
   
 [address] [facility] [address] Upcoming Health Maintenance Date Due DTaP/Tdap/Td series (1 - Tdap) 10/2/1973 GLAUCOMA SCREENING Q2Y 10/2/2017 BREAST CANCER SCRN MAMMOGRAM 7/31/2018 MEDICARE YEARLY EXAM 8/1/2018 Pneumococcal 65+ Low/Medium Risk (1 of 2 - PCV13) 8/15/2018 COLONOSCOPY 7/31/2027 Allergies as of 1/29/2018  Review Complete On: 1/29/2018 By: Pau Godinez MD  
  
 Severity Noted Reaction Type Reactions Clindamycin  12/18/2017    Swelling, Other (comments) fever Humira [Adalimumab]  01/15/2018    Hives Current Immunizations  Reviewed on 1/29/2018 Name Date Influenza Vaccine 8/15/2017 Pneumococcal Polysaccharide (PPSV-23) 8/15/2017 TB Skin Test (PPD) Intradermal 8/14/2017 Zoster Vaccine, Live 8/15/2017 Reviewed by Pau Godinez MD on 1/29/2018 at 11:40 AM  
You Were Diagnosed With   
  
 Codes Comments Essential hypertension    -  Primary ICD-10-CM: I10 
ICD-9-CM: 401.9 Vitamin D deficiency     ICD-10-CM: E55.9 ICD-9-CM: 268.9 Vitals BP Pulse Temp Resp Height(growth percentile) 134/73 (BP 1 Location: Left arm, BP Patient Position: Sitting) 87 96.4 °F (35.8 °C) (Temporal) 18 5' 6\" (1.676 m) Weight(growth percentile) SpO2 BMI OB Status Smoking Status 305 lb (138.3 kg) 96% 49.23 kg/m2 Postmenopausal Never Smoker Vitals History BMI and BSA Data Body Mass Index Body Surface Area  
 49.23 kg/m 2 2.54 m 2 Preferred Pharmacy Pharmacy Name Phone [facility] [phone] Your Updated Medication List  
  
   
This list is accurate as of: 1/29/18 11:45 AM.  Always use your most recent med list.  
  
  
  
  
 BENADRYL ALLERGY 25 mg tablet Generic drug:  diphenhydrAMINE Take 25 mg by mouth every six (6) hours as needed. bisacodyl 5 mg Tab Take  by mouth as needed. ENBREL SURECLICK 50 mg/mL (2.79 mL) injection Generic drug:  etanercept  
0.98 mL by SubCUTAneous route every seven (7) days for 90 days. ergocalciferol 50,000 unit capsule Commonly known as:  ERGOCALCIFEROL Take 1 Cap by mouth every Wednesday. famotidine 20 mg tablet Commonly known as:  PEPCID  
two (2) times a day. folic acid 1 mg tablet Commonly known as:  Google Take 1 Tab by mouth daily for 90 days. furosemide 20 mg tablet Commonly known as:  LASIX Take 1 Tab by mouth daily as needed. IRON (FERROUS SULFATE) PO Take 65 mg by mouth four (4) times daily. methotrexate 2.5 mg tablet Commonly known as:  Beata Dash Take 5 Tabs by mouth every Wednesday for 90 days. metoprolol tartrate 50 mg tablet Commonly known as:  LOPRESSOR  
two (2) times a day. multivitamin tablet Commonly known as:  ONE A DAY Take 1 Tab by mouth daily. XARELTO 20 mg Tab tablet Generic drug:  rivaroxaban Take 1 Tab by mouth daily (with breakfast). We Performed the Following VITAMIN D, 25 HYDROXY [id number] CPT(R)] Follow-up Instructions Return in about 6 months (around 7/29/2018) for medicare wellness and fasting blood work,. Patient Instructions Body Mass Index: Care Instructions Your Care Instructions Body mass index (BMI) can help you see if your weight is raising your risk for health problems. It uses a formula to compare how much you weigh with how tall you are. · A BMI lower than 18.5 is considered underweight. · A BMI between 18.5 and 24.9 is considered healthy. · A BMI between 25 and 29.9 is considered overweight. A BMI of 30 or higher is considered obese. If your BMI is in the normal range, it means that you have a lower risk for weight-related health problems. If your BMI is in the overweight or obese range, you may be at increased risk for weight-related health problems, such as high blood pressure, heart disease, stroke, arthritis or joint pain, and diabetes. If your BMI is in the underweight range, you may be at increased risk for health problems such as fatigue, lower protection (immunity) against illness, muscle loss, bone loss, hair loss, and hormone problems. BMI is just one measure of your risk for weight-related health problems. You may be at higher risk for health problems if you are not active, you eat an unhealthy diet, or you drink too much alcohol or use tobacco products. Follow-up care is a key part of your treatment and safety. Be sure to make and go to all appointments, and call your doctor if you are having problems. It's also a good idea to know your test results and keep a list of the medicines you take. How can you care for yourself at home? · Practice healthy eating habits. This includes eating plenty of fruits, vegetables, whole grains, lean protein, and low-fat dairy. · If your doctor recommends it, get more exercise. Walking is a good choice. Bit by bit, increase the amount you walk every day. Try for at least 30 minutes on most days of the week. · Do not smoke. Smoking can increase your risk for health problems.  If you need help quitting, talk to your doctor about stop-smoking programs and medicines. These can increase your chances of quitting for good. · Limit alcohol to 2 drinks a day for men and 1 drink a day for women. Too much alcohol can cause health problems. If you have a BMI higher than 25 · Your doctor may do other tests to check your risk for weight-related health problems. This may include measuring the distance around your waist. A waist measurement of more than 40 inches in men or 35 inches in women can increase the risk of weight-related health problems. · Talk with your doctor about steps you can take to stay healthy or improve your health. You may need to make lifestyle changes to lose weight and stay healthy, such as changing your diet and getting regular exercise. If you have a BMI lower than 18.5 · Your doctor may do other tests to check your risk for health problems. · Talk with your doctor about steps you can take to stay healthy or improve your health. You may need to make lifestyle changes to gain or maintain weight and stay healthy, such as getting more healthy foods in your diet and doing exercises to build muscle. Where can you learn more? Go to http://ankur-sam.info/. Enter S176 in the search box to learn more about \"Body Mass Index: Care Instructions. \" Current as of: October 13, 2016 Content Version: 11.4 © 8622-3851 Healthwise, Incorporated. Care instructions adapted under license by [employer] (which disclaims liability or warranty for this information). If you have questions about a medical condition or this instruction, always ask your healthcare professional. [place] [address] any warranty or liability for your use of this information. Introducing [facility]! [facility] introduces Znode patient portal. Now you can access parts of your medical record, email your doctor's office, and request medication refills online.    
 
1. In your internet browser, go to [email or web address] 2. Click on the First Time User? Click Here link in the Sign In box. You will see the New Member Sign Up page. 3. Enter your [employer] Access Code exactly as it appears below. You will not need to use this code after youve completed the sign-up process. If you do not sign up before the expiration date, you must request a new code. · [employer] Access Code: [email or web address] Expires: 4/8/2018 10:22 AM 
 
4. Enter the last four digits of your Social Security Number (xxxx) and Date of Birth (mm/dd/yyyy) as indicated and click Submit. You will be taken to the next sign-up page. 5. Create a [employer] ID. This will be your [employer] login ID and cannot be changed, so think of one that is secure and easy to remember. 6. Create a [employer] password. You can change your password at any time. 7. Enter your Password Reset Question and Answer. This can be used at a later time if you forget your password. 8. Enter your e-mail address. You will receive e-mail notification when new information is available in [address] 9. Click Sign Up. You can now view and download portions of your medical record. 10. Click the Download Summary menu link to download a portable copy of your medical information. If you have questions, please visit the Frequently Asked Questions section of the [employer] website. Remember, [employer] is NOT to be used for urgent needs. For medical emergencies, dial 911. Now available from your iPhone and Android! Please provide this summary of care documentation to your next provider. Your primary care clinician is listed as Daniel Donohue. If you have any questions after today's visit, please call [phone].

## 2018-01-29 NOTE — PATIENT INSTRUCTIONS
Body Mass Index: Care Instructions  Your Care Instructions    Body mass index (BMI) can help you see if your weight is raising your risk for health problems. It uses a formula to compare how much you weigh with how tall you are. · A BMI lower than 18.5 is considered underweight. · A BMI between 18.5 and 24.9 is considered healthy. · A BMI between 25 and 29.9 is considered overweight. A BMI of 30 or higher is considered obese. If your BMI is in the normal range, it means that you have a lower risk for weight-related health problems. If your BMI is in the overweight or obese range, you may be at increased risk for weight-related health problems, such as high blood pressure, heart disease, stroke, arthritis or joint pain, and diabetes. If your BMI is in the underweight range, you may be at increased risk for health problems such as fatigue, lower protection (immunity) against illness, muscle loss, bone loss, hair loss, and hormone problems. BMI is just one measure of your risk for weight-related health problems. You may be at higher risk for health problems if you are not active, you eat an unhealthy diet, or you drink too much alcohol or use tobacco products. Follow-up care is a key part of your treatment and safety. Be sure to make and go to all appointments, and call your doctor if you are having problems. It's also a good idea to know your test results and keep a list of the medicines you take. How can you care for yourself at home? · Practice healthy eating habits. This includes eating plenty of fruits, vegetables, whole grains, lean protein, and low-fat dairy. · If your doctor recommends it, get more exercise. Walking is a good choice. Bit by bit, increase the amount you walk every day. Try for at least 30 minutes on most days of the week. · Do not smoke. Smoking can increase your risk for health problems. If you need help quitting, talk to your doctor about stop-smoking programs and medicines. These can increase your chances of quitting for good. · Limit alcohol to 2 drinks a day for men and 1 drink a day for women. Too much alcohol can cause health problems. If you have a BMI higher than 25  · Your doctor may do other tests to check your risk for weight-related health problems. This may include measuring the distance around your waist. A waist measurement of more than 40 inches in men or 35 inches in women can increase the risk of weight-related health problems. · Talk with your doctor about steps you can take to stay healthy or improve your health. You may need to make lifestyle changes to lose weight and stay healthy, such as changing your diet and getting regular exercise. If you have a BMI lower than 18.5  · Your doctor may do other tests to check your risk for health problems. · Talk with your doctor about steps you can take to stay healthy or improve your health. You may need to make lifestyle changes to gain or maintain weight and stay healthy, such as getting more healthy foods in your diet and doing exercises to build muscle. Where can you learn more? Go to http://ankur-sam.info/. Enter S176 in the search box to learn more about \"Body Mass Index: Care Instructions. \"  Current as of: October 13, 2016  Content Version: 11.4  © 5427-6260 Healthwise, Incorporated. Care instructions adapted under license by [employer] (which disclaims liability or warranty for this information). If you have questions about a medical condition or this instruction, always ask your healthcare professional. [address] any warranty or liability for your use of this information.

## 2018-01-29 NOTE — PROGRESS NOTES
Subjective:     Chief Complaint   Patient presents with    Anemia     6 mo f/u        She  is a 74y.o. year old female with h/o RA, HTN, PE  who presents today for 6 months follow up. She is with her daughter today. She has been following up with Rheumatologist Dr. Analisa Briseno. States that she had some rash with Humira, now in the process of getting approval for new med for RA. Reports that her pain is manageable. 1. Low Vit D: She has been taking weekly Vit D. Lab Results   Component Value Date/Time    VITAMIN D, 25-HYDROXY 24.7 07/31/2017 02:00 PM       2. Anemia: has been well controled with iron supplement. Lab Results   Component Value Date/Time    HGB 11.6 01/08/2018 10:29 AM     3. HTN: well controlled with metoprolol. 4. PE: Takes Xarelto regularly. Beside knee pain, shoulder pain she say she is doing fine. Denies any chest pain, soa, diarrhea, bloody stool. Pertinent items are noted in HPI. Objective:     Vitals:    01/29/18 1106   BP: 134/73   Pulse: 87   Resp: 18   Temp: 96.4 °F (35.8 °C)   TempSrc: Temporal   SpO2: 96%   Weight: 305 lb (138.3 kg)   Height: 5' 6\" (1.676 m)       Physical Examination: General appearance - alert, well appearing, and in no distress, oriented to person, place, and time and overweight  Mental status - alert, oriented to person, place, and time, normal mood, behavior, speech, dress, motor activity, and thought processes  Chest - clear to auscultation, no wheezes, rales or rhonchi, symmetric air entry  Heart - normal rate, regular rhythm, normal S1, S2, no murmurs, rubs, clicks or gallops  Extremities - pedal edema 1 + bilaterally.      Allergies   Allergen Reactions    Clindamycin Swelling and Other (comments)     fever    Humira [Adalimumab] Hives      Social History     Social History    Marital status:      Spouse name: N/A    Number of children: N/A    Years of education: N/A     Social History Main Topics    Smoking status: Never Smoker    Smokeless tobacco: Never Used    Alcohol use No    Drug use: No    Sexual activity: Not Asked     Other Topics Concern    None     Social History Narrative      Family History   Problem Relation Age of Onset   [facility] Arthritis-rheumatoid Mother     Cancer Father      lung    Diabetes Maternal Grandmother     Heart Attack Paternal Grandmother     Diabetes Son     Schizophrenia Son       Past Surgical History:   Procedure Laterality Date    HX KNEE REPLACEMENT Bilateral     HX TUBAL LIGATION        Past Medical History:   Diagnosis Date    Anemia     Fibromyalgia     Hypertension     Osteoarthritis     Osteoporosis     Pulmonary embolism ([address]     Rheumatoid arthritis ([facility] [address]       Current Outpatient Prescriptions   Medication Sig Dispense Refill    folic acid (FOLVITE) 1 mg tablet Take 1 Tab by mouth daily for 90 days. 90 Tab 0    methotrexate (RHEUMATREX) 2.5 mg tablet Take 5 Tabs by mouth every Wednesday for 90 days. 65 Tab 0    ergocalciferol (ERGOCALCIFEROL) 50,000 unit capsule Take 1 Cap by mouth every Wednesday. 12 Cap 3    furosemide (LASIX) 20 mg tablet Take 1 Tab by mouth daily as needed. 90 Tab 1    IRON, FERROUS SULFATE, PO Take 65 mg by mouth four (4) times daily.  XARELTO 20 mg tab tablet Take 1 Tab by mouth daily (with breakfast). 90 Tab 0    diphenhydrAMINE (BENADRYL ALLERGY) 25 mg tablet Take 25 mg by mouth every six (6) hours as needed.  multivitamin (ONE A DAY) tablet Take 1 Tab by mouth daily.  bisacodyl 5 mg tab Take  by mouth as needed.  metoprolol tartrate (LOPRESSOR) 50 mg tablet two (2) times a day.  famotidine (PEPCID) 20 mg tablet two (2) times a day.  ENBREL SURECLICK 50 mg/mL (4.27 mL) injection 0.98 mL by SubCUTAneous route every seven (7) days for 90 days. 3.92 mL 2        Assessment/ Plan:   Diagnoses and all orders for this visit:    1. Essential hypertension       - BP is well controlled with metoprolol.    2. Vitamin D deficiency  - Currently taking weekly vit D 50,000 iu . Will call with result and further recommendation.  -       VITAMIN D, 25 HYDROXY    3. Anemia, unspecified type        - hemoglobin level is stable with iron supplement. 4. Seropositive rheumatoid arthritis of multiple sites [facility])       -   Per Dr. Sophie Majano. 5. Other chronic pulmonary embolism without acute cor pulmonale (HCC)       - continue Xarelto  6. Morbid obesity with BMI of 45.0-49.9, adult [facility])  Discussed the patient's BMI with her. The BMI follow up plan is as follows:     dietary management education, guidance, and counseling  encourage exercise. Advised for water aerobic exercise. monitor weight  prescribed dietary intake    An After Visit Summary was printed and given to the patient. Medication risks/benefits/costs/interactions/alternatives discussed with patient. Advised patient to call back or return to office if symptoms worsen/change/persist. If patient cannot reach us or should anything more severe/urgent arise he/she should proceed directly to the nearest emergency department. Discussed expected course/resolution/complications of diagnosis in detail with patient. Patient given a written after visit summary which includes her diagnoses, current medications and vitals. Patient expressed understanding with the diagnosis and plan. Follow-up Disposition:  Return in about 6 months (around 7/29/2018) for medicare wellness and fasting blood work,.

## 2018-01-30 ENCOUNTER — TELEPHONE (OUTPATIENT)
Dept: INTERNAL MEDICINE CLINIC | Age: 68
End: 2018-01-30

## 2018-01-30 LAB — 25(OH)D3+25(OH)D2 SERPL-MCNC: 41.1 NG/ML (ref 30–100)

## 2018-01-30 NOTE — TELEPHONE ENCOUNTER
----- Message from Ruthy Javier MD sent at 1/30/2018  9:10 AM EST -----  Please call her to let her know that Vit D level is normal. Continue taking weekly vit D supplement.

## 2018-01-30 NOTE — PROGRESS NOTES
Please call her to let her know that Vit D level is normal. Continue taking weekly vit D supplement.

## 2018-02-20 ENCOUNTER — TELEPHONE (OUTPATIENT)
Dept: RHEUMATOLOGY | Age: 68
End: 2018-02-20

## 2018-02-20 NOTE — TELEPHONE ENCOUNTER
Spoke with patient after using 2 identifiers, and informed her Enbrel Sureclick has been approved by Prakash, [id number], good 1/27/2018-1/27/2020. Patient states she has also received her Enbrel Sureclick.

## 2018-02-23 ENCOUNTER — TELEPHONE (OUTPATIENT)
Dept: RHEUMATOLOGY | Age: 68
End: 2018-02-23

## 2018-02-23 RX ORDER — PREDNISONE 5 MG/1
TABLET ORAL
Qty: 70 TAB | Refills: 0 | Status: SHIPPED | OUTPATIENT
Start: 2018-02-23 | End: 2018-04-16 | Stop reason: ALTCHOICE

## 2018-02-23 NOTE — TELEPHONE ENCOUNTER
Patient informed Prednisone taper will be sent tp the pharmacy. Patient requested [facility] [phone]. Prescription called to [facility] informed using [facility].

## 2018-02-23 NOTE — TELEPHONE ENCOUNTER
This does not sound like Rheumatoid Arthritis based on complaint, but I wonder if it is Polymyalgia Rheumatica symptoms. She did not have this on her last visit. We can try her on prednisone taper. I'll send one it, let her know.

## 2018-02-23 NOTE — TELEPHONE ENCOUNTER
Patient called thru [facility] stating she is in intense pain to the point that she cant walk. Patient would like a call back from the nurse.

## 2018-03-15 ENCOUNTER — OFFICE VISIT (OUTPATIENT)
Dept: RHEUMATOLOGY | Age: 68
End: 2018-03-15

## 2018-03-15 VITALS
SYSTOLIC BLOOD PRESSURE: 157 MMHG | BODY MASS INDEX: 47.09 KG/M2 | RESPIRATION RATE: 20 BRPM | HEART RATE: 98 BPM | HEIGHT: 66 IN | WEIGHT: 293 LBS | TEMPERATURE: 98.4 F | DIASTOLIC BLOOD PRESSURE: 87 MMHG

## 2018-03-15 DIAGNOSIS — R07.89 COSTOCHONDRAL CHEST PAIN: ICD-10-CM

## 2018-03-15 DIAGNOSIS — E55.9 VITAMIN D DEFICIENCY: ICD-10-CM

## 2018-03-15 DIAGNOSIS — M79.89 LEG SWELLING: ICD-10-CM

## 2018-03-15 DIAGNOSIS — N18.2 CKD (CHRONIC KIDNEY DISEASE) STAGE 2, GFR 60-89 ML/MIN: ICD-10-CM

## 2018-03-15 DIAGNOSIS — E66.01 MORBID OBESITY WITH BMI OF 45.0-49.9, ADULT (HCC): ICD-10-CM

## 2018-03-15 DIAGNOSIS — Z79.60 LONG-TERM USE OF IMMUNOSUPPRESSANT MEDICATION: ICD-10-CM

## 2018-03-15 DIAGNOSIS — M05.79 SEROPOSITIVE RHEUMATOID ARTHRITIS OF MULTIPLE SITES (HCC): Primary | ICD-10-CM

## 2018-03-15 RX ORDER — DEXTROMETHORPHAN HYDROBROMIDE, GUAIFENESIN 5; 100 MG/5ML; MG/5ML
650 LIQUID ORAL AS NEEDED
COMMUNITY

## 2018-03-15 RX ORDER — PROBENECID AND COLCHICINE 500; .5 MG/1; MG/1
1 TABLET ORAL 2 TIMES DAILY
Qty: 60 TAB | Refills: 1 | Status: SHIPPED | OUTPATIENT
Start: 2018-03-15 | End: 2018-04-14

## 2018-03-15 RX ORDER — METHOTREXATE 2.5 MG/1
15 TABLET ORAL
Qty: 72 TAB | Refills: 0 | Status: SHIPPED | OUTPATIENT
Start: 2018-03-21 | End: 2018-04-16 | Stop reason: SDUPTHER

## 2018-03-15 NOTE — MR AVS SNAPSHOT
[address] 
[phone] Patient: Baylee Durbin MRN: [id number] EXT:[id number] Visit Information Date & Time Provider Department Dept. Phone Encounter #  
 3/15/2018  4:00 PM Esteban Arriola [id number] Follow-up Instructions Return in about 4 weeks (around 4/12/2018). Your Appointments 4/9/2018  2:20 PM  
ESTABLISHED PATIENT with MD Esteban Arriola [facility] Appt Note: 3 month f/up  
 [facility] [address]  
[phone]  
  
   
 [facility] [address]a May[address] [id number] Upcoming Health Maintenance Date Due DTaP/Tdap/Td series (1 - Tdap) 10/2/1973 GLAUCOMA SCREENING Q2Y 10/2/2017 BREAST CANCER SCRN MAMMOGRAM 7/31/2018 MEDICARE YEARLY EXAM 8/1/2018 Pneumococcal 65+ Low/Medium Risk (1 of 2 - PCV13) 8/15/2018 COLONOSCOPY 7/31/2027 Allergies as of 3/15/2018  Review Complete On: 3/15/2018 By: Kenzie Givens MD  
  
 Severity Noted Reaction Type Reactions Clindamycin  12/18/2017    Swelling, Other (comments) fever Humira [Adalimumab]  01/15/2018    Hives Current Immunizations  Reviewed on 1/29/2018 Name Date Influenza Vaccine 8/15/2017 Pneumococcal Polysaccharide (PPSV-23) 8/15/2017 TB Skin Test (PPD) Intradermal 8/14/2017 Zoster Vaccine, Live 8/15/2017 Not reviewed this visit You Were Diagnosed With   
  
 Codes Comments Seropositive rheumatoid arthritis of multiple sites [facility])    -  Primary ICD-10-CM: M05.79 ICD-9-CM: 714.0 Long-term use of immunosuppressant medication     ICD-10-CM: Z79.899 ICD-9-CM: V58.69 Costochondral chest pain     ICD-10-CM: R07.1 ICD-9-CM: 786.52 Vitals BP Pulse Temp Resp Height(growth percentile) Weight(growth percentile) 157/87 98 98.4 °F (36.9 °C) 20 5' 6\" (1.676 m) 302 lb (137 kg) BMI OB Status Smoking Status 48.74 kg/m2 Postmenopausal Never Smoker BMI and BSA Data Body Mass Index Body Surface Area 48.74 kg/m 2 2.53 m 2 Preferred Pharmacy Pharmacy Name Phone [facility] [address], [address] [place] [address] Kalen Day [phone] Your Updated Medication List  
  
   
This list is accurate as of 3/15/18  4:25 PM.  Always use your most recent med list.  
  
  
  
  
 bisacodyl 5 mg Tab Take  by mouth as needed. ENBREL SURECLICK 50 mg/mL (5.72 mL) injection Generic drug:  etanercept  
0.98 mL by SubCUTAneous route every seven (7) days for 90 days. ergocalciferol 50,000 unit capsule Commonly known as:  ERGOCALCIFEROL Take 1 Cap by mouth every Wednesday. famotidine 20 mg tablet Commonly known as:  PEPCID  
two (2) times a day. folic acid 1 mg tablet Commonly known as:  Google Take 1 Tab by mouth daily for 90 days. furosemide 20 mg tablet Commonly known as:  LASIX Take 1 Tab by mouth daily as needed. IRON (FERROUS SULFATE) PO Take 65 mg by mouth four (4) times daily. methotrexate 2.5 mg tablet Commonly known as:  Yoandy Lame Take 6 Tabs by mouth every Wednesday for 90 days. Start taking on:  3/21/2018  
  
 metoprolol tartrate 50 mg tablet Commonly known as:  LOPRESSOR  
two (2) times a day. multivitamin tablet Commonly known as:  ONE A DAY Take 1 Tab by mouth daily. predniSONE 5 mg tablet Commonly known as:  DELTASONE  
4 tabs daily for 7 days, 3 tabs for 7 days, 2 tabs for 7 days, 1 tab for 7 days  
  
 probenecid-colchicine 500-0.5 mg per tablet Commonly known as:  ColBenemid Take 1 Tab by mouth two (2) times a day for 30 days. TYLENOL ARTHRITIS PAIN 650 mg [place] [employer] Generic drug:  acetaminophen Take 650 mg by mouth as needed. XARELTO 20 mg Tab tablet Generic drug:  rivaroxaban Take 1 Tab by mouth daily (with breakfast). Prescriptions Sent to Pharmacy Refills  
 probenecid-colchicine (COLBENEMID) 500-0.5 mg per tablet 1 Sig: Take 1 Tab by mouth two (2) times a day for 30 days. Class: Normal  
 Pharmacy: [address], [address] Ph #: [phone] Route: Oral  
 methotrexate (RHEUMATREX) 2.5 mg tablet 0 Starting on: 3/21/2018 Sig: Take 6 Tabs by mouth every Wednesday for 90 days. Class: Normal  
 Pharmacy: [address], [address] Ph #: [phone] Route: Oral  
  
We Performed the Following C REACTIVE PROTEIN, QT [13675 CPT(R)] CBC WITH AUTOMATED DIFF [49271 CPT(R)] METABOLIC PANEL, COMPREHENSIVE [53444 CPT(R)] SED RATE (ESR) [id number] CPT(R)] Follow-up Instructions Return in about 4 weeks (around 4/12/2018). To-Do List   
 03/15/2018 Imaging:  XR CHEST PA LAT Introducing [facility]! Carmelita Fothergill introduces [employer] patient portal. Now you can access parts of your medical record, email your doctor's office, and request medication refills online. 1. In your internet browser, go to [email or web address] 2. Click on the First Time User? Click Here link in the Sign In box. You will see the New Member Sign Up page. 3. Enter your [employer] Access Code exactly as it appears below. You will not need to use this code after youve completed the sign-up process. If you do not sign up before the expiration date, you must request a new code. · [employer] Access Code: [email or web address] Expires: 4/8/2018 11:22 AM 
 
4. Enter the last four digits of your Social Security Number (xxxx) and Date of Birth (mm/dd/yyyy) as indicated and click Submit. You will be taken to the next sign-up page. 5. Create a [employer] ID.  This will be your MyChart login ID and cannot be changed, so think of one that is secure and easy to remember. 6. Create a AdventureDrop password. You can change your password at any time. 7. Enter your Password Reset Question and Answer. This can be used at a later time if you forget your password. 8. Enter your e-mail address. You will receive e-mail notification when new information is available in [address] 9. Click Sign Up. You can now view and download portions of your medical record. 10. Click the Download Summary menu link to download a portable copy of your medical information. If you have questions, please visit the Frequently Asked Questions section of the [employer] website. Remember, [employer] is NOT to be used for urgent needs. For medical emergencies, dial 911. Now available from your iPhone and Android! Please provide this summary of care documentation to your next provider. Your primary care clinician is listed as Daniel Donohue. If you have any questions after today's visit, please call [phone].

## 2018-03-15 NOTE — PROGRESS NOTES
REASON FOR VISIT    This is a follow up visit for Ms. Isrrael Shore for Seropositive Rheumatoid Arthritis. Inflammatory arthritis phenotype includes:  Anti-CCP positive: yes (21)  Rheumatoid factor positive: yes (94.8)  Erosive disease: yes  Extra-articular manifestations include: none    Immunosuppression Screening (7/31/2017):   Quantiferon TB: indeterminate  PPD: negative (2016)  PPD: negative (8/16/2017)  Hepatitis B: negative  Hepatitis C: negative    Therapy History includes:  Current DMARD therapy include: methotrexate 12.5 mg every Wednesday  Prior DMARD therapy include: gold (one year), Humira 40 mg every 14 days (10/31/2017)  Discontinued DMARDs because of inefficacy: gold  Discontinued DMARDs because of side effects: Humira (rash)    Bone Density Historical Synopsis    Height loss since age 29 (at least two inches): 0  Fracture history includes: no  Family history of hip fracture: no  Fall Risk: no    Daily calcium intake is 0 mg  Weekly vitamin D intake is 50,000 IU    Smoking history: no  Alcohol consumption: no  Prednisone history: yes    Exercise: no    Previous work-up for osteoporosis includes the following:  DEXA Scan: 8/28/2017  Vitamin 25OH D level: 24.7 (7/31/2017)  PTH: None  TSH: 1.38 (7/31/2017)    Therapy History includes:    Current osteoporosis therapy includes: none  Prior osteoporosis therapy includes: none  The following osteoporosis therapy have been ineffective: none  The following osteoporosis therapy were stopped because of side effects: none    Immunizations:   Immunization History   Administered Date(s) Administered    Influenza Vaccine 08/15/2017    Pneumococcal Polysaccharide (PPSV-23) 08/15/2017    TB Skin Test (PPD) Intradermal 08/14/2017    Zoster Vaccine, Live 08/15/2017       Active problems include:    Patient Active Problem List   Diagnosis Code    Chronic pulmonary embolism ([facility] [address] I27.82    Essential hypertension I10    DNR (do not resuscitate) Z66    Leg swelling M79.89    Seropositive rheumatoid arthritis of multiple sites ([facility] [address] M05.79    Long-term use of immunosuppressant medication Z79.899    Long term (current) use of systemic steroids Z79.52    Morbid obesity with BMI of 45.0-49.9, adult (HCC) E66.01, Z68.42    Fibromyalgia M79.7    Vitamin D deficiency E55.9    CKD (chronic kidney disease) stage 2, GFR 60-89 ml/min N18.2       HISTORY OF PRESENT ILLNESS    Ms. Sandrine Hartley returns for a follow up visit. On her last visit with my PA Aggie, she presented as an acute visit from Humira induced rash. She was switchinged to Enbrel and continue on methotrexate 12.5 mg every Wednesday. In 2/23/2018, she called complaining of pain in her elbow and hands without joint swelling. I prescribed prednisone taper which helped. She has been complaining of chest pain and spoke to the on call with the cardiologist and was told it was non-cardiac. She complains of pain in her chest that radiates to her back htat feels like \"someone is stepping on me\". The pain is worse when she moves. It hurts when she takes a breath. It hurts when she sits up or lays back. It does not hurt when she sits up in the chair but it hurts when she starts to stand up. This has been ongoing since 2/23/2018. She was instructed by her daughter to go the ED but she did not. Today, she denies pain, swelling or stiffness in her joints. She is on prednisone. Ms. Sandrine Hartley has continued her medications for arthritis (methotrexate, Enbrel) and reports good tolerance without significant side effects, except now reporting rash 2 days after last Humira injection. Last toxicity monitoring by blood work was done on 1/08/2018 and did not reveal any significant adverse effects, except creatinine 0.92 mg/dL (previously 1.05 mg/dL), eGFR 76 (previously 64). Most recent inflammatory markers from 1/08/2018 revealed a ESR 94 mm/hr (previously 93, 76 mm/hr) and CRP 19.7 mg/L (previously 26.3, 43.6 mg/L).     The patient has not had any interval hospital admissions, infections, or surgeries. REVIEW OF SYSTEMS    A comprehensive review of systems was performed and pertinent results are documented in the HPI, review of systems is otherwise non-contributory. PAST MEDICAL HISTORY    She has a past medical history of Anemia; Fibromyalgia; Hypertension; Osteoarthritis; Osteoporosis; Pulmonary embolism ([facility] [address] and Rheumatoid arthritis ([facility] [address] FAMILY HISTORY    Her family history includes Arthritis-rheumatoid in her mother; Cancer in her father; Diabetes in her maternal grandmother and son; Heart Attack in her paternal grandmother; Schizophrenia in her son. SOCIAL HISTORY    She reports that she has never smoked. She has never used smokeless tobacco. She reports that she does not drink alcohol or use illicit drugs. MEDICATIONS    Current Outpatient Prescriptions   Medication Sig Dispense Refill    acetaminophen (TYLENOL ARTHRITIS PAIN) 650 mg TbER Take 650 mg by mouth as needed.  probenecid-colchicine (COLBENEMID) 500-0.5 mg per tablet Take 1 Tab by mouth two (2) times a day for 30 days. 60 Tab 1    [START ON 3/21/2018] methotrexate (RHEUMATREX) 2.5 mg tablet Take 6 Tabs by mouth every Wednesday for 90 days. 72 Tab 0    predniSONE (DELTASONE) 5 mg tablet 4 tabs daily for 7 days, 3 tabs for 7 days, 2 tabs for 7 days, 1 tab for 7 days 70 Tab 0    ENBREL SURECLICK 50 mg/mL (3.75 mL) injection 0.98 mL by SubCUTAneous route every seven (7) days for 90 days. 4.74 mL 2    folic acid (FOLVITE) 1 mg tablet Take 1 Tab by mouth daily for 90 days. 90 Tab 0    ergocalciferol (ERGOCALCIFEROL) 50,000 unit capsule Take 1 Cap by mouth every Wednesday. 12 Cap 3    furosemide (LASIX) 20 mg tablet Take 1 Tab by mouth daily as needed. 90 Tab 1    IRON, FERROUS SULFATE, PO Take 65 mg by mouth four (4) times daily.  XARELTO 20 mg tab tablet Take 1 Tab by mouth daily (with breakfast).  90 Tab 0    multivitamin (ONE A DAY) tablet Take 1 Tab by mouth daily.  bisacodyl 5 mg tab Take  by mouth as needed.  metoprolol tartrate (LOPRESSOR) 50 mg tablet two (2) times a day.  famotidine (PEPCID) 20 mg tablet two (2) times a day. ALLERGIES    Allergies   Allergen Reactions    Clindamycin Swelling and Other (comments)     fever    Humira [Adalimumab] Hives       PHYSICAL EXAMINATION    Visit Vitals    /87    Pulse 98    Temp 98.4 °F (36.9 °C)    Resp 20    Ht 5' 6\" (1.676 m)    Wt 302 lb (137 kg)    BMI 48.74 kg/m2     Body mass index is 48.74 kg/(m^2). General: Patient is alert, oriented x 3, not in acute distress    HEENT:   Mouth is moist without lesions or erythema. No tongue edema. Sclerae are not injected and appear moist.  There is no alopecia. Neck is supple     Cardiovascular:  Heart is regular rate and rhythm, no murmurs. Chest:  Lungs are clear to auscultation bilaterally. Abdomen:  Obese. Extremities:  Free of clubbing, cyanosis, edema    Neurological exam:  Muscle strength is full in upper and lower extremities    Skin exam:  There are no rashes, no alopecia, no discoid lesions, no active Raynaud's, no livedo reticularis, no periungual erythema. Musculoskeletal exam:  A comprehensive musculoskeletal exam was performed for all joints of each upper and lower extremity and assessed for swelling, tenderness and range of motion.  Positive results are documented as below:    Costochondral tenderness    Decreased ROM of wrists without pain or swelling  Left elbow flexion deformity  Right 3rd PIP flexion deformity  Decreased ROM of ankles without pain or swelling     1/18 tender points (right mid-upper trapezius) - RESOLVED     Z-Deformities:   none  Cameron Neck Deformities:  none  Boutonierre's Deformities:  none  Ulnar Deviation:   none     Joint Count 3/15/2018 1/8/2018 10/9/2017 8/14/2017 7/31/2017   Patient pain (0-100) 6 10 0 0 -   MHAQ 0 0 0 0.125 0.125   Left wrist- Tender - 1 - 1 -   Left wrist- Swollen - - - 1 -   Right wrist- Tender - - - 1 -   Right wrist- Swollen - - - 1 -   Right 2nd MCP - Swollen - 1 1 - -   Right 3rd MCP - Swollen - - 1 - -   Right 2nd PIP - Swollen - 1 1 - -   Right 3rd PIP - Swollen - - 1 - -   Tender Joint Count (Total) - 1 - 2 -   Swollen Joint Count (Total) - 2 4 2 -   Physician Assessment (0-10) - 1 - 2 -   Patient Assessment (0-10) 10 1 0 0 -   CDAI Total (calculated) - 5 - 6 -       DATA REVIEW    Laboratory     Recent laboratory results were reviewed, summarized, and discussed with the patient. Imaging    Musculoskeletal Ultrasound    None    Radiographs    Bilateral Hand 7/31/2017: RIGHT: no fracture. There is diffuse osteopenia. Both corticated and non corticated erosive changes are present involving the radiocarpal joint, carpal joints, and metacarpal carpal joints. In addition, there is involvement of the first and second metacarpal phalangeal joints, most pronounced in the first. The lunate and radius appear ankylosed. LEFT:  no fracture. There is diffuse osteopenia. Both corticated and non corticated erosive changes are present involving the radiocarpal joint, carpal joints, and metacarpal carpal joints. The lunate and radius appear ankylosed. Some erosive changes are also present in the third PIP joint. The scaphoid lunate distance is widened. Left Elbow 7/31/2017: marked flattening of the radial head with sclerosis. In erosive changes also present of the proximal ulna. A moderate joint effusion is seen. Use osteopenia is noted. Bilateral Foot 7/31/2017: RIGHT: diffuse osteopenia. Non corticated erosions are present involving the second, fourth, and fifth MTP joints. Associated soft tissue swelling is present. The there may be an ankylosis of the fourth and third metatarsals to the midfoot. Flattening of the second metatarsal head is noted. There is a small Achilles and moderate the plantar spur. No fracture is seen.  LEFT: diffuse osteopenia. Erosive changes are present at the second and third metatarsal tarsal joints. A small spur is noted at the Achilles insertion. No fractures identified. Less forefoot soft tissue swelling is present. No fracture is seen    Chest 1/30/2017: lungs remain clear. No pneumothorax or pleural effusion apparent. Cardiac silhouette remains large. Endotracheal tube and nasogastric tube have been removed. Left central line stable in position with tip projecting over the superior vena cava. Right Hip 4/26/2016: no fracture or dislocation. The right hip joint spaces normal and symmetric with the left side. Enthesophytes are seen along either iliac crest and there is relatively severe bilateral facet hypertrophy at L5-S1. The sacroiliac joints appear grossly normal.     Right Femur 4/26/2016: the patient is status post prior placement of a 3 component right total knee prosthesis. From this examination a full assessment of the prosthesis is limited. Grossly this examination is negative for a loosening of the prosthetic components, heterotopic ossification, or additional complications of the prosthesis. The joint spaces of the right hip are well maintained without significant osteoarthritis. This examination is negative for a fracture or an insufficiency fracture of the proximal femur. This examination examination is negative for focal lytic or blastic lesions of the right femur. CT Imaging    CT abdomen and pelvis without contrast 7/31/2017:there are linear densities at both lung bases suggesting scarring or subsegmental atelectasis. The lung bases are otherwise clear no pleural effusion is seen. The liver spleen and pancreas are unremarkable. No renal stone or mass is seen. No ureteral dilatation or stone is seen. The urinary bladder is unremarkable. A urinary catheter is noted with tip within the urinary bladder. Small calcifications within the uterus are again noted.  The uterus and pelvic adnexa are otherwise unremarkable. No dilated bowel or free air or free fluid is seen. Specifically there is no evidence of retroperitoneal or intra-abdominal hemorrhage. An inferior vena cava filter is noted. MR Imaging    MRI Lumbar Spine with and without contrast 4/26/2016: study is suboptimal secondary to patient's body habitus. T12-L1: Normal for age. L1-L2: Normal for age. L2-L3: There is mild facet joint arthropathy. L3-L4: There is mild disc disease with concentric bulge. Moderate to severe facet arthropathy is present with bony hypertrophy and ligamentous thickening. There is moderate central canal stenosis and lateral recess narrowing. Bilateral foraminal narrowing is present. There is absence of fat within the right neural foramen suggesting involvement of the exiting L3 nerve root. L4-L5: There is mild disc disease with loss of disc height. Moderate to severe facet arthropathy is present with bony hypertrophy and ligamentous thickening. There is moderate central canal stenosis primarily in a transverse direction. Severe lateral recess narrowing is present with moderate bilateral foraminal narrowing. Absence of fat within the right neural foramen suggests involvement of the exiting right L4 nerve root. L5-S1: There is mild disc degeneration with loss of disc height. Moderate to severe facet arthropathy is present with bony hypertrophy and ligamentous thickening. No significant canal stenosis. There is mild lateral recess narrowing. Bilateral neural foraminal narrowing is also present, right greater than left. Absence of fat within the right neural foramen suggests involvement of the exiting right L5 nerve root. Visualized portions of the sacrum are normal. There is no abnormal enhancement. The conus is normal in contour and signal characteristics. Paraspinal soft tissues are unremarkable.      DXA     DXA 8/28/2017: (excluded None) showed lumbar spine L1-L4 T score 1.2 (BMD 1.345 g/cm2), left femoral neck T score -0.9 (0.919 g/cm2), left total hip T score: 0.1 (1.020 g/cm2), right femoral neck T score: -0.4 (0.977 g/cm2), right total hip T score: -0.6 (0.938 g/cm2), and distal one third left radius T score N/A (BMD N/A g/cm2). FRAX score 5.8 % probability in 10 years for major osteoporotic fracture and 0.4 % 10 year probability of hip fracture. ASSESSMENT AND PLAN    This is a follow up visit for Ms. Floyce Boeck. 1) Seropositive Erosive Rheumatoid Arthritis. She is maintained on methotrexate 12.5 mg every Wednesday and Enbrel weekly with good tolerance and feels well. She recently contacted us due to a flare so was prescribed a prednisone taper. Her CDAI was 3 (previously 5) with 0 tender and 0 swollen joints, consistent with low disease activity. I will increase her methotrexate to 15 mg weekly, since she is on a prednisone taper. She has chronic kidney disease stage 2. Labs today. 2) Rash. This was secondary to Humira and has resolved after cessation and not recurred with Enbrel. 3) Long Term Use of Immunosuppressants. The patient remains on immunomodulatory medications (methotrexate, Enbrel) and requires frequent toxicity monitoring by blood work. Respective labs were done last week (CBC and CMP).    4) Long Term Use of Systemic Steroids (Prednisone). She has been on chronic daily prednisone for years. She tapered from prednisone 10 mg daily to 5 mg daily October 2017. She is currently on a taper.     5) Fibromyalgia. Her history, constellation of symptoms, and examination are consistent with a pain syndrome, possiblity secondary to Rheumatoid Arthritis.    6) Morbid Obesity. Her BMI was 48.74 (previously 48.10, 47.78, 48.97, 48.74). Weight loss is recommended. 7) Vitamin D Deficiency. Her vitamin D level was 41.1 (previously 24.7). She is on weekly ergocalciferol 50,000. 8) CKD Stage 2. Her creatinine was 0.92 mg/dL, eGFR 76.     9) Right Sided Carpal Tunnel Syndrome.  This was not an active issue today. 10) Costochondritis. This is new onset. Prednisone and Tylenol do not help. She cannot take NSAIDs due to Xarelto. I will try her on colchicine for 4 weeks. I ordered a chest radiograph. She was evaluate by telephone encounter with cardiology who did not feel this was cardiac in nature. The patient voiced understanding of the aforementioned assessment and plan. Summary of plan was provided in the After Visit Summary patient instructions.      TODAY'S ORDERS    Orders Placed This Encounter    XR CHEST PA LAT    CBC WITH AUTOMATED DIFF    METABOLIC PANEL, COMPREHENSIVE    C REACTIVE PROTEIN, QT    SED RATE (ESR)    probenecid-colchicine (COLBENEMID) 500-0.5 mg per tablet    methotrexate (RHEUMATREX) 2.5 mg tablet     Future Appointments  Date Time Provider [facility]   4/9/2018 2:20 PM Flor Barclay MD [facility]   4/16/2018 4:20 PM MD Vita Wang MD, [address]    Adult Rheumatology   Musculoskeletal Ultrasound Certified  [address]   Phone [phone]  Fax [phone]

## 2018-03-16 LAB
ALBUMIN SERPL-MCNC: 4 G/DL (ref 3.6–4.8)
ALBUMIN/GLOB SERPL: 1 {RATIO} (ref 1.2–2.2)
ALP SERPL-CCNC: 87 IU/L (ref 39–117)
ALT SERPL-CCNC: 11 IU/L (ref 0–32)
AST SERPL-CCNC: 20 IU/L (ref 0–40)
BASOPHILS # BLD AUTO: 0 X10E3/UL (ref 0–0.2)
BASOPHILS NFR BLD AUTO: 1 %
BILIRUB SERPL-MCNC: 0.4 MG/DL (ref 0–1.2)
BUN SERPL-MCNC: 12 MG/DL (ref 8–27)
BUN/CREAT SERPL: 15 (ref 12–28)
CALCIUM SERPL-MCNC: 9.6 MG/DL (ref 8.7–10.3)
CHLORIDE SERPL-SCNC: 102 MMOL/L (ref 96–106)
CO2 SERPL-SCNC: 20 MMOL/L (ref 18–29)
CREAT SERPL-MCNC: 0.82 MG/DL (ref 0.57–1)
CRP SERPL-MCNC: 26.8 MG/L (ref 0–4.9)
EOSINOPHIL # BLD AUTO: 0 X10E3/UL (ref 0–0.4)
EOSINOPHIL NFR BLD AUTO: 0 %
ERYTHROCYTE [DISTWIDTH] IN BLOOD BY AUTOMATED COUNT: 16.8 % (ref 12.3–15.4)
ERYTHROCYTE [SEDIMENTATION RATE] IN BLOOD BY WESTERGREN METHOD: 94 MM/HR (ref 0–40)
GFR SERPLBLD CREATININE-BSD FMLA CKD-EPI: 75 ML/MIN/1.73
GFR SERPLBLD CREATININE-BSD FMLA CKD-EPI: 87 ML/MIN/1.73
GLOBULIN SER CALC-MCNC: 4 G/DL (ref 1.5–4.5)
GLUCOSE SERPL-MCNC: 96 MG/DL (ref 65–99)
HCT VFR BLD AUTO: 33.5 % (ref 34–46.6)
HGB BLD-MCNC: 11 G/DL (ref 11.1–15.9)
IMM GRANULOCYTES # BLD: 0 X10E3/UL (ref 0–0.1)
IMM GRANULOCYTES NFR BLD: 0 %
LYMPHOCYTES # BLD AUTO: 1 X10E3/UL (ref 0.7–3.1)
LYMPHOCYTES NFR BLD AUTO: 19 %
MCH RBC QN AUTO: 32.2 PG (ref 26.6–33)
MCHC RBC AUTO-ENTMCNC: 32.8 G/DL (ref 31.5–35.7)
MCV RBC AUTO: 98 FL (ref 79–97)
MONOCYTES # BLD AUTO: 0.2 X10E3/UL (ref 0.1–0.9)
MONOCYTES NFR BLD AUTO: 4 %
NEUTROPHILS # BLD AUTO: 3.8 X10E3/UL (ref 1.4–7)
NEUTROPHILS NFR BLD AUTO: 76 %
PLATELET # BLD AUTO: 311 X10E3/UL (ref 150–379)
POTASSIUM SERPL-SCNC: 5 MMOL/L (ref 3.5–5.2)
PROT SERPL-MCNC: 8 G/DL (ref 6–8.5)
RBC # BLD AUTO: 3.42 X10E6/UL (ref 3.77–5.28)
SODIUM SERPL-SCNC: 141 MMOL/L (ref 134–144)
WBC # BLD AUTO: 4.9 X10E3/UL (ref 3.4–10.8)

## 2018-03-19 NOTE — PROGRESS NOTES
The results were reviewed and a letter was sent.  Mild anemia. elevated inflammatory markers (ESR, CRP), consistent with active Rheumatoid Arthritis

## 2018-04-16 ENCOUNTER — OFFICE VISIT (OUTPATIENT)
Dept: RHEUMATOLOGY | Age: 68
End: 2018-04-16

## 2018-04-16 VITALS
WEIGHT: 293 LBS | SYSTOLIC BLOOD PRESSURE: 140 MMHG | DIASTOLIC BLOOD PRESSURE: 69 MMHG | RESPIRATION RATE: 18 BRPM | BODY MASS INDEX: 47.09 KG/M2 | TEMPERATURE: 97.7 F | HEART RATE: 84 BPM | HEIGHT: 66 IN

## 2018-04-16 DIAGNOSIS — M05.79 SEROPOSITIVE RHEUMATOID ARTHRITIS OF MULTIPLE SITES (HCC): Primary | ICD-10-CM

## 2018-04-16 DIAGNOSIS — E66.01 MORBID OBESITY WITH BMI OF 45.0-49.9, ADULT (HCC): ICD-10-CM

## 2018-04-16 DIAGNOSIS — Z79.60 LONG-TERM USE OF IMMUNOSUPPRESSANT MEDICATION: ICD-10-CM

## 2018-04-16 DIAGNOSIS — M79.7 FIBROMYALGIA: ICD-10-CM

## 2018-04-16 DIAGNOSIS — E55.9 VITAMIN D DEFICIENCY: ICD-10-CM

## 2018-04-16 DIAGNOSIS — N18.2 CKD (CHRONIC KIDNEY DISEASE) STAGE 2, GFR 60-89 ML/MIN: ICD-10-CM

## 2018-04-16 RX ORDER — METHOTREXATE 2.5 MG/1
20 TABLET ORAL
Qty: 96 TAB | Refills: 0 | Status: SHIPPED | OUTPATIENT
Start: 2018-04-18 | End: 2018-06-20 | Stop reason: SDUPTHER

## 2018-04-16 RX ORDER — PENICILLIN V POTASSIUM 500 MG/1
500 TABLET, FILM COATED ORAL 2 TIMES DAILY
COMMUNITY
End: 2018-06-18

## 2018-04-16 NOTE — PATIENT INSTRUCTIONS
PLEASE INCREASE YOUR METHOTREXATE    1) THIS WEDNESDAY TO 7 tablets (17.5 mg)  2) AND THEN NEXT WEDNESDAY 8 tablets (20 mg)   3) AND THEN CONTINUE 8 TABLETS EVERY WEDNESDAY    CONTINUE with daily Folic Acid 1 mg    CONTACT ME IF YOU HAVE ANY SIDE EFFECTS OR HAVE AN INFECTION    LABS TODAY    FOLLOW UP IN 3 MONTHS

## 2018-04-16 NOTE — PROGRESS NOTES
REASON FOR VISIT    This is a follow up visit for Ms. Cyrus Boudreaux for Seropositive Rheumatoid Arthritis. Inflammatory arthritis phenotype includes:  Anti-CCP positive: yes (21)  Rheumatoid factor positive: yes (94.8)  Erosive disease: yes  Extra-articular manifestations include: none    Immunosuppression Screening (7/31/2017):   Quantiferon TB: indeterminate  PPD: negative (2016)  PPD: negative (8/16/2017)  Hepatitis B: negative  Hepatitis C: negative    Therapy History includes:  Current DMARD therapy include: methotrexate 15 mg every Wednesday, Enbrel every Monday (2/2018)  Prior DMARD therapy include: gold (one year), Humira 40 mg every 14 days (10/31/2017)  Discontinued DMARDs because of inefficacy: gold  Discontinued DMARDs because of side effects: Humira (rash)    Bone Density Historical Synopsis    Height loss since age 29 (at least two inches): 0  Fracture history includes: no  Family history of hip fracture: no  Fall Risk: no    Daily calcium intake is 0 mg  Weekly vitamin D intake is 50,000 IU    Smoking history: no  Alcohol consumption: no  Prednisone history: yes    Exercise: no    Previous work-up for osteoporosis includes the following:  DEXA Scan: 8/28/2017  Vitamin 25OH D level: 24.7 (7/31/2017)  PTH: None  TSH: 1.38 (7/31/2017)    Therapy History includes:    Current osteoporosis therapy includes: none  Prior osteoporosis therapy includes: none  The following osteoporosis therapy have been ineffective: none  The following osteoporosis therapy were stopped because of side effects: none    Immunizations:   Immunization History   Administered Date(s) Administered    Influenza Vaccine 08/15/2017    Pneumococcal Polysaccharide (PPSV-23) 08/15/2017    TB Skin Test (PPD) Intradermal 08/14/2017    Zoster Vaccine, Live 08/15/2017       Active problems include:    Patient Active Problem List   Diagnosis Code    Chronic pulmonary embolism ([address] I27.82    Essential hypertension I10    DNR (do not resuscitate) Z66    Leg swelling M79.89    Seropositive rheumatoid arthritis of multiple sites ([facility] M05.79    Long-term use of immunosuppressant medication Z79.899    Long term (current) use of systemic steroids Z79.52    Morbid obesity with BMI of 45.0-49.9, adult (HCC) E66.01, Z68.42    Fibromyalgia M79.7    Vitamin D deficiency E55.9    CKD (chronic kidney disease) stage 2, GFR 60-89 ml/min N18.2       HISTORY OF PRESENT ILLNESS    Ms. Wilner Foster returns for a follow up visit. On her last visit, I prescribed probenecid-colchicine for the colchicine trial effect for chest pain (costochondritis) and increased methotrexate to 15 mg weekly. Her chest pain feels better with the colchicine she is taking twice daily. Today, she complains of pain and swelling in her hands (PIP). She does not feel that methotrexate or Enbrel is not helping. She has been on antibiotics for a tooth abscess for the past week. Ms. Wilner Foster has continued her medications for arthritis (methotrexate, Enbrel) and reports good tolerance without significant side effects, except now reporting rash 2 days after last Humira injection. Last toxicity monitoring by blood work was done on 3/15/2018 and did not reveal any significant adverse effects, except Hct 33.5%, eGFR 87 (previously 76, 64). Most recent inflammatory markers from 3/15/2018 revealed a ESR 94 mm/hr (previously 94, 93, 76 mm/hr) and CRP 26.8 mg/L (previously 19.7, 26.3, 43.6 mg/L). The patient has not had any interval hospital admissions, infections, or surgeries. REVIEW OF SYSTEMS    A comprehensive review of systems was performed and pertinent results are documented in the HPI, review of systems is otherwise non-contributory. PAST MEDICAL HISTORY    She has a past medical history of Anemia; Fibromyalgia; Hypertension; Osteoarthritis; Osteoporosis; Pulmonary embolism ([facility] [address] [facility] and Rheumatoid arthritis ([facility] [address] [facility]     FAMILY HISTORY    Her family history includes Arthritis-rheumatoid in her mother; Cancer in her father; Diabetes in her maternal grandmother and son; Heart Attack in her paternal grandmother; Schizophrenia in her son. SOCIAL HISTORY    She reports that she has never smoked. She has never used smokeless tobacco. She reports that she does not drink alcohol or use illicit drugs. MEDICATIONS    Current Outpatient Prescriptions   Medication Sig Dispense Refill    penicillin v potassium (VEETID) 500 mg tablet Take 500 mg by mouth two (2) times a day.  etanercept (ENBREL SURECLICK) 50 mg/mL (8.50 mL) injection 0.98 mL by SubCUTAneous route every seven (7) days for 90 days. 11.76 mL 0    [START ON 4/18/2018] methotrexate (RHEUMATREX) 2.5 mg tablet Take 8 Tabs by mouth every Wednesday for 90 days. 96 Tab 0    acetaminophen (TYLENOL ARTHRITIS PAIN) 650 mg TbER Take 650 mg by mouth as needed.  ergocalciferol (ERGOCALCIFEROL) 50,000 unit capsule Take 1 Cap by mouth every Wednesday. 12 Cap 3    furosemide (LASIX) 20 mg tablet Take 1 Tab by mouth daily as needed. 90 Tab 1    IRON, FERROUS SULFATE, PO Take 65 mg by mouth four (4) times daily.  XARELTO 20 mg tab tablet Take 1 Tab by mouth daily (with breakfast). 90 Tab 0    multivitamin (ONE A DAY) tablet Take 1 Tab by mouth daily.  metoprolol tartrate (LOPRESSOR) 50 mg tablet two (2) times a day.  famotidine (PEPCID) 20 mg tablet two (2) times a day. ALLERGIES    Allergies   Allergen Reactions    Clindamycin Swelling and Other (comments)     fever    Humira [Adalimumab] Hives       PHYSICAL EXAMINATION    Visit Vitals    /69    Pulse 84    Temp 97.7 °F (36.5 °C)    Resp 18    Ht 5' 6\" (1.676 m)    Wt 297 lb (134.7 kg)    BMI 47.94 kg/m2     Body mass index is 47.94 kg/(m^2). General: Patient is alert, oriented x 3, not in acute distress, daughter at bedside    HEENT:   Mouth is moist without lesions or erythema. No tongue edema.    Sclerae are not injected and appear moist.  There is no alopecia. Neck is supple     Cardiovascular:  Heart is regular rate and rhythm, no murmurs. Chest:  Lungs are clear to auscultation bilaterally. Abdomen:  Obese. Extremities:  Free of clubbing, cyanosis, edema    Neurological exam:  Muscle strength is full in upper and lower extremities    Skin exam:  There are no rashes, no alopecia, no discoid lesions, no active Raynaud's, no livedo reticularis, no periungual erythema. Musculoskeletal exam:  A comprehensive musculoskeletal exam was performed for all joints of each upper and lower extremity and assessed for swelling, tenderness and range of motion.  Positive results are documented as below:    Decreased ROM of wrists    Left elbow flexion deformity  Right 3rd PIP flexion deformity  Decreased ROM of ankles without pain or swelling     Z-Deformities:   none  D[place] Neck Deformities:  none  Boutonierre's Deformities:  none  Ulnar Deviation:   none     Joint Count 4/16/2018 3/15/2018 1/8/2018 10/9/2017 8/14/2017 7/31/2017   Patient pain (0-100) 25 3 10 0 0 -   MHAQ 0.125 0 0 0 0.125 0.125   Left wrist- Tender 1 0 1 - 1 -   Left wrist- Swollen - 0 - - 1 -   Left thumb IP - Tender 1 - - - - -   Left 2nd PIP - Swollen 1 - - - - -   Left 3rd PIP - Tender 1 - - - - -   Left 3rd PIP - Swollen 1 - - - - -   Left 4th PIP - Tender 1 - - - - -   Left 4th PIP - Swollen 1 - - - - -   Left 5th PIP - Tender 1 - - - - -   Left 5th PIP - Swollen 1 - - - - -   Right wrist- Tender - - - - 1 -   Right wrist- Swollen 1 - - - 1 -   Right 2nd MCP - Swollen - - 1 1 - -   Right 3rd MCP - Swollen - - - 1 - -   Right 2nd PIP - Swollen 1 - 1 1 - -   Right 3rd PIP - Swollen - - - 1 - -   Tender Joint Count (Total) 5 0 1 - 2 -   Swollen Joint Count (Total) 6 0 2 4 2 -   Physician Assessment (0-10) 3 0 1 - 2 -   Patient Assessment (0-10) 3 3 1 0 0 -   CDAI Total (calculated) 17 3 5 - 6 -       DATA REVIEW    Laboratory     Recent laboratory results were reviewed, summarized, and discussed with the patient. Imaging    Musculoskeletal Ultrasound    None    Radiographs    Chest 3/15/2018: clear lungs. The cardiac and mediastinal contours and pulmonary vascularity are normal.  The bones and soft tissues are within normal limits. Bilateral Hand 7/31/2017: RIGHT: no fracture. There is diffuse osteopenia. Both corticated and non corticated erosive changes are present involving the radiocarpal joint, carpal joints, and metacarpal carpal joints. In addition, there is involvement of the first and second metacarpal phalangeal joints, most pronounced in the first. The lunate and radius appear ankylosed. LEFT:  no fracture. There is diffuse osteopenia. Both corticated and non corticated erosive changes are present involving the radiocarpal joint, carpal joints, and metacarpal carpal joints. The lunate and radius appear ankylosed. Some erosive changes are also present in the third PIP joint. The scaphoid lunate distance is widened. Left Elbow 7/31/2017: marked flattening of the radial head with sclerosis. In erosive changes also present of the proximal ulna. A moderate joint effusion is seen. Use osteopenia is noted. Bilateral Foot 7/31/2017: RIGHT: diffuse osteopenia. Non corticated erosions are present involving the second, fourth, and fifth MTP joints. Associated soft tissue swelling is present. The there may be an ankylosis of the fourth and third metatarsals to the midfoot. Flattening of the second metatarsal head is noted. There is a small Achilles and moderate the plantar spur. No fracture is seen. LEFT: diffuse osteopenia. Erosive changes are present at the second and third metatarsal tarsal joints. A small spur is noted at the Achilles insertion. No fractures identified. Less forefoot soft tissue swelling is present. No fracture is seen    Chest 1/30/2017: lungs remain clear. No pneumothorax or pleural effusion apparent.  Cardiac silhouette remains large. Endotracheal tube and nasogastric tube have been removed. Left central line stable in position with tip projecting over the superior vena cava. Right Hip 4/26/2016: no fracture or dislocation. The right hip joint spaces normal and symmetric with the left side. Enthesophytes are seen along either iliac crest and there is relatively severe bilateral facet hypertrophy at L5-S1. The sacroiliac joints appear grossly normal.     Right Femur 4/26/2016: the patient is status post prior placement of a 3 component right total knee prosthesis. From this examination a full assessment of the prosthesis is limited. Grossly this examination is negative for a loosening of the prosthetic components, heterotopic ossification, or additional complications of the prosthesis. The joint spaces of the right hip are well maintained without significant osteoarthritis. This examination is negative for a fracture or an insufficiency fracture of the proximal femur. This examination examination is negative for focal lytic or blastic lesions of the right femur. CT Imaging    CT Abdomen and Pelvis without contrast 7/31/2017:there are linear densities at both lung bases suggesting scarring or subsegmental atelectasis. The lung bases are otherwise clear no pleural effusion is seen. The liver spleen and pancreas are unremarkable. No renal stone or mass is seen. No ureteral dilatation or stone is seen. The urinary bladder is unremarkable. A urinary catheter is noted with tip within the urinary bladder. Small calcifications within the uterus are again noted. The uterus and pelvic adnexa are otherwise unremarkable. No dilated bowel or free air or free fluid is seen. Specifically there is no evidence of retroperitoneal or intra-abdominal hemorrhage. An inferior vena cava filter is noted. MR Imaging    MRI Lumbar Spine with and without contrast 4/26/2016: study is suboptimal secondary to patient's body habitus. T12-L1: Normal for age. L1-L2: Normal for age. L2-L3: There is mild facet joint arthropathy. L3-L4: There is mild disc disease with concentric bulge. Moderate to severe facet arthropathy is present with bony hypertrophy and ligamentous thickening. There is moderate central canal stenosis and lateral recess narrowing. Bilateral foraminal narrowing is present. There is absence of fat within the right neural foramen suggesting involvement of the exiting L3 nerve root. L4-L5: There is mild disc disease with loss of disc height. Moderate to severe facet arthropathy is present with bony hypertrophy and ligamentous thickening. There is moderate central canal stenosis primarily in a transverse direction. Severe lateral recess narrowing is present with moderate bilateral foraminal narrowing. Absence of fat within the right neural foramen suggests involvement of the exiting right L4 nerve root. L5-S1: There is mild disc degeneration with loss of disc height. Moderate to severe facet arthropathy is present with bony hypertrophy and ligamentous thickening. No significant canal stenosis. There is mild lateral recess narrowing. Bilateral neural foraminal narrowing is also present, right greater than left. Absence of fat within the right neural foramen suggests involvement of the exiting right L5 nerve root. Visualized portions of the sacrum are normal. There is no abnormal enhancement. The conus is normal in contour and signal characteristics. Paraspinal soft tissues are unremarkable. DXA     DXA 8/28/2017: (excluded None) showed lumbar spine L1-L4 T score 1.2 (BMD 1.345 g/cm2), left femoral neck T score -0.9 (0.919 g/cm2), left total hip T score: 0.1 (1.020 g/cm2), right femoral neck T score: -0.4 (0.977 g/cm2), right total hip T score: -0.6 (0.938 g/cm2), and distal one third left radius T score N/A (BMD N/A g/cm2).  FRAX score 5.8 % probability in 10 years for major osteoporotic fracture and 0.4 % 10 year probability of hip fracture. ASSESSMENT AND PLAN    This is a follow up visit for Ms. Nii Katz. 1) Seropositive Erosive Rheumatoid Arthritis. She is maintained on methotrexate 15 mg every Wednesday and Enbrel weekly with good tolerance but continues to complain of active disease. In fact, she appears a little worse. Her CDAI was 17 (previously 3, 5) with 5 tender and 6 swollen joints, consistent with moderate disease activity. I asked her to increase her methotrexate incrementally over the next two doses to 20 mg weekly. I asked her to continue Enbrel and I will reassess in her two months. She is on penicillin for a tooth abscess, so I asked her to skip her medications until the abscess has cleared. 2) Long Term Use of Immunosuppressants. The patient remains on immunomodulatory medications (methotrexate, Enbrel) and requires frequent toxicity monitoring by blood work. Respective labs were done last week (CBC and CMP). 3) Long Term Use of Systemic Steroids (Prednisone). She has been on chronic daily prednisone for years. She tapered from prednisone 10 mg daily to 5 mg daily October 2017. She is off prednisone.     4) Costochondritis. This was new onset. Prednisone and Tylenol do not help. She cannot take NSAIDs due to Xarelto. Colchicine helped.      5) Fibromyalgia. Her history, constellation of symptoms, and examination are consistent with a pain syndrome, possiblity secondary to Rheumatoid Arthritis.    6) Morbid Obesity. Her BMI was 47.94 (previously 48.74, 48.10, 47.78, 48.97, 48.74). Weight loss is recommended. 7) Vitamin D Deficiency. Her vitamin D level was 41.1 (previously 24.7). She is on weekly ergocalciferol 50,000. 8) CKD Stage 2. Her creatinine was 0.82 mg/dL, eGFR 87 (previously 76). 9) Right Sided Carpal Tunnel Syndrome. This was not an active issue today. The patient voiced understanding of the aforementioned assessment and plan.  Summary of plan was provided in the After Visit Summary patient instructions. TODAY'S ORDERS    Orders Placed This Encounter    etanercept (ENBREL SURECLICK) 50 mg/mL (8.38 mL) injection    methotrexate (RHEUMATREX) 2.5 mg tablet     Future Appointments  Follow-up Disposition:  Return in about 3 months (around 7/16/2018).     Laura Ramirez MD, [address] [facility]    Adult Rheumatology   Musculoskeletal Ultrasound Certified  [address], [facility], [address]   Phone [phone]  Fax [phone]

## 2018-04-16 NOTE — MR AVS SNAPSHOT
[address] [facility] [address] [place] [address] 
[phone] Patient: Elijah Shepard MRN: [id number] Visit Information Date & Time Provider Department Dept. Phone Encounter #  
 4/16/2018  4:20 PM Pedro Luis Rivas MD [address] [facility] [id number] Follow-up Instructions Return in about 3 months (around 7/16/2018). Upcoming Health Maintenance Date Due DTaP/Tdap/Td series (1 - Tdap) 10/2/1973 GLAUCOMA SCREENING Q2Y 10/2/2017 BREAST CANCER SCRN MAMMOGRAM 7/31/2018 MEDICARE YEARLY EXAM 8/1/2018 Pneumococcal 65+ Low/Medium Risk (1 of 2 - PCV13) 8/15/2018 COLONOSCOPY 7/31/2027 Allergies as of 4/16/2018  Review Complete On: 4/16/2018 By: Rosa M Mathew RN Severity Noted Reaction Type Reactions Clindamycin  12/18/2017    Swelling, Other (comments) fever Humira [Adalimumab]  01/15/2018    Hives Current Immunizations  Reviewed on 1/29/2018 Name Date Influenza Vaccine 8/15/2017 Pneumococcal Polysaccharide (PPSV-23) 8/15/2017 TB Skin Test (PPD) Intradermal 8/14/2017 Zoster Vaccine, Live 8/15/2017 Not reviewed this visit You Were Diagnosed With   
  
 Codes Comments Seropositive rheumatoid arthritis of multiple sites [facility])     ICD-10-CM: M05.79 ICD-9-CM: 714.0 Vitals BP Pulse Temp Resp Height(growth percentile) Weight(growth percentile) 140/69 84 97.7 °F (36.5 °C) 18 5' 6\" (1.676 m) 297 lb (134.7 kg) BMI OB Status Smoking Status 47.94 kg/m2 Postmenopausal Never Smoker BMI and BSA Data Body Mass Index Body Surface Area  
 47.94 kg/m 2 2.5 m 2 Preferred Pharmacy Pharmacy Name Phone [facility] [address] [facility], [address] [place] [address] [place] [phone] Your Updated Medication List  
  
   
This list is accurate as of 4/16/18  4:37 PM.  Always use your most recent med list.  
  
  
  
  
 ergocalciferol 50,000 unit capsule Commonly known as:  ERGOCALCIFEROL Take 1 Cap by mouth every Wednesday. etanercept 50 mg/mL (0.98 mL) injection Commonly known as:  ENBREL SURECLICK  
0.91 mL by SubCUTAneous route every seven (7) days for 90 days. famotidine 20 mg tablet Commonly known as:  PEPCID  
two (2) times a day. furosemide 20 mg tablet Commonly known as:  LASIX Take 1 Tab by mouth daily as needed. IRON (FERROUS SULFATE) PO Take 65 mg by mouth four (4) times daily. methotrexate 2.5 mg tablet Commonly known as:  Shefali Finner Take 8 Tabs by mouth every Wednesday for 90 days. Start taking on:  2018  
  
 metoprolol tartrate 50 mg tablet Commonly known as:  LOPRESSOR  
two (2) times a day. multivitamin tablet Commonly known as:  ONE A DAY Take 1 Tab by mouth daily. penicillin v potassium 500 mg tablet Commonly known as:  VEETID Take 500 mg by mouth two (2) times a day. TYLENOL ARTHRITIS PAIN 650 mg Trever Boots Generic drug:  acetaminophen Take 650 mg by mouth as needed. XARELTO 20 mg Tab tablet Generic drug:  rivaroxaban Take 1 Tab by mouth daily (with breakfast). Prescriptions Sent to Pharmacy Refills  
 etanercept (ENBREL SURECLICK) 50 mg/mL (2.77 mL) injection 0 Si.98 mL by SubCUTAneous route every seven (7) days for 90 days. Class: Normal  
 Pharmacy: [address] Ph #: [phone] Route: SubCUTAneous  
 methotrexate (RHEUMATREX) 2.5 mg tablet 0 Starting on: 2018 Sig: Take 8 Tabs by mouth every Wednesday for 90 days. Class: Normal  
 Pharmacy: [address] - [place], [address] [address] Ph #: [phone] Route: Oral  
  
Follow-up Instructions Return in about 3 months (around 2018). Patient Instructions PLEASE INCREASE YOUR METHOTREXATE 1) THIS WEDNESDAY TO 7 tablets (17.5 mg) 2) AND THEN NEXT WEDNESDAY 8 tablets (20 mg) 3) AND THEN CONTINUE 8 TABLETS EVERY WEDNESDAY CONTINUE with daily Folic Acid 1 mg CONTACT ME IF YOU HAVE ANY SIDE EFFECTS OR HAVE AN INFECTION 
 
LABS TODAY FOLLOW UP IN 3 MONTHS Introducing [facility] SERVICES! Suzy So introduces [employer] patient portal. Now you can access parts of your medical record, email your doctor's office, and request medication refills online. 1. In your internet browser, go to [email or web address] 2. Click on the First Time User? Click Here link in the Sign In box. You will see the New Member Sign Up page. 3. Enter your [employer] Access Code exactly as it appears below. You will not need to use this code after youve completed the sign-up process. If you do not sign up before the expiration date, you must request a new code. · [employer] Access Code: [email or web address] Expires: 7/15/2018  4:35 PM 
 
4. Enter the last four digits of your Social Security Number (xxxx) and Date of Birth (mm/dd/yyyy) as indicated and click Submit. You will be taken to the next sign-up page. 5. Create a [employer] ID. This will be your [employer] login ID and cannot be changed, so think of one that is secure and easy to remember. 6. Create a [employer] password. You can change your password at any time. 7. Enter your Password Reset Question and Answer. This can be used at a later time if you forget your password. 8. Enter your e-mail address. You will receive e-mail notification when new information is available in [address] 9. Click Sign Up. You can now view and download portions of your medical record. 10. Click the Download Summary menu link to download a portable copy of your medical information. If you have questions, please visit the Frequently Asked Questions section of the [employer] website.  Remember, [employer] is NOT to be used for urgent needs. For medical emergencies, dial 911. Now available from your iPhone and Android! Please provide this summary of care documentation to your next provider. Your primary care clinician is listed as Daniel Donohue. If you have any questions after today's visit, please call [phone].

## 2018-04-23 ENCOUNTER — TELEPHONE (OUTPATIENT)
Dept: RHEUMATOLOGY | Age: 68
End: 2018-04-23

## 2018-04-23 RX ORDER — FOLIC ACID 1 MG/1
TABLET ORAL
Qty: 90 TAB | Refills: 0 | Status: SHIPPED | OUTPATIENT
Start: 2018-04-23 | End: 2018-06-20 | Stop reason: SDUPTHER

## 2018-04-23 NOTE — TELEPHONE ENCOUNTER
Spoke with pt and let her know that we sent her folic acid prescription today to [facility]. She stated an understanding.

## 2018-04-23 NOTE — TELEPHONE ENCOUNTER
Patient called because she is out of her prescription for Folic Acid. She stated that the her insurance faxed over a request for refill but did not get an answer. Patient can be reached at [phone].

## 2018-06-18 ENCOUNTER — OFFICE VISIT (OUTPATIENT)
Dept: RHEUMATOLOGY | Age: 68
End: 2018-06-18

## 2018-06-18 VITALS
RESPIRATION RATE: 18 BRPM | HEIGHT: 66 IN | SYSTOLIC BLOOD PRESSURE: 134 MMHG | DIASTOLIC BLOOD PRESSURE: 85 MMHG | HEART RATE: 92 BPM | BODY MASS INDEX: 47.09 KG/M2 | WEIGHT: 293 LBS | TEMPERATURE: 98.3 F

## 2018-06-18 DIAGNOSIS — R07.89 COSTOCHONDRAL CHEST PAIN: ICD-10-CM

## 2018-06-18 DIAGNOSIS — M05.79 SEROPOSITIVE RHEUMATOID ARTHRITIS OF MULTIPLE SITES (HCC): Primary | ICD-10-CM

## 2018-06-18 DIAGNOSIS — E55.9 VITAMIN D DEFICIENCY: ICD-10-CM

## 2018-06-18 DIAGNOSIS — Z79.60 LONG-TERM USE OF IMMUNOSUPPRESSANT MEDICATION: ICD-10-CM

## 2018-06-18 NOTE — PROGRESS NOTES
REASON FOR VISIT    This is a follow up visit for Ms. Cyrus Boudreaux for Seropositive Rheumatoid Arthritis. Inflammatory arthritis phenotype includes:  Anti-CCP positive: yes (21)  Rheumatoid factor positive: yes (94.8)  Erosive disease: yes  Extra-articular manifestations include: none    Immunosuppression Screening (7/31/2017):   Quantiferon TB: indeterminate  PPD: negative (2016)  PPD: negative (8/16/2017)  Hepatitis B: negative  Hepatitis C: negative    Therapy History includes:  Current DMARD therapy include: methotrexate 20 mg every Wednesday, Xeljanz 11 mg XR (6/18/2018)  Prior DMARD therapy include: gold (one year), Humira 40 mg every 14 days (10/31/2017), Enbrel every Monday (2/2018 - 6/11/2018)  Discontinued DMARDs because of inefficacy: gold, Enbrel  Discontinued DMARDs because of side effects: Humira (rash)    Bone Density Historical Synopsis    Height loss since age 29 (at least two inches): 0  Fracture history includes: no  Family history of hip fracture: no  Fall Risk: no    Daily calcium intake is 0 mg  Weekly vitamin D intake is 50,000 IU    Smoking history: no  Alcohol consumption: no  Prednisone history: yes    Exercise: no    Previous work-up for osteoporosis includes the following:  DEXA Scan: 8/28/2017  Vitamin 25OH D level: 24.7 (7/31/2017)  PTH: None  TSH: 1.38 (7/31/2017)    Therapy History includes:    Current osteoporosis therapy includes: none  Prior osteoporosis therapy includes: none  The following osteoporosis therapy have been ineffective: none  The following osteoporosis therapy were stopped because of side effects: none    Immunizations:   Immunization History   Administered Date(s) Administered    Influenza Vaccine 08/15/2017    Pneumococcal Polysaccharide (PPSV-23) 08/15/2017    TB Skin Test (PPD) Intradermal 08/14/2017    Zoster Vaccine, Live 08/15/2017     Active problems include:    Patient Active Problem List   Diagnosis Code    Chronic pulmonary embolism ([email or web address] I27.82    Essential hypertension I10    DNR (do not resuscitate) Z66    Leg swelling M79.89    Seropositive rheumatoid arthritis of multiple sites ([facility] M05.79    Long-term use of immunosuppressant medication Z79.899    Long term (current) use of systemic steroids Z79.52    Morbid obesity with BMI of 45.0-49.9, adult (HCC) E66.01, Z68.42    Fibromyalgia M79.7    Vitamin D deficiency E55.9    CKD (chronic kidney disease) stage 2, GFR 60-89 ml/min N18.2       HISTORY OF PRESENT ILLNESS    Ms. Kaela Petty returns for a follow up visit. On her last visit, I asked her to increase her methotrexate incrementally over the next two doses to 20 mg weekly and continue  Enbrel. I also continued probenecid-colchicine for costochondritis and is no longer having chest pain and is no longer on it. Today, she complains of pain and swelling in her hands (PIP). She cannot make a fist. She has stiffness lasting hours. She does not feel that methotrexate or Enbrel is not helping. Ms. Kaela Petty has continued her medications for arthritis (methotrexate, Enbrel) and reports good tolerance without significant side effects. Last toxicity monitoring by blood work was done on 3/15/2018 and did not reveal any significant adverse effects, except Hct 33.5%, eGFR 87 (previously 76, 64). Most recent inflammatory markers from 3/15/2018 revealed a ESR 94 mm/hr (previously 94, 93, 76 mm/hr) and CRP 26.8 mg/L (previously 19.7, 26.3, 43.6 mg/L). The patient has not had any interval hospital admissions, infections, or surgeries. REVIEW OF SYSTEMS    A comprehensive review of systems was performed and pertinent results are documented in the HPI, review of systems is otherwise non-contributory. PAST MEDICAL HISTORY    She has a past medical history of Anemia; Fibromyalgia; Hypertension; Osteoarthritis; Osteoporosis; Pulmonary embolism ([facility] [address] and [facility] ([facility]     FAMILY HISTORY    Her family history includes Arthritis-rheumatoid in her mother; Cancer in her father; Diabetes in her maternal grandmother and son; Heart Attack in her paternal grandmother; Schizophrenia in her son. SOCIAL HISTORY    She reports that she has never smoked. She has never used smokeless tobacco. She reports that she does not drink alcohol or use illicit drugs. MEDICATIONS    Current Outpatient Prescriptions   Medication Sig Dispense Refill    tofacitinib (XELJANZ XR) 11 mg Tb24 Take 11 mg by mouth daily for 30 days. 30 Tab 11    tofacitinib (XELJANZ XR) 11 mg Tb24 Take 11 mg by mouth daily for 30 days. Indications: Rheumatoid Arthritis 30 Tab 0    folic acid (FOLVITE) 1 mg tablet TAKE 1 TABLET EVERY DAY 90 Tab 0    methotrexate (RHEUMATREX) 2.5 mg tablet Take 8 Tabs by mouth every Wednesday for 90 days. 96 Tab 0    acetaminophen (TYLENOL ARTHRITIS PAIN) 650 mg TbER Take 650 mg by mouth as needed.  ergocalciferol (ERGOCALCIFEROL) 50,000 unit capsule Take 1 Cap by mouth every Wednesday. 12 Cap 3    furosemide (LASIX) 20 mg tablet Take 1 Tab by mouth daily as needed. 90 Tab 1    IRON, FERROUS SULFATE, PO Take 65 mg by mouth four (4) times daily.  XARELTO 20 mg tab tablet Take 1 Tab by mouth daily (with breakfast). 90 Tab 0    multivitamin (ONE A DAY) tablet Take 1 Tab by mouth daily.  metoprolol tartrate (LOPRESSOR) 50 mg tablet two (2) times a day. ALLERGIES    Allergies   Allergen Reactions    Clindamycin Swelling and Other (comments)     fever    Humira [Adalimumab] Hives       PHYSICAL EXAMINATION    Visit Vitals    /85    Pulse 92    Temp 98.3 °F (36.8 °C)    Resp 18    Ht 5' 6\" (1.676 m)    Wt 297 lb (134.7 kg)    BMI 47.94 kg/m2     Body mass index is 47.94 kg/(m^2). General: Patient is alert, oriented x 3, not in acute distress, daughter at bedside    HEENT:   Mouth is moist without lesions or erythema. No tongue edema.    Sclerae are not injected and appear moist.  There is no alopecia. Neck is supple     Cardiovascular:  Heart is regular rate and rhythm, no murmurs. Chest:  Lungs are clear to auscultation bilaterally. Abdomen:  Obese. Extremities:  Free of clubbing, cyanosis, edema    Neurological exam:  Muscle strength is full in upper and lower extremities    Skin exam:  There are no rashes, no alopecia, no discoid lesions, no active Raynaud's, no livedo reticularis, no periungual erythema. Musculoskeletal exam:  A comprehensive musculoskeletal exam was performed for all joints of each upper and lower extremity and assessed for swelling, tenderness and range of motion.  Positive results are documented as below:    Decreased ROM of wrists    Left elbow flexion deformity  Right 3rd PIP flexion deformity  Decreased ROM of ankles pain or swelling   Right MTP tenderness    Z-Deformities:   none  [place] Deformities:  none  Bout[place]ierre's Deformities:  none  Ulnar Deviation:   none     Joint Count 6/18/2018 4/16/2018 3/15/2018 1/8/2018 10/9/2017 8/14/2017 7/31/2017   Patient pain (0-100) 80 25 3 10 0 0 -   MHAQ 1 0.125 0 0 0 0.125 0.125   Left wrist- Tender 1 1 0 1 - 1 -   Left wrist- Swollen - - 0 - - 1 -   Left 3rd MCP - Tender 1 - - - - - -   Left 4th MCP - Tender 1 - - - - - -   Left thumb IP - Tender - 1 - - - - -   Left 2nd PIP - Tender 1 - - - - - -   Left 2nd PIP - Swollen 1 1 - - - - -   Left 3rd PIP - Tender 1 1 - - - - -   Left 3rd PIP - Swollen 1 1 - - - - -   Left 4th PIP - Tender 1 1 - - - - -   Left 4th PIP - Swollen 1 1 - - - - -   Left 5th PIP - Tender 1 1 - - - - -   Left 5th PIP - Swollen 1 1 - - - - -   Right wrist- Tender 1 - - - - 1 -   Right wrist- Swollen 1 1 - - - 1 -   Right 2nd MCP - Swollen - - - 1 1 - -   Right 3rd MCP - Tender 1 - - - - - -   Right 3rd MCP - Swollen 1 - - - 1 - -   Right 4th MCP - Swollen 1 - - - - - -   Right 2nd PIP - Tender 1 - - - - - -   Right 2nd PIP - Swollen 1 1 - 1 1 - -   Right 3rd PIP - Tender 1 - - - - - -   Right 3rd PIP - Swollen 1 - - - 1 - -   Right 4th PIP - Tender 1 - - - - - -   Right 4th PIP - Swollen 1 - - - - - -   Right 5th PIP - Tender 1 - - - - - -   Right 5th PIP - Swollen 1 - - - - - -   Tender Joint Count (Total) 13 5 0 1 - 2 -   Swollen Joint Count (Total) 11 6 0 2 4 2 -   Physician Assessment (0-10) 4 3 0 1 - 2 -   Patient Assessment (0-10) 8 3 3 1 0 0 -   CDAI Total (calculated) 36 17 3 5 - 6 -       DATA REVIEW    Laboratory     Recent laboratory results were reviewed, summarized, and discussed with the patient. Imaging    Musculoskeletal Ultrasound    None    Radiographs    Chest 3/15/2018: clear lungs. The cardiac and mediastinal contours and pulmonary vascularity are normal.  The bones and soft tissues are within normal limits. Bilateral Hand 7/31/2017: RIGHT: no fracture. There is diffuse osteopenia. Both corticated and non corticated erosive changes are present involving the radiocarpal joint, carpal joints, and metacarpal carpal joints. In addition, there is involvement of the first and second metacarpal phalangeal joints, most pronounced in the first. The lunate and radius appear ankylosed. LEFT:  no fracture. There is diffuse osteopenia. Both corticated and non corticated erosive changes are present involving the radiocarpal joint, carpal joints, and metacarpal carpal joints. The lunate and radius appear ankylosed. Some erosive changes are also present in the third PIP joint. The scaphoid lunate distance is widened. Left Elbow 7/31/2017: marked flattening of the radial head with sclerosis. In erosive changes also present of the proximal ulna. A moderate joint effusion is seen. Use osteopenia is noted. Bilateral Foot 7/31/2017: RIGHT: diffuse osteopenia. Non corticated erosions are present involving the second, fourth, and fifth MTP joints. Associated soft tissue swelling is present. The there may be an ankylosis of the fourth and third metatarsals to the midfoot.  Flattening of the second metatarsal head is noted. There is a small Achilles and moderate the plantar spur. No fracture is seen. LEFT: diffuse osteopenia. Erosive changes are present at the second and third metatarsal tarsal joints. A small spur is noted at the Achilles insertion. No fractures identified. Less forefoot soft tissue swelling is present. No fracture is seen    Chest 1/30/2017: lungs remain clear. No pneumothorax or pleural effusion apparent. Cardiac silhouette remains large. Endotracheal tube and nasogastric tube have been removed. Left central line stable in position with tip projecting over the superior vena cava. Right Hip 4/26/2016: no fracture or dislocation. The right hip joint spaces normal and symmetric with the left side. Enthesophytes are seen along either iliac crest and there is relatively severe bilateral facet hypertrophy at L5-S1. The sacroiliac joints appear grossly normal.     Right Femur 4/26/2016: the patient is status post prior placement of a 3 component right total knee prosthesis. From this examination a full assessment of the prosthesis is limited. Grossly this examination is negative for a loosening of the prosthetic components, heterotopic ossification, or additional complications of the prosthesis. The joint spaces of the right hip are well maintained without significant osteoarthritis. This examination is negative for a fracture or an insufficiency fracture of the proximal femur. This examination examination is negative for focal lytic or blastic lesions of the right femur. CT Imaging    CT Abdomen and Pelvis without contrast 7/31/2017:there are linear densities at both lung bases suggesting scarring or subsegmental atelectasis. The lung bases are otherwise clear no pleural effusion is seen. The liver spleen and pancreas are unremarkable. No renal stone or mass is seen. No ureteral dilatation or stone is seen. The urinary bladder is unremarkable.  A urinary catheter is noted with tip within the urinary bladder. Small calcifications within the uterus are again noted. The uterus and pelvic adnexa are otherwise unremarkable. No dilated bowel or free air or free fluid is seen. Specifically there is no evidence of retroperitoneal or intra-abdominal hemorrhage. An inferior vena cava filter is noted. MR Imaging    MRI Lumbar Spine with and without contrast 4/26/2016: study is suboptimal secondary to patient's body habitus. T12-L1: Normal for age. L1-L2: Normal for age. L2-L3: There is mild facet joint arthropathy. L3-L4: There is mild disc disease with concentric bulge. Moderate to severe facet arthropathy is present with bony hypertrophy and ligamentous thickening. There is moderate central canal stenosis and lateral recess narrowing. Bilateral foraminal narrowing is present. There is absence of fat within the right neural foramen suggesting involvement of the exiting L3 nerve root. L4-L5: There is mild disc disease with loss of disc height. Moderate to severe facet arthropathy is present with bony hypertrophy and ligamentous thickening. There is moderate central canal stenosis primarily in a transverse direction. Severe lateral recess narrowing is present with moderate bilateral foraminal narrowing. Absence of fat within the right neural foramen suggests involvement of the exiting right L4 nerve root. L5-S1: There is mild disc degeneration with loss of disc height. Moderate to severe facet arthropathy is present with bony hypertrophy and ligamentous thickening. No significant canal stenosis. There is mild lateral recess narrowing. Bilateral neural foraminal narrowing is also present, right greater than left. Absence of fat within the right neural foramen suggests involvement of the exiting right L5 nerve root. Visualized portions of the sacrum are normal. There is no abnormal enhancement. The conus is normal in contour and signal characteristics. Paraspinal soft tissues are unremarkable.      DXA     DXA 8/28/2017: (excluded None) showed lumbar spine L1-L4 T score 1.2 (BMD 1.345 g/cm2), left femoral neck T score -0.9 (0.919 g/cm2), left total hip T score: 0.1 (1.020 g/cm2), right femoral neck T score: -0.4 (0.977 g/cm2), right total hip T score: -0.6 (0.938 g/cm2), and distal one third left radius T score N/A (BMD N/A g/cm2). FRAX score 5.8 % probability in 10 years for major osteoporotic fracture and 0.4 % 10 year probability of hip fracture. ASSESSMENT AND PLAN    This is a follow up visit for Ms. Leona Best. 1) Seropositive Erosive Rheumatoid Arthritis. She is maintained on methotrexate 20 mg every Wednesday and Enbrel weekly with good tolerance but continues to complain of active disease. She has not felt any improvement with the increase methotrexate dose. In fact, she appears a little worse. Her CDAI was 36 (previously 17, 3, 5) with 13 tender and 11 swollen joints, consistent with high disease activity. I discussed changing her Enbrel to either Debe Love or rituximab. She preferred Debe Love. She denies a history of diverticulitis. I gave her a sample today, filled the [employer] form and submitted an order to [facility]. I discontinued Enbrel and will continue methotrexate. Labs today. 2) Long Term Use of Immunosuppressants. The patient remains on immunomodulatory medications (methotrexate, Enbrel) and requires frequent toxicity monitoring by blood work. Respective labs were done last week (CBC and CMP). 3) Costochondritis. This has resolved. She is no longer on colchicine. 4) Morbid Obesity. Her BMI was 47.94 (previously 48.74, 48.10, 47.78, 48.97, 48.74). Weight loss is recommended.     5) Fibromyalgia. Her history, constellation of symptoms, and examination are consistent with a pain syndrome, possiblity secondary to Rheumatoid Arthritis.    6) Right Sided Carpal Tunnel Syndrome. This was not an active issue today. 7) Vitamin D Deficiency. Her vitamin D level was 41.1 (previously 24.7).  She is on weekly ergocalciferol 50,000. I will check her level today. 8) CKD Stage 2. Her creatinine was 0.82 mg/dL, eGFR 87 (previously 76). The patient voiced understanding of the aforementioned assessment and plan. Summary of plan was provided in the After Visit Summary patient instructions.      TODAY'S ORDERS    Orders Placed This Encounter    METABOLIC PANEL, COMPREHENSIVE    C REACTIVE PROTEIN, QT    SED RATE (ESR)    VITAMIN D, 25 HYDROXY    tofacitinib (XELJANZ XR) 11 mg Tb24    tofacitinib (XELJANZ XR) 11 mg Tb24     Future Appointments  Date Time Provider Constance Lois   9/17/2018 3:00 PM MD Pete Stafford MD, [address] [facility]    Adult Rheumatology   Musculoskeletal Ultrasound Certified  [address] [facility], [address] [facility]   Phone [phone]  Fax [phone]

## 2018-06-18 NOTE — MR AVS SNAPSHOT
[address] 
[phone] Patient: Deondre Ibanez MRN: [id number] AKC:57/1/[id number] Visit Information Date & Time Provider Department Dept. Phone Encounter #  
 6/18/2018  3:00 PM Ada Khan MD [address] [facility] [id number] Follow-up Instructions Return in about 3 months (around 9/18/2018). Upcoming Health Maintenance Date Due DTaP/Tdap/Td series (1 - Tdap) 10/2/1973 GLAUCOMA SCREENING Q2Y 10/2/2017 BREAST CANCER SCRN MAMMOGRAM 7/31/2018 MEDICARE YEARLY EXAM 8/1/2018 Influenza Age 5 to Adult 8/1/2018 Pneumococcal 65+ Low/Medium Risk (1 of 2 - PCV13) 8/15/2018 COLONOSCOPY 7/31/2027 Allergies as of 6/18/2018  Review Complete On: 6/18/2018 By: Ada Khan MD  
  
 Severity Noted Reaction Type Reactions Clindamycin  12/18/2017    Swelling, Other (comments) fever Humira [Adalimumab]  01/15/2018    Hives Current Immunizations  Reviewed on 1/29/2018 Name Date Influenza Vaccine 8/15/2017 Pneumococcal Polysaccharide (PPSV-23) 8/15/2017 TB Skin Test (PPD) Intradermal 8/14/2017 Zoster Vaccine, Live 8/15/2017 Not reviewed this visit You Were Diagnosed With   
  
 Codes Comments Seropositive rheumatoid arthritis of multiple sites [facility])    -  Primary ICD-10-CM: M05.79 ICD-9-CM: 714.0 Long-term use of immunosuppressant medication     ICD-10-CM: Z79.899 ICD-9-CM: V58.69 Vitals BP Pulse Temp Resp Height(growth percentile) Weight(growth percentile) 134/85 92 98.3 °F (36.8 °C) 18 5' 6\" (1.676 m) 297 lb (134.7 kg) BMI OB Status Smoking Status 47.94 kg/m2 Postmenopausal Never Smoker BMI and BSA Data Body Mass Index Body Surface Area  
 47.94 kg/m 2 2.5 m 2 Preferred Pharmacy Pharmacy Name Phone [address] [phone] Your Updated Medication List  
  
   
This list is accurate as of 6/18/18  3:09 PM.  Always use your most recent med list.  
  
  
  
  
 ergocalciferol 50,000 unit capsule Commonly known as:  ERGOCALCIFEROL Take 1 Cap by mouth every Wednesday. folic acid 1 mg tablet Commonly known as:  FOLVITE  
TAKE 1 TABLET EVERY DAY  
  
 furosemide 20 mg tablet Commonly known as:  LASIX Take 1 Tab by mouth daily as needed. IRON (FERROUS SULFATE) PO Take 65 mg by mouth four (4) times daily. methotrexate 2.5 mg tablet Commonly known as:  Maricruz Jungling Take 8 Tabs by mouth every Wednesday for 90 days. metoprolol tartrate 50 mg tablet Commonly known as:  LOPRESSOR  
two (2) times a day. multivitamin tablet Commonly known as:  ONE A DAY Take 1 Tab by mouth daily. * tofacitinib 11 mg Tb24 Commonly known as:  XELJANZ XR Take 11 mg by mouth daily for 30 days. * tofacitinib 11 mg Tb24 Commonly known as:  XELJANZ XR Take 11 mg by mouth daily for 30 days. Indications: Rheumatoid Arthritis TYLENOL ARTHRITIS PAIN 650 mg Dione Martinis Generic drug:  acetaminophen Take 650 mg by mouth as needed. XARELTO 20 mg Tab tablet Generic drug:  rivaroxaban Take 1 Tab by mouth daily (with breakfast). * Notice: This list has 2 medication(s) that are the same as other medications prescribed for you. Read the directions carefully, and ask your doctor or other care provider to review them with you. Prescriptions Sent to Pharmacy Refills  
 tofacitinib (XELJANZ XR) 11 mg Tb24 11 Sig: Take 11 mg by mouth daily for 30 days. Class: Normal  
 Pharmacy: [address] Ph #: [phone] Route: Oral  
  
We Performed the Following C REACTIVE PROTEIN, QT [32957 CPT(R)] METABOLIC PANEL, COMPREHENSIVE [19106 CPT(R)] SED RATE (ESR) [id number] CPT(R)] Follow-up Instructions Return in about 3 months (around 9/18/2018). Patient Instructions STOP ENBREL CONTINUE methotrexate Tofacitinib (By mouth) Tofacitinib (toe-fa-SYE-ti-nib) Treats rheumatoid arthritis. Brand Name(s): Moe Shelton EVANS There may be other brand names for this medicine. When This Medicine Should Not Be Used: This medicine is not right for everyone. Do not use it if you had an allergic reaction to tofacitinib. How to Use This Medicine:  
Tablet, Long Acting Tablet · Your doctor will tell you how much medicine to use. Do not use more than directed. · Swallow the extended-release tablet whole. Do not crush, break, or chew it. · If you take the extended-release tablet, part of the tablet may pass into your stools. This is normal and is nothing to worry about. · This medicine should come with a Medication Guide. Ask your pharmacist for a copy if you do not have one. · Missed dose: Take a dose as soon as you remember. If it is almost time for your next dose, wait until then and take a regular dose. Do not take extra medicine to make up for a missed dose. · Store the medicine in a closed container at room temperature, away from heat, moisture, and direct light. Drugs and Foods to Avoid: Ask your doctor or pharmacist before using any other medicine, including over-the-counter medicines, vitamins, and herbal products. · Some medicines can affect how tofacitinib works. Tell your doctor if you are using any of the following: ¨ Medicines that weaken your immune system (including azathioprine, cyclosporine, methotrexate, tacrolimus) ¨ Medicine to treat infection (including fluconazole, ketoconazole, rifampin) ¨ NSAID medicine (including aspirin, diclofenac, ibuprofen, naproxen) ¨ Steroid medicine (including dexamethasone, hydrocortisone, methylprednisolone, prednisolone, prednisone) · This medicine may interfere with vaccines. Ask your doctor before you get a flu shot or any other vaccines. Warnings While Using This Medicine: · It is not safe to take this medicine during pregnancy. It could harm an unborn baby. Tell your doctor right away if you become pregnant. Use an effective form of birth control during treatment with this medicine and for at least 4 weeks after your last dose. · Do not breastfeed while you are using this medicine. · Tell your doctor if you have kidney disease, liver disease (including hepatitis B or C), lung disease (including interstitial lung disease), HIV, cancer or a history of cancer, blood problems, diabetes, high cholesterol, stomach or bowel problems (including blockage, diverticulitis, ulcers), or a history of tuberculosis. Also tell your doctor if you have a current infection or an infection that keeps coming back. · This medicine may cause the following problems: 
¨ Increased risk for serious infections, including herpes infection or shingles ¨ Increased risk for certain cancers, including nonmelanoma skin cancer or lymphoma ¨ Stomach or bowel perforation (tear or hole) ¨ High cholesterol in the blood · Talk with your doctor before using this medicine if you plan to have children. Some women who use this medicine have become infertile (unable to have children). · This medicine may make you bleed, bruise, or get infections more easily. Take precautions to prevent illness and injury. Wash your hands often. · Your doctor will do lab tests at regular visits to check on the effects of this medicine. Keep all appointments. · Keep all medicine out of the reach of children. Never share your medicine with anyone. Possible Side Effects While Using This Medicine:  
Call your doctor right away if you notice any of these side effects: · Allergic reaction: Itching or hives, swelling in your face or hands, swelling or tingling in your mouth or throat, chest tightness, trouble breathing · Change in how much or how often you urinate, difficult or painful urination · Dark urine or pale stools, nausea, vomiting, loss of appetite, stomach pain, yellow skin or eyes · Fever, chills, cough, difficulty breathing, runny or stuffy nose, sore throat, night sweats, body aches · Skin or mole changes, sores that do not heal 
· Stomach pain, cramping, bloody stools · Swollen glands in your neck, armpits, or groin · Unusual bleeding, bruising, or weakness If you notice other side effects that you think are caused by this medicine, tell your doctor. Call your doctor for medical advice about side effects. You may report side effects to FDA at 4-631-FDA-5046 © 2017 [address] Information is for End User's use only and may not be sold, redistributed or otherwise used for commercial purposes. The above information is an  only. It is not intended as medical advice for individual conditions or treatments. Talk to your doctor, nurse or pharmacist before following any medical regimen to see if it is safe and effective for you. Introducing [facility]! [employer] introduces [employer]el patient portal. Now you can access parts of your medical record, email your doctor's office, and request medication refills online. 1. In your internet browser, go to [email or web address] 2. Click on the First Time User? Click Here link in the Sign In box. You will see the New Member Sign Up page. 3. Enter your Genticel Access Code exactly as it appears below. You will not need to use this code after youve completed the sign-up process. If you do not sign up before the expiration date, you must request a new code. · Gent[employer]el Access Code: [email or web address] Expires: 7/15/2018  4:35 PM 
 
4.  Enter the last four digits of your Social Security Number (xxxx) and Date of Birth (mm/dd/yyyy) as indicated and click Submit. You will be taken to the next sign-up page. 5. Create a RadioRx ID. This will be your RadioRx login ID and cannot be changed, so think of one that is secure and easy to remember. 6. Create a RadioRx password. You can change your password at any time. 7. Enter your Password Reset Question and Answer. This can be used at a later time if you forget your password. 8. Enter your e-mail address. You will receive e-mail notification when new information is available in [address] 9. Click Sign Up. You can now view and download portions of your medical record. 10. Click the Download Summary menu link to download a portable copy of your medical information. If you have questions, please visit the Frequently Asked Questions section of the RadioRx website. Remember, RadioRx is NOT to be used for urgent needs. For medical emergencies, dial 911. Now available from your iPhone and Android! Please provide this summary of care documentation to your next provider. Your primary care clinician is listed as Daniel Donohue. If you have any questions after today's visit, please call [phone].

## 2018-06-18 NOTE — PATIENT INSTRUCTIONS
STOP ENBREL    CONTINUE methotrexate    Tofacitinib (By mouth)   Tofacitinib (toe-fa-SYE-ti-nib)  Treats rheumatoid arthritis. Brand Name(s): Nakul KRUEGER   There may be other brand names for this medicine. When This Medicine Should Not Be Used: This medicine is not right for everyone. Do not use it if you had an allergic reaction to tofacitinib. How to Use This Medicine:   Tablet, Long Acting Tablet  · Your doctor will tell you how much medicine to use. Do not use more than directed. · Swallow the extended-release tablet whole. Do not crush, break, or chew it. · If you take the extended-release tablet, part of the tablet may pass into your stools. This is normal and is nothing to worry about. · This medicine should come with a Medication Guide. Ask your pharmacist for a copy if you do not have one. · Missed dose: Take a dose as soon as you remember. If it is almost time for your next dose, wait until then and take a regular dose. Do not take extra medicine to make up for a missed dose. · Store the medicine in a closed container at room temperature, away from heat, moisture, and direct light. Drugs and Foods to Avoid:   Ask your doctor or pharmacist before using any other medicine, including over-the-counter medicines, vitamins, and herbal products. · Some medicines can affect how tofacitinib works. Tell your doctor if you are using any of the following:   ¨ Medicines that weaken your immune system (including azathioprine, cyclosporine, methotrexate, tacrolimus)  ¨ Medicine to treat infection (including fluconazole, ketoconazole, rifampin)  ¨ NSAID medicine (including aspirin, diclofenac, ibuprofen, naproxen)  ¨ Steroid medicine (including dexamethasone, hydrocortisone, methylprednisolone, prednisolone, prednisone)  · This medicine may interfere with vaccines. Ask your doctor before you get a flu shot or any other vaccines.   Warnings While Using This Medicine:   · It is not safe to take this medicine during pregnancy. It could harm an unborn baby. Tell your doctor right away if you become pregnant. Use an effective form of birth control during treatment with this medicine and for at least 4 weeks after your last dose. · Do not breastfeed while you are using this medicine. · Tell your doctor if you have kidney disease, liver disease (including hepatitis B or C), lung disease (including interstitial lung disease), HIV, cancer or a history of cancer, blood problems, diabetes, high cholesterol, stomach or bowel problems (including blockage, diverticulitis, ulcers), or a history of tuberculosis. Also tell your doctor if you have a current infection or an infection that keeps coming back. · This medicine may cause the following problems:  ¨ Increased risk for serious infections, including herpes infection or shingles  ¨ Increased risk for certain cancers, including nonmelanoma skin cancer or lymphoma  ¨ Stomach or bowel perforation (tear or hole)  ¨ High cholesterol in the blood  · Talk with your doctor before using this medicine if you plan to have children. Some women who use this medicine have become infertile (unable to have children). · This medicine may make you bleed, bruise, or get infections more easily. Take precautions to prevent illness and injury. Wash your hands often. · Your doctor will do lab tests at regular visits to check on the effects of this medicine. Keep all appointments. · Keep all medicine out of the reach of children. Never share your medicine with anyone.   Possible Side Effects While Using This Medicine:   Call your doctor right away if you notice any of these side effects:  · Allergic reaction: Itching or hives, swelling in your face or hands, swelling or tingling in your mouth or throat, chest tightness, trouble breathing  · Change in how much or how often you urinate, difficult or painful urination  · Dark urine or pale stools, nausea, vomiting, loss of appetite, stomach pain, yellow skin or eyes  · Fever, chills, cough, difficulty breathing, runny or stuffy nose, sore throat, night sweats, body aches  · Skin or mole changes, sores that do not heal  · Stomach pain, cramping, bloody stools  · Swollen glands in your neck, armpits, or groin  · Unusual bleeding, bruising, or weakness  If you notice other side effects that you think are caused by this medicine, tell your doctor. Call your doctor for medical advice about side effects. You may report side effects to FDA at 4-370-FDA-5847  © 2017 [address] Information is for End User's use only and may not be sold, redistributed or otherwise used for commercial purposes. The above information is an  only. It is not intended as medical advice for individual conditions or treatments. Talk to your doctor, nurse or pharmacist before following any medical regimen to see if it is safe and effective for you.

## 2018-06-19 LAB
25(OH)D3+25(OH)D2 SERPL-MCNC: 48.1 NG/ML (ref 30–100)
ALBUMIN SERPL-MCNC: 4.1 G/DL (ref 3.6–4.8)
ALBUMIN/GLOB SERPL: 1.2 {RATIO} (ref 1.2–2.2)
ALP SERPL-CCNC: 97 IU/L (ref 39–117)
ALT SERPL-CCNC: 7 IU/L (ref 0–32)
AST SERPL-CCNC: 15 IU/L (ref 0–40)
BILIRUB SERPL-MCNC: 0.5 MG/DL (ref 0–1.2)
BUN SERPL-MCNC: 11 MG/DL (ref 8–27)
BUN/CREAT SERPL: 16 (ref 12–28)
CALCIUM SERPL-MCNC: 9.6 MG/DL (ref 8.7–10.3)
CHLORIDE SERPL-SCNC: 103 MMOL/L (ref 96–106)
CO2 SERPL-SCNC: 19 MMOL/L (ref 20–29)
CREAT SERPL-MCNC: 0.68 MG/DL (ref 0.57–1)
CRP SERPL-MCNC: 25 MG/L (ref 0–4.9)
ERYTHROCYTE [SEDIMENTATION RATE] IN BLOOD BY WESTERGREN METHOD: 88 MM/HR (ref 0–40)
GFR SERPLBLD CREATININE-BSD FMLA CKD-EPI: 106 ML/MIN/1.73
GFR SERPLBLD CREATININE-BSD FMLA CKD-EPI: 92 ML/MIN/1.73
GLOBULIN SER CALC-MCNC: 3.4 G/DL (ref 1.5–4.5)
GLUCOSE SERPL-MCNC: 84 MG/DL (ref 65–99)
POTASSIUM SERPL-SCNC: 4.4 MMOL/L (ref 3.5–5.2)
PROT SERPL-MCNC: 7.5 G/DL (ref 6–8.5)
SODIUM SERPL-SCNC: 141 MMOL/L (ref 134–144)

## 2018-06-19 NOTE — PROGRESS NOTES
The results were reviewed and a letter was sent. Elevated inflammatory markers (ESR, CRP). Remaining labs are good.

## 2018-06-20 ENCOUNTER — TELEPHONE (OUTPATIENT)
Dept: RHEUMATOLOGY | Age: 68
End: 2018-06-20

## 2018-06-20 DIAGNOSIS — M05.79 SEROPOSITIVE RHEUMATOID ARTHRITIS OF MULTIPLE SITES (HCC): ICD-10-CM

## 2018-06-20 RX ORDER — METHOTREXATE 2.5 MG/1
20 TABLET ORAL
Qty: 96 TAB | Refills: 0 | Status: SHIPPED | OUTPATIENT
Start: 2018-06-20 | End: 2018-09-17 | Stop reason: SDUPTHER

## 2018-06-20 RX ORDER — FOLIC ACID 1 MG/1
TABLET ORAL
Qty: 90 TAB | Refills: 0 | Status: SHIPPED | OUTPATIENT
Start: 2018-06-20 | End: 2018-08-08 | Stop reason: SDUPTHER

## 2018-06-20 NOTE — TELEPHONE ENCOUNTER
A refill was sent to [facility] for her methotrexate and folic acid.

## 2018-06-20 NOTE — TELEPHONE ENCOUNTER
Patient is calling due to she has no more refills for her Methotrexate through [facility]. Her phone is [phone].

## 2018-06-27 ENCOUNTER — DOCUMENTATION ONLY (OUTPATIENT)
Dept: RHEUMATOLOGY | Age: 68
End: 2018-06-27

## 2018-06-27 NOTE — PROGRESS NOTES
Received a fax from [employer] has been approved by [address] [place] [address] good 6/22/2018 until 6/21/2020. [facility] has contacted the patient and arranged delivery for 6/29/2018 with zero copay.

## 2018-08-06 ENCOUNTER — OFFICE VISIT (OUTPATIENT)
Dept: INTERNAL MEDICINE CLINIC | Age: 68
End: 2018-08-06

## 2018-08-06 ENCOUNTER — HOSPITAL ENCOUNTER (OUTPATIENT)
Dept: LAB | Age: 68
Discharge: HOME | End: 2018-08-06
Payer: MEDICARE

## 2018-08-06 VITALS
TEMPERATURE: 97.1 F | RESPIRATION RATE: 17 BRPM | BODY MASS INDEX: 47.09 KG/M2 | WEIGHT: 293 LBS | OXYGEN SATURATION: 96 % | DIASTOLIC BLOOD PRESSURE: 77 MMHG | HEIGHT: 66 IN | SYSTOLIC BLOOD PRESSURE: 130 MMHG | HEART RATE: 71 BPM

## 2018-08-06 DIAGNOSIS — Z13.31 SCREENING FOR DEPRESSION: ICD-10-CM

## 2018-08-06 DIAGNOSIS — I10 ESSENTIAL HYPERTENSION: ICD-10-CM

## 2018-08-06 DIAGNOSIS — M79.89 LEG SWELLING: ICD-10-CM

## 2018-08-06 DIAGNOSIS — Z00.00 MEDICARE ANNUAL WELLNESS VISIT, SUBSEQUENT: Primary | ICD-10-CM

## 2018-08-06 DIAGNOSIS — Z23 ENCOUNTER FOR IMMUNIZATION: ICD-10-CM

## 2018-08-06 DIAGNOSIS — Z13.39 SCREENING FOR ALCOHOLISM: ICD-10-CM

## 2018-08-06 DIAGNOSIS — I27.82 OTHER CHRONIC PULMONARY EMBOLISM WITHOUT ACUTE COR PULMONALE (HCC): ICD-10-CM

## 2018-08-06 DIAGNOSIS — M05.79 SEROPOSITIVE RHEUMATOID ARTHRITIS OF MULTIPLE SITES (HCC): ICD-10-CM

## 2018-08-06 DIAGNOSIS — Z12.31 ENCOUNTER FOR SCREENING MAMMOGRAM FOR MALIGNANT NEOPLASM OF BREAST: ICD-10-CM

## 2018-08-06 DIAGNOSIS — E55.9 VITAMIN D DEFICIENCY: ICD-10-CM

## 2018-08-06 PROCEDURE — 83036 HEMOGLOBIN GLYCOSYLATED A1C: CPT

## 2018-08-06 PROCEDURE — 85025 COMPLETE CBC W/AUTO DIFF WBC: CPT

## 2018-08-06 PROCEDURE — 84443 ASSAY THYROID STIM HORMONE: CPT

## 2018-08-06 PROCEDURE — 80061 LIPID PANEL: CPT

## 2018-08-06 RX ORDER — TOFACITINIB 11 MG/1
TABLET, FILM COATED, EXTENDED RELEASE ORAL
COMMUNITY
Start: 2018-07-20 | End: 2018-09-17

## 2018-08-06 NOTE — PROGRESS NOTES
This is the Subsequent Medicare Annual Wellness Exam, performed 12 months or more after the Initial AWV or the last Subsequent AWV    I have reviewed the patient's medical history in detail and updated the computerized patient record. She  is a 74y.o. year old female with h/o RA, HTN, PE  who presents today for 6 months follow up. She is here with her daughter today. 1. RA: She has been following up with Rheumatologist Dr. Vita Larkin. Recently started  [place]. For her uncontrolled RA symptoms. 2. Low Vit D: She has been taking weekly Vit D. Vit d level is well controlled. Lab Results   Component Value Date/Time    VITAMIN D, 25-HYDROXY 48.1 06/18/2018 03:32 PM                    3. Anemia: has been stable with iron supplement. Lab Results   Component Value Date/Time     HGB 11.0 03/15//2018       4. HTN: well controlled with metoprolol.      5. PE: Takes Xarelto  20 mg alternatively per hematologist advice. 2 nd PE was diagnosed in 1/2017.       Beside knee pain, shoulder pain she says she is doing fine. Denies any chest pain, soa, diarrhea, bloody stool    Declined pap, mammogram.       History     Past Medical History:   Diagnosis Date    Anemia     Fibromyalgia     Hypertension     Osteoarthritis     Osteoporosis     Pulmonary embolism (HCC)     Rheumatoid arthritis ([facility] [address]       Past Surgical History:   Procedure Laterality Date    HX KNEE REPLACEMENT Bilateral     HX TUBAL LIGATION       Current Outpatient Prescriptions   Medication Sig Dispense Refill    methotrexate (RHEUMATREX) 2.5 mg tablet Take 8 Tabs by mouth every Wednesday for 90 days. 96 Tab 0    folic acid (FOLVITE) 1 mg tablet TAKE 1 TABLET EVERY DAY 90 Tab 0    acetaminophen (TYLENOL ARTHRITIS PAIN) 650 mg TbER Take 650 mg by mouth as needed.  ergocalciferol (ERGOCALCIFEROL) 50,000 unit capsule Take 1 Cap by mouth every Wednesday.  12 Cap 3    IRON, FERROUS SULFATE, PO Take 65 mg by mouth four (4) times daily.  XARELTO 20 mg tab tablet Take 1 Tab by mouth daily (with breakfast). 90 Tab 0    multivitamin (ONE A DAY) tablet Take 1 Tab by mouth daily.  metoprolol tartrate (LOPRESSOR) 50 mg tablet two (2) times a day.  XELJANZ XR 11 mg Tb24       furosemide (LASIX) 20 mg tablet Take 1 Tab by mouth daily as needed. 90 Tab 1     Allergies   Allergen Reactions    Clindamycin Swelling and Other (comments)     fever    Humira [Adalimumab] Hives     Family History   Problem Relation Age of Onset   [place] Arthritis-rheumatoid Mother     Cancer Father      lung    Diabetes Maternal Grandmother     Heart Attack Paternal Grandmother     Diabetes Son     Schizophrenia Son      Social History   Substance Use Topics    Smoking status: Never Smoker    Smokeless tobacco: Never Used    Alcohol use No     Patient Active Problem List   Diagnosis Code    Chronic pulmonary embolism (HCC) I27.82    Essential hypertension I10    DNR (do not resuscitate) Z66    Leg swelling M79.89    Seropositive rheumatoid arthritis of multiple sites ([facility] [address] M05.79    Long-term use of immunosuppressant medication Z79.899    Long term (current) use of systemic steroids Z79.52    Morbid obesity with BMI of 45.0-49.9, adult (HCC) E66.01, Z68.42    Fibromyalgia M79.7    Vitamin D deficiency E55.9    CKD (chronic kidney disease) stage 2, GFR 60-89 ml/min N18.2       Depression Risk Factor Screening:     PHQ over the last two weeks 8/6/2018   Little interest or pleasure in doing things Not at all   Feeling down, depressed, irritable, or hopeless Not at all   Total Score PHQ 2 0     Alcohol Risk Factor Screening: You do not drink alcohol or very rarely. Functional Ability and Level of Safety:   Hearing Loss  Hearing is good. Activities of Daily Living  The home contains: handrails, grab bars and rugs  Patient does total self care daughter helps bringing patient for appointments and grocery.      Fall Risk  Fall Risk Assessment, last 12 mths 8/6/2018   Able to walk? Yes   Fall in past 12 months? No       Abuse Screen  Patient is not abused    Cognitive Screening   Evaluation of Cognitive Function:  Has your family/caregiver stated any concerns about your memory: no  Normal    General appearance: alert, cooperative, no distress, appears stated age,obese. Ears: normal TM's and external ear canals AU  Nose: Nares normal. Septum midline. Mucosa normal. No drainage or sinus tenderness. Throat: Lips, mucosa, and tongue normal. Teeth and gums normal  Lungs: clear to auscultation bilaterally  Heart: regular rate and rhythm, S1, S2 normal, no murmur, click, rub or gallop  Abdomen: Obese. Non tender. No organomegaly detected. Extremities: edema 2+. Uses cane to ambulate.        Patient Care Team   Patient Care Team:  Jose Rafael Gifford MD as PCP - General (Family Practice)  Verónica Elliott MD (Orthopedic Surgery)  Hang Azevedo MD (Vascular Surgery)  Kendra Thompson MD (Hematology and Oncology)  Kaylah Hook MD (Cardiology)    Assessment/Plan   Education and counseling provided:  Are appropriate based on today's review and evaluation  Pneumococcal Vaccine  Screening Mammography  Screening Pap and pelvic (covered once every 2 years)  Colorectal cancer screening tests  Bone mass measurement (DEXA)  Screening for glaucoma  Diabetes screening test    Diagnoses and all orders for this visit:    1. Medicare annual wellness visit, subsequent  -     Annual  Alcohol Screen 15 min ()  -     Depression Screen Annual  -     Bilateral Digital Screening Mammography; Future  -     Tetanus, Diphtheria Toxoids and Acellular Pertussis Vaccine (TDAP)  -     CBC WITH AUTOMATED DIFF  -     LIPID PANEL  -     HEMOGLOBIN A1C WITH EAG  -     TSH AND FREE T4    2. Other chronic pulmonary embolism without acute cor pulmonale (HCC)    -  Pt is currently taking xarelto 20 mg alternative day per hematologist advise.  pain is to cut down to 15 mg per patient. Advised pt to sign med release form to obtain records. 3. Essential hypertension      - BP well controlled with metoprolol. 4. Vitamin D deficiency     -  Continue weekly supplement. 5. Seropositive rheumatoid arthritis of multiple sites [facility])    - per rheumatologist  6. Leg swelling     -stable with as needed xarelto. 7. Screening for alcoholism  -     Annual  Alcohol Screen 15 min ()    8. Screening for depression  -     Depression Screen Annual    9. Encounter for screening mammogram for malignant neoplasm of breast  -     Bilateral Digital Screening Mammography; Future             Pt ddi not want to mammogram but order it incase she change her mind. 10. Encounter for immunization  -     Tetanus, Diphtheria Toxoids and Acellular Pertussis Vaccine (TDAP)    11: Obesity. Encouraged pt for water aerobic exercise, stationary biking, weight bearing exercise. Health Maintenance Due   Topic Date Due    DTaP/Tdap/Td series (1 - Tdap) 10/02/1973    GLAUCOMA SCREENING Q2Y  10/02/2017    MEDICARE YEARLY EXAM  08/01/2018    Influenza Age 5 to Adult  08/01/2018     2 nd PNA vaccine due in two weeks. Pt will be getting that here or pharmacy.

## 2018-08-06 NOTE — PATIENT INSTRUCTIONS
Medicare Wellness Visit, Female    The best way to live healthy is to have a lifestyle where you eat a well-balanced diet, exercise regularly, limit alcohol use, and quit all forms of tobacco/nicotine, if applicable. Regular preventive services are another way to keep healthy. Preventive services (vaccines, screening tests, monitoring & exams) can help personalize your care plan, which helps you manage your own care. Screening tests can find health problems at the earliest stages, when they are easiest to treat. [facility] follows the current, evidence-based guidelines published by the [facility] ([facility]) when recommending preventive services for our patients. Because we follow these guidelines, sometimes recommendations change over time as research supports it. (For example, mammograms used to be recommended annually. Even though Medicare will still pay for an annual mammogram, the newer guidelines recommend a mammogram every two years for women of average risk.)    Of course, you and your provider may decide to screen more often for some diseases, based on your risk and co-morbidities (chronic disease you are already diagnosed with). Preventive services for you include:    - Medicare offers their members a free annual wellness visit, which is time for you and your primary care provider to discuss and plan for your preventive service needs. Take advantage of this benefit every year!    -All people over age 72 should receive the recommended pneumonia vaccines. Current USPSTF guidelines recommend a series of two vaccines for the best pneumonia protection.     -All adults should have a yearly flu vaccine and a tetanus vaccine every 10 years. All adults age 61 years should receive a shingles vaccine once in their lifetime.      -A bone mass density test is recommended when a woman turns 65 to screen for osteoporosis.  This test is only recommended once as a screening. Some providers will use this same test as a disease monitoring tool if you already have osteoporosis. -All adults age 38-68 years who are overweight should have a diabetes screening test once every three years.     -Other screening tests & preventive services for persons with diabetes include: an eye exam to screen for diabetic retinopathy, a kidney function test, a foot exam, and stricter control over your cholesterol.     -Cardiovascular screening for adults with routine risk involves an electrocardiogram (ECG) at intervals determined by the provider.     -Colorectal cancer screenings should be done for adults age 54-65 years with normal risk. There are a number of acceptable methods of screening for this type of cancer. Each test has its own benefits and drawbacks. Discuss with your provider what is most appropriate for you during your annual wellness visit. The different tests include: colonoscopy (considered the best screening method), a fecal occult blood test, a fecal DNA test, and sigmoidoscopy. -Breast cancer screenings are recommended every other year for women of normal risk age 54-69 years.     -Cervical cancer screenings for women over age 72 are only recommended with certain risk factors.     -All adults born between [facility] should be screened once for Hepatitis C. Here is a list of your current Health Maintenance items (your personalized list of preventive services) with a due date:  Health Maintenance Due   Topic Date Due    DTaP/Tdap/Td  (1 - Tdap) 10/02/1973    Glaucoma Screening   10/02/2017    Breast Cancer Screening  07/31/2018    Annual Well Visit  08/01/2018    Flu Vaccine  08/01/2018        DASH Diet: Care Instructions  Your Care Instructions    The DASH diet is an eating plan that can help lower your blood pressure. DASH stands for Dietary Approaches to Stop Hypertension. Hypertension is high blood pressure.   The DASH diet focuses on eating foods that are high in calcium, potassium, and magnesium. These nutrients can lower blood pressure. The foods that are highest in these nutrients are fruits, vegetables, low-fat dairy products, nuts, seeds, and legumes. But taking calcium, potassium, and magnesium supplements instead of eating foods that are high in those nutrients does not have the same effect. The DASH diet also includes whole grains, fish, and poultry. The DASH diet is one of several lifestyle changes your doctor may recommend to lower your high blood pressure. Your doctor may also want you to decrease the amount of sodium in your diet. Lowering sodium while following the DASH diet can lower blood pressure even further than just the DASH diet alone. Follow-up care is a key part of your treatment and safety. Be sure to make and go to all appointments, and call your doctor if you are having problems. It's also a good idea to know your test results and keep a list of the medicines you take. How can you care for yourself at home? Following the DASH diet  · Eat 4 to 5 servings of fruit each day. A serving is 1 medium-sized piece of fruit, ½ cup chopped or canned fruit, 1/4 cup dried fruit, or 4 ounces (½ cup) of fruit juice. Choose fruit more often than fruit juice. · Eat 4 to 5 servings of vegetables each day. A serving is 1 cup of lettuce or raw leafy vegetables, ½ cup of chopped or cooked vegetables, or 4 ounces (½ cup) of vegetable juice. Choose vegetables more often than vegetable juice. · Get 2 to 3 servings of low-fat and fat-free dairy each day. A serving is 8 ounces of milk, 1 cup of yogurt, or 1 ½ ounces of cheese. · Eat 6 to 8 servings of grains each day. A serving is 1 slice of bread, 1 ounce of dry cereal, or ½ cup of cooked rice, pasta, or cooked cereal. Try to choose whole-grain products as much as possible. · Limit lean meat, poultry, and fish to 2 servings each day. A serving is 3 ounces, about the size of a deck of cards.   · Eat 4 to 5 servings of nuts, seeds, and legumes (cooked dried beans, lentils, and split peas) each week. A serving is 1/3 cup of nuts, 2 tablespoons of seeds, or ½ cup of cooked beans or peas. · Limit fats and oils to 2 to 3 servings each day. A serving is 1 teaspoon of vegetable oil or 2 tablespoons of salad dressing. · Limit sweets and added sugars to 5 servings or less a week. A serving is 1 tablespoon jelly or jam, ½ cup sorbet, or 1 cup of lemonade. · Eat less than 2,300 milligrams (mg) of sodium a day. If you limit your sodium to 1,500 mg a day, you can lower your blood pressure even more. Tips for success  · Start small. Do not try to make dramatic changes to your diet all at once. You might feel that you are missing out on your favorite foods and then be more likely to not follow the plan. Make small changes, and stick with them. Once those changes become habit, add a few more changes. · Try some of the following:  ¨ Make it a goal to eat a fruit or vegetable at every meal and at snacks. This will make it easy to get the recommended amount of fruits and vegetables each day. ¨ Try yogurt topped with fruit and nuts for a snack or healthy dessert. ¨ Add lettuce, tomato, cucumber, and onion to sandwiches. ¨ Combine a ready-made pizza crust with low-fat mozzarella cheese and lots of vegetable toppings. Try using tomatoes, squash, spinach, broccoli, carrots, cauliflower, and onions. ¨ Have a variety of cut-up vegetables with a low-fat dip as an appetizer instead of chips and dip. ¨ Sprinkle sunflower seeds or chopped almonds over salads. Or try adding chopped walnuts or almonds to cooked vegetables. ¨ Try some vegetarian meals using beans and peas. Add garbanzo or kidney beans to salads. Make burritos and tacos with mashed pineda beans or black beans. Where can you learn more? Go to http://ankur-sam.info/. Enter T786 in the search box to learn more about \"DASH Diet: Care Instructions. \"  Current as of: December 6, 2017  Content Version: 11.7  © 5341-1373 [employer]. Care instructions adapted under license by [employer] (which disclaims liability or warranty for this information). If you have questions about a medical condition or this instruction, always ask your healthcare professional. [address] any warranty or liability for your use of this information. Well Visit, Over 72: Care Instructions  Your Care Instructions    Physical exams can help you stay healthy. Your doctor has checked your overall health and may have suggested ways to take good care of yourself. He or she also may have recommended tests. At home, you can help prevent illness with healthy eating, regular exercise, and other steps. Follow-up care is a key part of your treatment and safety. Be sure to make and go to all appointments, and call your doctor if you are having problems. It's also a good idea to know your test results and keep a list of the medicines you take. How can you care for yourself at home? · Reach and stay at a healthy weight. This will lower your risk for many problems, such as obesity, diabetes, heart disease, and high blood pressure. · Get at least 30 minutes of exercise on most days of the week. Walking is a good choice. You also may want to do other activities, such as running, swimming, cycling, or playing tennis or team sports. · Do not smoke. Smoking can make health problems worse. If you need help quitting, talk to your doctor about stop-smoking programs and medicines. These can increase your chances of quitting for good. · Protect your skin from too much sun. When you're outdoors from 10 a.m. to 4 p.m., stay in the shade or cover up with clothing and a hat with a wide brim. Wear sunglasses that block UV rays. Even when it's cloudy, put broad-spectrum sunscreen (SPF 30 or higher) on any exposed skin.   · See a dentist one or two times a year for checkups and to have your teeth cleaned. · Wear a seat belt in the car. · Limit alcohol to 2 drinks a day for men and 1 drink a day for women. Too much alcohol can cause health problems. Follow your doctor's advice about when to have certain tests. These tests can spot problems early. For men and women  · Cholesterol. Your doctor will tell you how often to have this done based on your overall health and other things that can increase your risk for heart attack and stroke. · Blood pressure. Have your blood pressure checked during a routine doctor visit. Your doctor will tell you how often to check your blood pressure based on your age, your blood pressure results, and other factors. · Diabetes. Ask your doctor whether you should have tests for diabetes. · Vision. Experts recommend that you have yearly exams for glaucoma and other age-related eye problems. · Hearing. Tell your doctor if you notice any change in your hearing. You can have tests to find out how well you hear. · Colon cancer tests. Keep having colon cancer tests as your doctor recommends. You can have one of several types of tests. · Heart attack and stroke risk. At least every 4 to 6 years, you should have your risk for heart attack and stroke assessed. Your doctor uses factors such as your age, blood pressure, cholesterol, and whether you smoke or have diabetes to show what your risk for a heart attack or stroke is over the next 10 years. · Osteoporosis. Talk to your doctor about whether you should have a bone density test to find out whether you have thinning bones. Also ask your doctor about whether you should take calcium and vitamin D supplements. For women  · Pap test and pelvic exam. You may no longer need a Pap test. Talk with your doctor about whether to stop or continue to have Pap tests. · Breast exam and mammogram. Ask how often you should have a mammogram, which is an X-ray of your breasts.  A mammogram can spot breast cancer before it can be felt and when it is easiest to treat. · Thyroid disease. Talk to your doctor about whether to have your thyroid checked as part of a regular physical exam. Women have an increased chance of a thyroid problem. For men  · Prostate exam. Talk to your doctor about whether you should have a blood test (called a PSA test) for prostate cancer. Experts disagree on whether men should have this test. Some experts recommend that you discuss the benefits and risks of the test with your doctor. · Abdominal aortic aneurysm. Ask your doctor whether you should have a test to check for an aneurysm. You may need a test if you ever smoked or if your parent, brother, sister, or child has had an aneurysm. When should you call for help? Watch closely for changes in your health, and be sure to contact your doctor if you have any problems or symptoms that concern you. Where can you learn more? Go to http://ankur-sam.info/. Enter W903 in the search box to learn more about \"Well Visit, Over 65: Care Instructions. \"  Current as of: May 16, 2017  Content Version: 11.7  © 3649-4273 [employer]. Care instructions adapted under license by [employer] (which disclaims liability or warranty for this information). If you have questions about a medical condition or this instruction, always ask your healthcare professional. [address] any warranty or liability for your use of this information.

## 2018-08-06 NOTE — MR AVS SNAPSHOT
[address] [place] [address]. Juliet Oates [address] [address] 
[phone] Patient: Rosemary Gee MRN: [id number] ZXR:[id number] Visit Information Date & Time Provider Department Dept. Phone Encounter #  
 8/6/2018  2:00 PM Ziggy Farmer MD [facility] Internal Medicine [phone] [id number] Follow-up Instructions Return in about 6 months (around 2/6/2019). Your Appointments 9/17/2018  3:00 PM  
ESTABLISHED PATIENT with Macie Marroquin MD  
[address] ([facility]) Appt Note: 3 month f/up; R/S  
 [address] [facility] [address]  
[phone]  
  
   
 [address] [facility] [address] Upcoming Health Maintenance Date Due DTaP/Tdap/Td series (1 - Tdap) 10/2/1973 GLAUCOMA SCREENING Q2Y 10/2/2017 MEDICARE YEARLY EXAM 8/1/2018 Influenza Age 5 to Adult 8/1/2018 BREAST CANCER SCRN MAMMOGRAM 9/6/2019* Pneumococcal 65+ Low/Medium Risk (1 of 2 - PCV13) 8/15/2018 COLONOSCOPY 7/31/2027 *Topic was postponed. The date shown is not the original due date. Allergies as of 8/6/2018  Review Complete On: 8/6/2018 By: Ziggy Farmer MD  
  
 Severity Noted Reaction Type Reactions Clindamycin  12/18/2017    Swelling, Other (comments) fever Humira [Adalimumab]  01/15/2018    Hives Current Immunizations  Reviewed on 8/6/2018 Name Date Influenza Vaccine 8/15/2017, 11/15/2013 12:00 AM, 9/24/2012 12:00 AM  
 Influenza Vaccine PF 9/21/2016 12:00 AM, 11/24/2014 12:00 AM  
 Pneumococcal Polysaccharide (PPSV-23) 8/15/2017 TB Skin Test (PPD) Intradermal 8/14/2017 Tdap  Incomplete Zoster Vaccine, Live 8/15/2017 Reviewed by Ziggy Farmer MD on 8/6/2018 at  2:19 PM  
You Were Diagnosed With   
  
 Codes Comments Medicare annual wellness visit, subsequent    -  Primary ICD-10-CM: Z00.00 ICD-9-CM: V70.0 Screening for alcoholism     ICD-10-CM: Z13.89 ICD-9-CM: V79.1 Screening for depression     ICD-10-CM: Z13.89 ICD-9-CM: V79.0 Encounter for screening mammogram for malignant neoplasm of breast     ICD-10-CM: Z12.31 
ICD-9-CM: V76.12 Encounter for immunization     ICD-10-CM: X32 ICD-9-CM: V03.89 Essential hypertension     ICD-10-CM: I10 
ICD-9-CM: 401.9 Other chronic pulmonary embolism without acute cor pulmonale (HCC)     ICD-10-CM: I27.82 
ICD-9-CM: 416.2 Vitamin D deficiency     ICD-10-CM: E55.9 ICD-9-CM: 268.9 CKD (chronic kidney disease) stage 2, GFR 60-89 ml/min     ICD-10-CM: N18.2 ICD-9-CM: 402. 2 Vitals BP Pulse Temp Resp Height(growth percentile) 130/77 (BP 1 Location: Left arm, BP Patient Position: Sitting) 71 97.1 °F (36.2 °C) (Temporal) 17 5' 6\" (1.676 m) Weight(growth percentile) SpO2 BMI OB Status Smoking Status 300 lb 3.2 oz (136.2 kg) 96% 48.45 kg/m2 Postmenopausal Never Smoker Vitals History BMI and BSA Data Body Mass Index Body Surface Area  
 48.45 kg/m 2 2.52 m 2 Preferred Pharmacy Pharmacy Name Phone [address] [facility] [address] [place] [address] [phone] Your Updated Medication List  
  
   
This list is accurate as of 8/6/18  2:50 PM.  Always use your most recent med list.  
  
  
  
  
 ergocalciferol 50,000 unit capsule Commonly known as:  ERGOCALCIFEROL Take 1 Cap by mouth every Wednesday. folic acid 1 mg tablet Commonly known as:  FOLVITE  
TAKE 1 TABLET EVERY DAY  
  
 furosemide 20 mg tablet Commonly known as:  LASIX Take 1 Tab by mouth daily as needed. IRON (FERROUS SULFATE) PO Take 65 mg by mouth four (4) times daily. methotrexate 2.5 mg tablet Commonly known as:  Cassi Dirk Take 8 Tabs by mouth every Wednesday for 90 days. metoprolol tartrate 50 mg tablet Commonly known as:  LOPRESSOR  
two (2) times a day. multivitamin tablet Commonly known as:  ONE A DAY Take 1 Tab by mouth daily. TYLENOL ARTHRITIS PAIN 650 mg Nataliia Delta Generic drug:  acetaminophen Take 650 mg by mouth as needed. XARELTO 20 mg Tab tablet Generic drug:  rivaroxaban Take 1 Tab by mouth daily (with breakfast). XELJANZ XR 11 mg Tb24 Generic drug:  tofacitinib We Performed the Following CBC WITH AUTOMATED DIFF [85086 CPT(R)] [address] [[id number] HCPCS] HEMOGLOBIN A1C WITH EAG [02334 CPT(R)] LIPID PANEL [20257 CPT(R)] GA ANNUAL ALCOHOL SCREEN 15 MIN [id number] HCPCS] TETANUS, DIPHTHERIA TOXOIDS AND ACELLULAR PERTUSSIS VACCINE (TDAP), IN INDIVIDS. >=7, IM [id number] CPT(R)] TSH AND FREE T4 [06679 CPT(R)] Follow-up Instructions Return in about 6 months (around 2/6/2019). To-Do List   
 08/09/2018 Imaging:  [facility] MAMMO BI SCREENING INCL CAD Patient Instructions Medicare Wellness Visit, Female The best way to live healthy is to have a lifestyle where you eat a well-balanced diet, exercise regularly, limit alcohol use, and quit all forms of tobacco/nicotine, if applicable. Regular preventive services are another way to keep healthy. Preventive services (vaccines, screening tests, monitoring & exams) can help personalize your care plan, which helps you manage your own care. Screening tests can find health problems at the earliest stages, when they are easiest to treat. [facility] follows the current, evidence-based guidelines published by the [facility] Craig Pryor (USPSTF) when recommending preventive services for our patients. Because we follow these guidelines, sometimes recommendations change over time as research supports it. (For example, mammograms used to be recommended annually.  Even though Medicare will still pay for an annual mammogram, the newer guidelines recommend a mammogram every two years for women of average risk.) Of course, you and your provider may decide to screen more often for some diseases, based on your risk and co-morbidities (chronic disease you are already diagnosed with). Preventive services for you include: - Medicare offers their members a free annual wellness visit, which is time for you and your primary care provider to discuss and plan for your preventive service needs. Take advantage of this benefit every year! 
 
-All people over age 72 should receive the recommended pneumonia vaccines. Current USPSTF guidelines recommend a series of two vaccines for the best pneumonia protection.  
 
-All adults should have a yearly flu vaccine and a tetanus vaccine every 10 years. All adults age 61 years should receive a shingles vaccine once in their lifetime.   
 
-A bone mass density test is recommended when a woman turns 65 to screen for osteoporosis. This test is only recommended once as a screening. Some providers will use this same test as a disease monitoring tool if you already have osteoporosis. -All adults age 38-68 years who are overweight should have a diabetes screening test once every three years.  
 
-Other screening tests & preventive services for persons with diabetes include: an eye exam to screen for diabetic retinopathy, a kidney function test, a foot exam, and stricter control over your cholesterol.  
 
-Cardiovascular screening for adults with routine risk involves an electrocardiogram (ECG) at intervals determined by the provider.  
 
-Colorectal cancer screenings should be done for adults age 54-65 years with normal risk. There are a number of acceptable methods of screening for this type of cancer. Each test has its own benefits and drawbacks. Discuss with your provider what is most appropriate for you during your annual wellness visit.  The different tests include: colonoscopy (considered the best screening method), a fecal occult blood test, a fecal DNA test, and sigmoidoscopy. -Breast cancer screenings are recommended every other year for women of normal risk age 54-69 years.  
 
-Cervical cancer screenings for women over age 72 are only recommended with certain risk factors.  
 
-All adults born between [facility] should be screened once for Hepatitis C. Here is a list of your current Health Maintenance items (your personalized list of preventive services) with a due date: 
Health Maintenance Due Topic Date Due  
 DTaP/Tdap/Td  (1 - Tdap) 10/02/1973  Glaucoma Screening   10/02/2017  Breast Cancer Screening  07/31/2018 [facility] Annual Well Visit  08/01/2018  Flu Vaccine  08/01/2018 DASH Diet: Care Instructions Your Care Instructions The DASH diet is an eating plan that can help lower your blood pressure. DASH stands for Dietary Approaches to Stop Hypertension. Hypertension is high blood pressure. The DASH diet focuses on eating foods that are high in calcium, potassium, and magnesium. These nutrients can lower blood pressure. The foods that are highest in these nutrients are fruits, vegetables, low-fat dairy products, nuts, seeds, and legumes. But taking calcium, potassium, and magnesium supplements instead of eating foods that are high in those nutrients does not have the same effect. The DASH diet also includes whole grains, fish, and poultry. The DASH diet is one of several lifestyle changes your doctor may recommend to lower your high blood pressure. Your doctor may also want you to decrease the amount of sodium in your diet. Lowering sodium while following the DASH diet can lower blood pressure even further than just the DASH diet alone. Follow-up care is a key part of your treatment and safety. Be sure to make and go to all appointments, and call your doctor if you are having problems.  It's also a good idea to know your test results and keep a list of the medicines you take. How can you care for yourself at home? Following the DASH diet · Eat 4 to 5 servings of fruit each day. A serving is 1 medium-sized piece of fruit, ½ cup chopped or canned fruit, 1/4 cup dried fruit, or 4 ounces (½ cup) of fruit juice. Choose fruit more often than fruit juice. · Eat 4 to 5 servings of vegetables each day. A serving is 1 cup of lettuce or raw leafy vegetables, ½ cup of chopped or cooked vegetables, or 4 ounces (½ cup) of vegetable juice. Choose vegetables more often than vegetable juice. · Get 2 to 3 servings of low-fat and fat-free dairy each day. A serving is 8 ounces of milk, 1 cup of yogurt, or 1 ½ ounces of cheese. · Eat 6 to 8 servings of grains each day. A serving is 1 slice of bread, 1 ounce of dry cereal, or ½ cup of cooked rice, pasta, or cooked cereal. Try to choose whole-grain products as much as possible. · Limit lean meat, poultry, and fish to 2 servings each day. A serving is 3 ounces, about the size of a deck of cards. · Eat 4 to 5 servings of nuts, seeds, and legumes (cooked dried beans, lentils, and split peas) each week. A serving is 1/3 cup of nuts, 2 tablespoons of seeds, or ½ cup of cooked beans or peas. · Limit fats and oils to 2 to 3 servings each day. A serving is 1 teaspoon of vegetable oil or 2 tablespoons of salad dressing. · Limit sweets and added sugars to 5 servings or less a week. A serving is 1 tablespoon jelly or jam, ½ cup sorbet, or 1 cup of lemonade. · Eat less than 2,300 milligrams (mg) of sodium a day. If you limit your sodium to 1,500 mg a day, you can lower your blood pressure even more. Tips for success · Start small. Do not try to make dramatic changes to your diet all at once. You might feel that you are missing out on your favorite foods and then be more likely to not follow the plan. Make small changes, and stick with them. Once those changes become habit, add a few more changes. · Try some of the following: ¨ Make it a goal to eat a fruit or vegetable at every meal and at snacks. This will make it easy to get the recommended amount of fruits and vegetables each day. ¨ Try yogurt topped with fruit and nuts for a snack or healthy dessert. ¨ Add lettuce, tomato, cucumber, and onion to sandwiches. ¨ Combine a ready-made pizza crust with low-fat mozzarella cheese and lots of vegetable toppings. Try using tomatoes, squash, spinach, broccoli, carrots, cauliflower, and onions. ¨ Have a variety of cut-up vegetables with a low-fat dip as an appetizer instead of chips and dip. ¨ Sprinkle sunflower seeds or chopped almonds over salads. Or try adding chopped walnuts or almonds to cooked vegetables. ¨ Try some vegetarian meals using beans and peas. Add garbanzo or kidney beans to salads. Make burritos and tacos with mashed pineda beans or black beans. Where can you learn more? Go to http://ankurGobblersam.info/. Enter Y828 in the search box to learn more about \"DASH Diet: Care Instructions. \" Current as of: December 6, 2017 Content Version: 11.7 © 5931-7322 [employer], [employer]. Care instructions adapted under license by [employer] (which disclaims liability or warranty for this information). If you have questions about a medical condition or this instruction, always ask your healthcare professional. [place] [address] any warranty or liability for your use of this information. Well Visit, Over 72: Care Instructions Your Care Instructions Physical exams can help you stay healthy. Your doctor has checked your overall health and may have suggested ways to take good care of yourself. He or she also may have recommended tests. At home, you can help prevent illness with healthy eating, regular exercise, and other steps. Follow-up care is a key part of your treatment and safety.  Be sure to make and go to all appointments, and call your doctor if you are having problems. It's also a good idea to know your test results and keep a list of the medicines you take. How can you care for yourself at home? · Reach and stay at a healthy weight. This will lower your risk for many problems, such as obesity, diabetes, heart disease, and high blood pressure. · Get at least 30 minutes of exercise on most days of the week. Walking is a good choice. You also may want to do other activities, such as running, swimming, cycling, or playing tennis or team sports. · Do not smoke. Smoking can make health problems worse. If you need help quitting, talk to your doctor about stop-smoking programs and medicines. These can increase your chances of quitting for good. · Protect your skin from too much sun. When you're outdoors from 10 a.m. to 4 p.m., stay in the shade or cover up with clothing and a hat with a wide brim. Wear sunglasses that block UV rays. Even when it's cloudy, put broad-spectrum sunscreen (SPF 30 or higher) on any exposed skin. · See a dentist one or two times a year for checkups and to have your teeth cleaned. · Wear a seat belt in the car. · Limit alcohol to 2 drinks a day for men and 1 drink a day for women. Too much alcohol can cause health problems. Follow your doctor's advice about when to have certain tests. These tests can spot problems early. For men and women · Cholesterol. Your doctor will tell you how often to have this done based on your overall health and other things that can increase your risk for heart attack and stroke. · Blood pressure. Have your blood pressure checked during a routine doctor visit. Your doctor will tell you how often to check your blood pressure based on your age, your blood pressure results, and other factors. · Diabetes. Ask your doctor whether you should have tests for diabetes. · Vision. Experts recommend that you have yearly exams for glaucoma and other age-related eye problems. · Hearing. Tell your doctor if you notice any change in your hearing. You can have tests to find out how well you hear. · Colon cancer tests. Keep having colon cancer tests as your doctor recommends. You can have one of several types of tests. · Heart attack and stroke risk. At least every 4 to 6 years, you should have your risk for heart attack and stroke assessed. Your doctor uses factors such as your age, blood pressure, cholesterol, and whether you smoke or have diabetes to show what your risk for a heart attack or stroke is over the next 10 years. · Osteoporosis. Talk to your doctor about whether you should have a bone density test to find out whether you have thinning bones. Also ask your doctor about whether you should take calcium and vitamin D supplements. For women · Pap test and pelvic exam. You may no longer need a Pap test. Talk with your doctor about whether to stop or continue to have Pap tests. · Breast exam and mammogram. Ask how often you should have a mammogram, which is an X-ray of your breasts. A mammogram can spot breast cancer before it can be felt and when it is easiest to treat. · Thyroid disease. Talk to your doctor about whether to have your thyroid checked as part of a regular physical exam. Women have an increased chance of a thyroid problem. For men · Prostate exam. Talk to your doctor about whether you should have a blood test (called a PSA test) for prostate cancer. Experts disagree on whether men should have this test. Some experts recommend that you discuss the benefits and risks of the test with your doctor. · Abdominal aortic aneurysm. Ask your doctor whether you should have a test to check for an aneurysm. You may need a test if you ever smoked or if your parent, brother, sister, or child has had an aneurysm. When should you call for help?  
Watch closely for changes in your health, and be sure to contact your doctor if you have any problems or symptoms that concern you. Where can you learn more? Go to http://ankur-sam.info/. Enter V702 in the search box to learn more about \"Well Visit, Over 65: Care Instructions. \" Current as of: May 16, 2017 Content Version: 11.7 © 5177-9478 [employer]. Care instructions adapted under license by [employer] (which disclaims liability or warranty for this information). If you have questions about a medical condition or this instruction, always ask your healthcare professional. [address] any warranty or liability for your use of this information. Introducing [address]! [employer] introduces [employer]ote patient portal. Now you can access parts of your medical record, email your doctor's office, and request medication refills online. 1. In your internet browser, go to [email or web address]. [email or web address]/Gift Card Combo 2. Click on the First Time User? Click Here link in the Sign In box. You will see the New Member Sign Up page. 3. Enter your Estimote Access Code exactly as it appears below. You will not need to use this code after youve completed the sign-up process. If you do not sign up before the expiration date, you must request a new code. · Estimote Access Code: [email or web address] Expires: 11/4/2018  2:50 PM 
 
4. Enter the last four digits of your Social Security Number (xxxx) and Date of Birth (mm/dd/yyyy) as indicated and click Submit. You will be taken to the next sign-up page. 5. Create a eflowt ID. This will be your Estimote login ID and cannot be changed, so think of one that is secure and easy to remember. 6. Create a Estimote password. You can change your password at any time. 7. Enter your Password Reset Question and Answer. This can be used at a later time if you forget your password. 8. Enter your e-mail address.  You will receive e-mail notification when new information is available in [employer]. 9. Click Sign Up. You can now view and download portions of your medical record. 10. Click the Download Summary menu link to download a portable copy of your medical information. If you have questions, please visit the Frequently Asked Questions section of the [employer] website. Remember, [employer] is NOT to be used for urgent needs. For medical emergencies, dial 911. Now available from your iPhone and Android! Please provide this summary of care documentation to your next provider. Your primary care clinician is listed as Daniel Donohue. If you have any questions after today's visit, please call [phone].

## 2018-08-07 LAB
BASOPHILS # BLD AUTO: 0 X10E3/UL (ref 0–0.2)
BASOPHILS NFR BLD AUTO: 1 %
CHOLEST SERPL-MCNC: 191 MG/DL (ref 100–199)
EOSINOPHIL # BLD AUTO: 0 X10E3/UL (ref 0–0.4)
EOSINOPHIL NFR BLD AUTO: 1 %
ERYTHROCYTE [DISTWIDTH] IN BLOOD BY AUTOMATED COUNT: 14.4 % (ref 12.3–15.4)
EST. AVERAGE GLUCOSE BLD GHB EST-MCNC: 100 MG/DL
HBA1C MFR BLD: 5.1 % (ref 4.8–5.6)
HCT VFR BLD AUTO: 32.5 % (ref 34–46.6)
HDLC SERPL-MCNC: 62 MG/DL
HGB BLD-MCNC: 10.7 G/DL (ref 11.1–15.9)
IMM GRANULOCYTES # BLD: 0 X10E3/UL (ref 0–0.1)
IMM GRANULOCYTES NFR BLD: 0 %
INTERPRETATION, 910389: NORMAL
LDLC SERPL CALC-MCNC: 106 MG/DL (ref 0–99)
LYMPHOCYTES # BLD AUTO: 0.8 X10E3/UL (ref 0.7–3.1)
LYMPHOCYTES NFR BLD AUTO: 28 %
MCH RBC QN AUTO: 31.5 PG (ref 26.6–33)
MCHC RBC AUTO-ENTMCNC: 32.9 G/DL (ref 31.5–35.7)
MCV RBC AUTO: 96 FL (ref 79–97)
MONOCYTES # BLD AUTO: 0.2 X10E3/UL (ref 0.1–0.9)
MONOCYTES NFR BLD AUTO: 7 %
NEUTROPHILS # BLD AUTO: 1.9 X10E3/UL (ref 1.4–7)
NEUTROPHILS NFR BLD AUTO: 63 %
PLATELET # BLD AUTO: 247 X10E3/UL (ref 150–379)
RBC # BLD AUTO: 3.4 X10E6/UL (ref 3.77–5.28)
T4 FREE SERPL-MCNC: 0.98 NG/DL (ref 0.82–1.77)
TRIGL SERPL-MCNC: 113 MG/DL (ref 0–149)
TSH SERPL DL<=0.005 MIU/L-ACNC: 1.37 UIU/ML (ref 0.45–4.5)
VLDLC SERPL CALC-MCNC: 23 MG/DL (ref 5–40)
WBC # BLD AUTO: 3.1 X10E3/UL (ref 3.4–10.8)

## 2018-08-09 RX ORDER — FOLIC ACID 1 MG/1
TABLET ORAL
Qty: 90 TAB | Refills: 0 | Status: SHIPPED | OUTPATIENT
Start: 2018-08-09 | End: 2018-12-17 | Stop reason: SDUPTHER

## 2018-09-17 ENCOUNTER — OFFICE VISIT (OUTPATIENT)
Dept: RHEUMATOLOGY | Age: 68
End: 2018-09-17

## 2018-09-17 VITALS
HEART RATE: 84 BPM | TEMPERATURE: 98.6 F | DIASTOLIC BLOOD PRESSURE: 79 MMHG | SYSTOLIC BLOOD PRESSURE: 134 MMHG | WEIGHT: 293 LBS | HEIGHT: 66 IN | BODY MASS INDEX: 47.09 KG/M2 | RESPIRATION RATE: 20 BRPM

## 2018-09-17 DIAGNOSIS — M54.9 CHRONIC BACK PAIN GREATER THAN 3 MONTHS DURATION: ICD-10-CM

## 2018-09-17 DIAGNOSIS — G89.29 CHRONIC BACK PAIN GREATER THAN 3 MONTHS DURATION: ICD-10-CM

## 2018-09-17 DIAGNOSIS — E55.9 VITAMIN D DEFICIENCY: ICD-10-CM

## 2018-09-17 DIAGNOSIS — M05.79 SEROPOSITIVE RHEUMATOID ARTHRITIS OF MULTIPLE SITES (HCC): Primary | ICD-10-CM

## 2018-09-17 DIAGNOSIS — Z79.60 LONG-TERM USE OF IMMUNOSUPPRESSANT MEDICATION: ICD-10-CM

## 2018-09-17 RX ORDER — METHOTREXATE 2.5 MG/1
20 TABLET ORAL
Qty: 96 TAB | Refills: 0 | Status: SHIPPED | OUTPATIENT
Start: 2018-09-19 | End: 2018-12-17 | Stop reason: ALTCHOICE

## 2018-09-17 NOTE — MR AVS SNAPSHOT
[address] [facility] [address] 
[phone] Patient: Torito Lucas MRN: [id number] Visit Information Date & Time Provider Department Dept. Phone Encounter #  
 9/17/2018  3:00 PM Ana Delaney Joshua Karolyn [phone] [id number] Follow-up Instructions Return in about 3 months (around 12/17/2018). Upcoming Health Maintenance Date Due  
 GLAUCOMA SCREENING Q2Y 10/2/2017 Influenza Age 5 to Adult 8/1/2018 Pneumococcal 65+ Low/Medium Risk (1 of 2 - PCV13) 8/15/2018 BREAST CANCER SCRN MAMMOGRAM 9/6/2019* MEDICARE YEARLY EXAM 8/7/2019 COLONOSCOPY 7/31/2027 DTaP/Tdap/Td series (2 - Td) 8/6/2028 *Topic was postponed. The date shown is not the original due date. Allergies as of 9/17/2018  Review Complete On: 9/17/2018 By: Ana Delaney MD  
  
 Severity Noted Reaction Type Reactions Clindamycin  12/18/2017    Swelling, Other (comments) fever Humira [Adalimumab]  01/15/2018    Hives Current Immunizations  Reviewed on 9/17/2018 Name Date Influenza Vaccine 8/15/2017, 11/15/2013 12:00 AM, 9/24/2012 12:00 AM  
 Influenza Vaccine PF 9/21/2016 12:00 AM, 11/24/2014 12:00 AM  
 Pneumococcal Conjugate (PCV-13) 8/28/2018 Pneumococcal Polysaccharide (PPSV-23) 8/15/2017 TB Skin Test (PPD) Intradermal 8/14/2017 Tdap 8/6/2018 Zoster Vaccine, Live 8/15/2017 Reviewed by Cleveland Saenz RN on 9/17/2018 at  3:04 PM  
You Were Diagnosed With   
  
 Codes Comments Seropositive rheumatoid arthritis of multiple sites [facility])    -  Primary ICD-10-CM: M05.79 ICD-9-CM: 714.0 Long-term use of immunosuppressant medication     ICD-10-CM: Z79.899 ICD-9-CM: V58.69 Vitamin D deficiency     ICD-10-CM: E55.9 ICD-9-CM: 268.9 Vitals BP Pulse Temp Resp Height(growth percentile) Weight(growth percentile) 134/79 84 98.6 °F (37 °C) 20 5' 6\" (1.676 m) 298 lb (135.2 kg) BMI OB Status Smoking Status 48.1 kg/m2 Postmenopausal Never Smoker Vitals History BMI and BSA Data Body Mass Index Body Surface Area  
 48.1 kg/m 2 2.51 m 2 Preferred Pharmacy Pharmacy Name Phone [facility] [place] [id number] [phone] Your Updated Medication List  
  
   
This list is accurate as of 9/17/18  3:26 PM.  Always use your most recent med list.  
  
  
  
  
 folic acid 1 mg tablet Commonly known as:  FOLVITE  
TAKE 1 TABLET EVERY DAY  
  
 IRON (FERROUS SULFATE) PO Take 65 mg by mouth four (4) times daily. methotrexate 2.5 mg tablet Commonly known as:  Sana Hoof Take 8 Tabs by mouth every Wednesday for 90 days. Start taking on:  9/19/2018  
  
 metoprolol tartrate 50 mg tablet Commonly known as:  LOPRESSOR  
two (2) times a day. multivitamin tablet Commonly known as:  ONE A DAY Take 1 Tab by mouth daily. TYLENOL ARTHRITIS PAIN 650 mg Sharlette Cleveland Generic drug:  acetaminophen Take 650 mg by mouth as needed. XARELTO 20 mg Tab tablet Generic drug:  rivaroxaban Take 1 Tab by mouth daily (with breakfast). Prescriptions Sent to Pharmacy Refills  
 methotrexate (RHEUMATREX) 2.5 mg tablet 0 Starting on: 9/19/2018 Sig: Take 8 Tabs by mouth every Wednesday for 90 days. Class: Normal  
 Pharmacy: [address] [place] [address], [address] [place] [address] Ph #: [phone] Route: Oral  
  
We Performed the Following C REACTIVE PROTEIN, QT [60771 CPT(R)] CBC WITH AUTOMATED DIFF [87896 CPT(R)] CHRONIC HEPATITIS PANEL [MSE7909 Custom] METABOLIC PANEL, COMPREHENSIVE [48083 CPT(R)] QUANTIFERON-TB GOLD PLUS [id number] Custom] SED RATE (ESR) [id number] CPT(R)] Follow-up Instructions Return in about 3 months (around 12/17/2018). Patient Instructions Stop Lee Ann Herrera. Continue methotrexate and folic Rituximab (By injection) Rituximab (kr-BSN-s-mab) Treats rheumatoid arthritis (RA), Wegener granulomatosis, and microscopic polyangiitis (MPA). Also treats cancer, including lymphoma and leukemia. Brand Name(s): Rituxan There may be other brand names for this medicine. When This Medicine Should Not Be Used: You should not receive this medicine if you have had an allergic reaction to rituximab or other murine (mice or rat) proteins. How to Use This Medicine:  
Injectable · Medicines used to treat cancer are very strong and can have many side effects. Before receiving this medicine, make sure you understand all the risks and benefits. It is important for you to work closely with your doctor during your treatment. · You will receive this medicine while you are in a hospital or cancer treatment center. A nurse or other trained health professional will give you this medicine. · Your doctor will prescribe your dose and schedule. This medicine is given through a needle placed in a vein. · Rituximab must be given slowly, so the needle will remain in place for a few hours. You may also receive medicines (such as acetaminophen, antihistamines) to help prevent possible allergic reactions to the injection. · You will be watched closely for unwanted side effects while you are receiving this medicine. · This medicine should come with a Medication Guide. Ask your pharmacist for a copy if you do not have one. If a dose is missed:  
· Call your doctor or pharmacist for instructions. Drugs and Foods to Avoid: Ask your doctor or pharmacist before using any other medicine, including over-the-counter medicines, vitamins, and herbal products. · Make sure your doctor knows if you are also receiving cisplatin (Zuleika Mcdonnell Ultramar 112).  Tell your doctor if you have taken other medicines to treat rheumatoid arthritis such as adalimumab (Humira®), etanercept (Enbrel®), or infliximab (Remicade®). · This medicine may interfere with vaccines. Ask your doctor before you get a flu shot or any other vaccines. Some vaccines may be given 4 weeks before a rituximab injection. Warnings While Using This Medicine: · Make sure your doctor knows if you are pregnant or planning to become pregnant. You must use an effective form of birth control to prevent pregnancy. You should not become pregnant while you are receiving this medicine and for 12 months after stopping it. · Make sure your doctor knows if you are breastfeeding, or if you have kidney disease, liver disease (including hepatitis B), chest pain (angina), heart disease, heart rhythm problems, or lung problems. Also tell your doctor if you have a weak immune system or any type of infection. · Tell your doctor if you have had a reaction to murine (mice or rat) proteins. Murine proteins are also used in other medicines. · Rituximab may cause a serious side effect called an infusion reaction. This can be life-threatening and requires immediate medical attention. Check with your doctor or nurse right away if you have a rash, itching, swelling of the face, tongue, and throat, trouble breathing, or chest pain. · This medicine may cause a serious reaction called tumor lysis syndrome. Call your doctor right away if you have changes in how often you urinate, rapid weight gain, swelling of the feet or lower legs, uneven heartbeat, or seizures. · If you have a severe skin reaction, tell your doctor right away. Symptoms may include blistering, peeling, or loosening of the skin, red skin lesions, severe acne or skin rash, sores or ulcers on the skin, or fever or chills. · This medicine may increase your risk of infections during treatment and up to a year after treatment.  These infections can be severe and lead to death. Avoid being near people who are sick. Wash your hands often. Tell your doctor if you have lupus or have ever had an infection that kept coming back. · Call your doctor right away if you have a cough that won't go away, weight loss, night sweats, fever, chills, flu-like symptoms (such as a runny or stuffy nose, headache, blurred vision, or feeling generally ill), painful or difficult urination, or sores, ulcers, or white spots in the mouth or on the lips. These may be signs of infection. · This medicine may cause a rare and serious brain infection called progressive multifocal leukoencephalopathy (PML). The risk for getting this infection is higher if you have rheumatoid arthritis. Check with your doctor right away if you have more than one of these symptoms: vision changes, loss of coordination, clumsiness, memory loss, difficulty speaking or understanding what others say, and weakness in the legs. · Check with your doctor right away if you have any symptoms of liver problems, including yellow skin and eyes, dark urine or pale stools, nausea and vomiting, or upper stomach pain. · Check with your doctor right away if you have abdominal pain. This medicine could cause serious stomach and bowel problems, such as a bowel obstruction or a hole in your bowel. · Your doctor will do lab tests at regular visits to check on the effects of this medicine. Keep all appointments. Possible Side Effects While Using This Medicine:  
Call your doctor right away if you notice any of these side effects: · Allergic reaction: Itching or hives, swelling in your face or hands, swelling or tingling in your mouth or throat, chest tightness, trouble breathing · Blistering, peeling, red skin rash · Bloody vomit or vomit that looks like coffee grounds, severe stomach pain · Changes in vision · Chest pain, uneven heartbeat, sudden fainting · Confusion, body weakness, shortness of breath, numbness or tingling in your hands, feet, or lips · Dark-colored urine or pale stools, nausea, vomiting, loss of appetite, pain in your upper stomach, yellow eyes or skin · Decrease in how much or how often you urinate, pain while urinating · Dizziness, lightheadedness, drowsiness · Fever, chills, cough, sore throat, and body aches · Joint pain, stiffness, or swelling · Problems with coordination or speech · Rapid weight gain, swelling in your hands, ankles, or feet · Sores, ulcers, or white spots in the mouth or on the lips · Unusual bleeding or bruising · Unusual tiredness or weakness If you notice these less serious side effects, talk with your doctor:  
· Headache · Mild skin rash or itching · Pain, itching, burning, swelling, or a lump under your skin where the needle is placed If you notice other side effects that you think are caused by this medicine, tell your doctor. Call your doctor for medical advice about side effects. You may report side effects to FDA at 1-908-JUP-9316 © 2017 [facility] Information is for End User's use only and may not be sold, redistributed or otherwise used for commercial purposes. The above information is an  only. It is not intended as medical advice for individual conditions or treatments. Talk to your doctor, nurse or pharmacist before following any medical regimen to see if it is safe and effective for you. Introducing [facility]! Meli Flood introduces [employer] patient portal. Now you can access parts of your medical record, email your doctor's office, and request medication refills online. 1. In your internet browser, go to [email or web address] 2. Click on the First Time User? Click Here link in the Sign In box. You will see the New Member Sign Up page. 3. Enter your [employer] Access Code exactly as it appears below. You will not need to use this code after youve completed the sign-up process.  If you do not sign up before the expiration date, you must request a new code. · [employer] Access Code: [email or web address] Expires: 11/4/2018  2:50 PM 
 
4. Enter the last four digits of your Social Security Number (xxxx) and Date of Birth (mm/dd/yyyy) as indicated and click Submit. You will be taken to the next sign-up page. 5. Create a Shoppilot ID. This will be your Shoppilot login ID and cannot be changed, so think of one that is secure and easy to remember. 6. Create a [employer] password. You can change your password at any time. 7. Enter your Password Reset Question and Answer. This can be used at a later time if you forget your password. 8. Enter your e-mail address. You will receive e-mail notification when new information is available in [address] 9. Click Sign Up. You can now view and download portions of your medical record. 10. Click the Download Summary menu link to download a portable copy of your medical information. If you have questions, please visit the Frequently Asked Questions section of the [employer] website. Remember, [employer] is NOT to be used for urgent needs. For medical emergencies, dial 911. Now available from your iPhone and Android! Please provide this summary of care documentation to your next provider. Your primary care clinician is listed as Daniel Donohue. If you have any questions after today's visit, please call [phone].

## 2018-09-17 NOTE — PATIENT INSTRUCTIONS
Stop Miguel Rodriguez. Continue methotrexate and folic    Rituximab (By injection)   Rituximab (nx-MAM-n-mab)  Treats rheumatoid arthritis (RA), Wegener granulomatosis, and microscopic polyangiitis (MPA). Also treats cancer, including lymphoma and leukemia. Brand Name(s): Rituxan   There may be other brand names for this medicine. When This Medicine Should Not Be Used: You should not receive this medicine if you have had an allergic reaction to rituximab or other murine (mice or rat) proteins. How to Use This Medicine:   Injectable  · Medicines used to treat cancer are very strong and can have many side effects. Before receiving this medicine, make sure you understand all the risks and benefits. It is important for you to work closely with your doctor during your treatment. · You will receive this medicine while you are in a hospital or cancer treatment center. A nurse or other trained health professional will give you this medicine. · Your doctor will prescribe your dose and schedule. This medicine is given through a needle placed in a vein. · Rituximab must be given slowly, so the needle will remain in place for a few hours. You may also receive medicines (such as acetaminophen, antihistamines) to help prevent possible allergic reactions to the injection. · You will be watched closely for unwanted side effects while you are receiving this medicine. · This medicine should come with a Medication Guide. Ask your pharmacist for a copy if you do not have one. If a dose is missed:   · Call your doctor or pharmacist for instructions. Drugs and Foods to Avoid:   Ask your doctor or pharmacist before using any other medicine, including over-the-counter medicines, vitamins, and herbal products. · Make sure your doctor knows if you are also receiving cisplatin (Zuleika Mcdonnell Ultramar 112).  Tell your doctor if you have taken other medicines to treat rheumatoid arthritis such as adalimumab (Humira®), etanercept (Enbrel®), or infliximab (Remicade®). · This medicine may interfere with vaccines. Ask your doctor before you get a flu shot or any other vaccines. Some vaccines may be given 4 weeks before a rituximab injection. Warnings While Using This Medicine:   · Make sure your doctor knows if you are pregnant or planning to become pregnant. You must use an effective form of birth control to prevent pregnancy. You should not become pregnant while you are receiving this medicine and for 12 months after stopping it. · Make sure your doctor knows if you are breastfeeding, or if you have kidney disease, liver disease (including hepatitis B), chest pain (angina), heart disease, heart rhythm problems, or lung problems. Also tell your doctor if you have a weak immune system or any type of infection. · Tell your doctor if you have had a reaction to murine (mice or rat) proteins. Murine proteins are also used in other medicines. · Rituximab may cause a serious side effect called an infusion reaction. This can be life-threatening and requires immediate medical attention. Check with your doctor or nurse right away if you have a rash, itching, swelling of the face, tongue, and throat, trouble breathing, or chest pain. · This medicine may cause a serious reaction called tumor lysis syndrome. Call your doctor right away if you have changes in how often you urinate, rapid weight gain, swelling of the feet or lower legs, uneven heartbeat, or seizures. · If you have a severe skin reaction, tell your doctor right away. Symptoms may include blistering, peeling, or loosening of the skin, red skin lesions, severe acne or skin rash, sores or ulcers on the skin, or fever or chills. · This medicine may increase your risk of infections during treatment and up to a year after treatment. These infections can be severe and lead to death. Avoid being near people who are sick. Wash your hands often.  Tell your doctor if you have lupus or have ever had an infection that kept coming back. · Call your doctor right away if you have a cough that won't go away, weight loss, night sweats, fever, chills, flu-like symptoms (such as a runny or stuffy nose, headache, blurred vision, or feeling generally ill), painful or difficult urination, or sores, ulcers, or white spots in the mouth or on the lips. These may be signs of infection. · This medicine may cause a rare and serious brain infection called progressive multifocal leukoencephalopathy (PML). The risk for getting this infection is higher if you have rheumatoid arthritis. Check with your doctor right away if you have more than one of these symptoms: vision changes, loss of coordination, clumsiness, memory loss, difficulty speaking or understanding what others say, and weakness in the legs. · Check with your doctor right away if you have any symptoms of liver problems, including yellow skin and eyes, dark urine or pale stools, nausea and vomiting, or upper stomach pain. · Check with your doctor right away if you have abdominal pain. This medicine could cause serious stomach and bowel problems, such as a bowel obstruction or a hole in your bowel. · Your doctor will do lab tests at regular visits to check on the effects of this medicine. Keep all appointments.   Possible Side Effects While Using This Medicine:   Call your doctor right away if you notice any of these side effects:  · Allergic reaction: Itching or hives, swelling in your face or hands, swelling or tingling in your mouth or throat, chest tightness, trouble breathing  · Blistering, peeling, red skin rash  · Bloody vomit or vomit that looks like coffee grounds, severe stomach pain  · Changes in vision  · Chest pain, uneven heartbeat, sudden fainting  · Confusion, body weakness, shortness of breath, numbness or tingling in your hands, feet, or lips  · Dark-colored urine or pale stools, nausea, vomiting, loss of appetite, pain in your upper stomach, yellow eyes or skin  · Decrease in how much or how often you urinate, pain while urinating  · Dizziness, lightheadedness, drowsiness  · Fever, chills, cough, sore throat, and body aches  · Joint pain, stiffness, or swelling  · Problems with coordination or speech  · Rapid weight gain, swelling in your hands, ankles, or feet  · Sores, ulcers, or white spots in the mouth or on the lips  · Unusual bleeding or bruising  · Unusual tiredness or weakness  If you notice these less serious side effects, talk with your doctor:   · Headache  · Mild skin rash or itching  · Pain, itching, burning, swelling, or a lump under your skin where the needle is placed  If you notice other side effects that you think are caused by this medicine, tell your doctor. Call your doctor for medical advice about side effects. You may report side effects to FDA at 8-897-FDA-7647  © 2017 [facility] Information is for End User's use only and may not be sold, redistributed or otherwise used for commercial purposes. The above information is an  only. It is not intended as medical advice for individual conditions or treatments. Talk to your doctor, nurse or pharmacist before following any medical regimen to see if it is safe and effective for you.

## 2018-09-17 NOTE — PROGRESS NOTES
REASON FOR VISIT    This is a follow up visit for Ms. Radha Park for Seropositive Rheumatoid Arthritis. Inflammatory arthritis phenotype includes:  Anti-CCP positive: yes (21)  Rheumatoid factor positive: yes (94.8)  Erosive disease: yes  Extra-articular manifestations include: none    Immunosuppression Screening (7/31/2017):   Quantiferon TB: indeterminate  PPD: negative (2016)  PPD: negative (8/16/2017)  Hepatitis B: negative  Hepatitis C: negative    Therapy History includes:  Current DMARD therapy include: methotrexate 20 mg every Wednesday  Prior DMARD therapy include: gold (one year), Humira 40 mg every 14 days (10/31/2017), Enbrel every Monday (2/2018 - 6/11/2018), Ivon Martha 11 mg XR (6/18/2018 to 9/17/2018)  Discontinued DMARDs because of inefficacy: gold, Enbrel, Xeljanz 11 mg XR  Discontinued DMARDs because of side effects: Humira (rash)    Bone Density Historical Synopsis    Height loss since age 29 (at least two inches): 0  Fracture history includes: no  Family history of hip fracture: no  Fall Risk: no    Daily calcium intake is 0 mg  Weekly vitamin D intake is 50,000 IU    Smoking history: no  Alcohol consumption: no  Prednisone history: yes    Exercise: no    Previous work-up for osteoporosis includes the following:  DEXA Scan: 8/28/2017  Vitamin 25OH D level: 24.7 (7/31/2017)  PTH: None  TSH: 1.38 (7/31/2017)    Therapy History includes:    Current osteoporosis therapy includes: none  Prior osteoporosis therapy includes: none  The following osteoporosis therapy have been ineffective: none  The following osteoporosis therapy were stopped because of side effects: none    Immunizations:   Immunization History   Administered Date(s) Administered    Influenza Vaccine 09/24/2012, 11/15/2013, 08/15/2017    Influenza Vaccine PF 11/24/2014, 09/21/2016    Pneumococcal Conjugate (PCV-13) 08/28/2018    Pneumococcal Polysaccharide (PPSV-23) 08/15/2017    TB Skin Test (PPD) Intradermal 08/14/2017    Tdap 08/06/2018    Zoster Vaccine, Live 08/15/2017     Active problems include:    Patient Active Problem List   Diagnosis Code    Chronic pulmonary embolism ([facility]) I27.82    Essential hypertension I10    DNR (do not resuscitate) Z66    Leg swelling M79.89    Seropositive rheumatoid arthritis of multiple sites ([facility] [address] M05.79    Long-term use of immunosuppressant medication Z79.899    Long term (current) use of systemic steroids Z79.52    Morbid obesity with BMI of 45.0-49.9, adult (HCC) E66.01, Z68.42    Fibromyalgia M79.7    Vitamin D deficiency E55.9    CKD (chronic kidney disease) stage 2, GFR 60-89 ml/min N18.2       HISTORY OF PRESENT ILLNESS    Ms. Prudencio Mendez returns for a follow up visit. On her last visit, I continued methotrexate 20 mg every Wednesday and Xeljanz 11 mg XR, with good tolerance. Today, she complains of aching pain in her hands associated with stiffness lasting hours, which improves with use. Rest makes her pain. No known exacerbating factors. She has developed trigger finger in her right 3rd digit. She has swelling in her right hand. She has not noticed any benefit from [place]. Ms. Prudencio Mendez has continued her medications for arthritis (methotrexate, [place]) and reports good tolerance without significant side effects. Last toxicity monitoring by blood work was done on 8/06/2018 and did not reveal any significant adverse effects, except WBC 3.1, Hct 32.5%. CMP 6/18/2018 was normal.     Most recent inflammatory markers from 6/18/2018 revealed a ESR 88 mm/hr (previously 894, 94, 93, 76 mm/hr) and CRP 25.0 mg/L (previously 26.8, 19.7, 26.3, 43.6 mg/L). The patient has not had any interval hospital admissions, infections, or surgeries. REVIEW OF SYSTEMS    A comprehensive review of systems was performed and pertinent results are documented in the HPI, review of systems is otherwise non-contributory.     PAST MEDICAL HISTORY    She has a past medical history of Anemia; Fibromyalgia; Hypertension; Osteoarthritis; Osteoporosis; Pulmonary embolism ([facility] [address] and Rheumatoid arthritis ([facility] [address] FAMILY HISTORY    Her family history includes Arthritis-rheumatoid in her mother; Cancer in her father; Diabetes in her maternal grandmother and son; Heart Attack in her paternal grandmother; Schizophrenia in her son. SOCIAL HISTORY    She reports that she has never smoked. She has never used smokeless tobacco. She reports that she does not drink alcohol or use illicit drugs. MEDICATIONS    Current Outpatient Prescriptions   Medication Sig Dispense Refill    [START ON 9/19/2018] methotrexate (RHEUMATREX) 2.5 mg tablet Take 8 Tabs by mouth every Wednesday for 90 days. 96 Tab 0    folic acid (FOLVITE) 1 mg tablet TAKE 1 TABLET EVERY DAY 90 Tab 0    acetaminophen (TYLENOL ARTHRITIS PAIN) 650 mg TbER Take 650 mg by mouth as needed.  IRON, FERROUS SULFATE, PO Take 65 mg by mouth four (4) times daily.  XARELTO 20 mg tab tablet Take 1 Tab by mouth daily (with breakfast). (Patient taking differently: Take 20 mg by mouth every other day.) 90 Tab 0    multivitamin (ONE A DAY) tablet Take 1 Tab by mouth daily.  metoprolol tartrate (LOPRESSOR) 50 mg tablet two (2) times a day. ALLERGIES    Allergies   Allergen Reactions    Clindamycin Swelling and Other (comments)     fever    Humira [Adalimumab] Hives       PHYSICAL EXAMINATION    Visit Vitals    /79    Pulse 84    Temp 98.6 °F (37 °C)    Resp 20    Ht 5' 6\" (1.676 m)    Wt 298 lb (135.2 kg)    BMI 48.1 kg/m2     Body mass index is 48.1 kg/(m^2). General: Patient is alert, oriented x 3, not in acute distress, daughter at bedside    HEENT:   Sclerae are not injected and appear moist.  There is no alopecia. Neck is supple     Cardiovascular:  Heart is regular rate and rhythm, no murmurs. Chest:  Lungs are clear to auscultation bilaterally. Abdomen:  Obese.     Extremities:  Free of clubbing, cyanosis, edema    Neurological exam:  Muscle strength is full in upper and lower extremities    Skin exam:  There are no rashes, no alopecia, no discoid lesions, no active Raynaud's, no livedo reticularis, no periungual erythema. Musculoskeletal exam:  A comprehensive musculoskeletal exam was performed for all joints of each upper and lower extremity and assessed for swelling, tenderness and range of motion.  Positive results are documented as below:    Decreased ROM of wrists    Left elbow flexion deformity  Right 3rd PIP flexion deformity  Decreased ROM of ankles pain or swelling   Right MTP tenderness (RESOLVED)    Z-Deformities:   none  Casey Neck Deformities:  none  Boutonierre's Deformities:  none  Ulnar Deviation:   none     Joint Count 9/17/2018 6/18/2018 4/16/2018 3/15/2018 1/8/2018 10/9/2017 8/14/2017   Patient pain (0-100) 80 80 25 3 10 0 0   MHAQ 0.5 1 0.125 0 0 0 0.125   Left wrist- Tender 1 1 1 0 1 - 1   Left wrist- Swollen 1 - - 0 - - 1   Left 2nd MCP - Swollen 1 - - - - - -   Left 3rd MCP - Tender - 1 - - - - -   Left 4th MCP - Tender 1 1 - - - - -   Left 5th MCP - Tender 1 - - - - - -   Left thumb IP - Tender 1 - 1 - - - -   Left thumb IP - Swollen 1 - - - - - -   Left 2nd PIP - Tender 1 1 - - - - -   Left 2nd PIP - Swollen 1 1 1 - - - -   Left 3rd PIP - Tender 1 1 1 - - - -   Left 3rd PIP - Swollen 1 1 1 - - - -   Left 4th PIP - Tender 1 1 1 - - - -   Left 4th PIP - Swollen - 1 1 - - - -   Left 5th PIP - Tender 1 1 1 - - - -   Left 5th PIP - Swollen - 1 1 - - - -   Right wrist- Tender 1 1 - - - - 1   Right wrist- Swollen 1 1 1 - - - 1   Right 1st MCP - Tender 1 - - - - - -   Right 2nd MCP - Tender 1 - - - - - -   Right 2nd MCP - Swollen 1 - - - 1 1 -   Right 3rd MCP - Tender 0 1 - - - - -   Right 3rd MCP - Swollen 1 1 - - - 1 -   Right 4th MCP - Swollen - 1 - - - - -   Right 5th MCP - Tender 1 - - - - - -   Right 5th MCP - Swollen 1 - - - - - -   Right 2nd PIP - Tender 1 1 - - - - -   Right 2nd PIP - Swollen 1 1 1 - 1 1 -   Right 3rd PIP - Tender 1 1 - - - - -   Right 3rd PIP - Swollen 1 1 - - - 1 -   Right 4th PIP - Tender 1 1 - - - - -   Right 4th PIP - Swollen 1 1 - - - - -   Right 5th PIP - Tender 1 1 - - - - -   Right 5th PIP - Swollen 1 1 - - - - -   Tender Joint Count (Total) 16 13 5 0 1 - 2   Swollen Joint Count (Total) 13 11 6 0 2 4 2   Physician Assessment (0-10) - 4 3 0 1 - 2   Patient Assessment (0-10) 9 8 3 3 1 0 0   CDAI Total (calculated) - 36 17 3 5 - 6       DATA REVIEW    Laboratory     Recent laboratory results were reviewed, summarized, and discussed with the patient. Imaging    Musculoskeletal Ultrasound    None    Radiographs    Chest 3/15/2018: clear lungs. The cardiac and mediastinal contours and pulmonary vascularity are normal.  The bones and soft tissues are within normal limits. Bilateral Hand 7/31/2017: RIGHT: no fracture. There is diffuse osteopenia. Both corticated and non corticated erosive changes are present involving the radiocarpal joint, carpal joints, and metacarpal carpal joints. In addition, there is involvement of the first and second metacarpal phalangeal joints, most pronounced in the first. The lunate and radius appear ankylosed. LEFT:  no fracture. There is diffuse osteopenia. Both corticated and non corticated erosive changes are present involving the radiocarpal joint, carpal joints, and metacarpal carpal joints. The lunate and radius appear ankylosed. Some erosive changes are also present in the third PIP joint. The scaphoid lunate distance is widened. Left Elbow 7/31/2017: marked flattening of the radial head with sclerosis. In erosive changes also present of the proximal ulna. A moderate joint effusion is seen. Use osteopenia is noted. Bilateral Foot 7/31/2017: RIGHT: diffuse osteopenia. Non corticated erosions are present involving the second, fourth, and fifth MTP joints. Associated soft tissue swelling is present.  The there may be an ankylosis of the fourth and third metatarsals to the midfoot. Flattening of the second metatarsal head is noted. There is a small Achilles and moderate the plantar spur. No fracture is seen. LEFT: diffuse osteopenia. Erosive changes are present at the second and third metatarsal tarsal joints. A small spur is noted at the Achilles insertion. No fractures identified. Less forefoot soft tissue swelling is present. No fracture is seen    Chest 1/30/2017: lungs remain clear. No pneumothorax or pleural effusion apparent. Cardiac silhouette remains large. Endotracheal tube and nasogastric tube have been removed. Left central line stable in position with tip projecting over the superior vena cava. Right Hip 4/26/2016: no fracture or dislocation. The right hip joint spaces normal and symmetric with the left side. Enthesophytes are seen along either iliac crest and there is relatively severe bilateral facet hypertrophy at L5-S1. The sacroiliac joints appear grossly normal.     Right Femur 4/26/2016: the patient is status post prior placement of a 3 component right total knee prosthesis. From this examination a full assessment of the prosthesis is limited. Grossly this examination is negative for a loosening of the prosthetic components, heterotopic ossification, or additional complications of the prosthesis. The joint spaces of the right hip are well maintained without significant osteoarthritis. This examination is negative for a fracture or an insufficiency fracture of the proximal femur. This examination examination is negative for focal lytic or blastic lesions of the right femur. CT Imaging    CT Abdomen and Pelvis without contrast 7/31/2017:there are linear densities at both lung bases suggesting scarring or subsegmental atelectasis. The lung bases are otherwise clear no pleural effusion is seen. The liver spleen and pancreas are unremarkable. No renal stone or mass is seen. No ureteral dilatation or stone is seen.  The urinary bladder is unremarkable. A urinary catheter is noted with tip within the urinary bladder. Small calcifications within the uterus are again noted. The uterus and pelvic adnexa are otherwise unremarkable. No dilated bowel or free air or free fluid is seen. Specifically there is no evidence of retroperitoneal or intra-abdominal hemorrhage. An inferior vena cava filter is noted. MR Imaging    MRI Lumbar Spine with and without contrast 4/26/2016: study is suboptimal secondary to patient's body habitus. T12-L1: Normal for age. L1-L2: Normal for age. L2-L3: There is mild facet joint arthropathy. L3-L4: There is mild disc disease with concentric bulge. Moderate to severe facet arthropathy is present with bony hypertrophy and ligamentous thickening. There is moderate central canal stenosis and lateral recess narrowing. Bilateral foraminal narrowing is present. There is absence of fat within the right neural foramen suggesting involvement of the exiting L3 nerve root. L4-L5: There is mild disc disease with loss of disc height. Moderate to severe facet arthropathy is present with bony hypertrophy and ligamentous thickening. There is moderate central canal stenosis primarily in a transverse direction. Severe lateral recess narrowing is present with moderate bilateral foraminal narrowing. Absence of fat within the right neural foramen suggests involvement of the exiting right L4 nerve root. L5-S1: There is mild disc degeneration with loss of disc height. Moderate to severe facet arthropathy is present with bony hypertrophy and ligamentous thickening. No significant canal stenosis. There is mild lateral recess narrowing. Bilateral neural foraminal narrowing is also present, right greater than left. Absence of fat within the right neural foramen suggests involvement of the exiting right L5 nerve root. Visualized portions of the sacrum are normal. There is no abnormal enhancement.  The conus is normal in contour and signal characteristics. Paraspinal soft tissues are unremarkable. DXA     DXA 8/28/2017: (excluded None) showed lumbar spine L1-L4 T score 1.2 (BMD 1.345 g/cm2), left femoral neck T score -0.9 (0.919 g/cm2), left total hip T score: 0.1 (1.020 g/cm2), right femoral neck T score: -0.4 (0.977 g/cm2), right total hip T score: -0.6 (0.938 g/cm2), and distal one third left radius T score N/A (BMD N/A g/cm2). FRAX score 5.8 % probability in 10 years for major osteoporotic fracture and 0.4 % 10 year probability of hip fracture. ASSESSMENT AND PLAN    This is a follow up visit for Ms. Abraham Alvarado. 1) Seropositive Erosive Rheumatoid Arthritis. She is maintained on methotrexate 20 mg every Wednesday and Xeljanz 11 mg XR with good tolerance but continues to complain of active disease. She has not felt any improvement. Her CDAI was 42 (previously 36, 17, 3, 5) with 16 tender and 13 swollen joints, consistent with high disease activity. Her PtGlobal is influenced by her back pain. I discussed changing her Emmit Perfect to rituximab. She did not have relief from Enbrel previously and did not tolerate Humira. She was amenable to this change. I will continue methotrexate and discontinue Emmit Perfect. Labs today. 2) Long Term Use of Immunosuppressants. The patient remains on immunomodulatory medications (methotrexate, Emmit Perfect) and requires frequent toxicity monitoring by blood work. Respective labs were done last week (CBC and CMP). 3) Vitamin D Deficiency. Her vitamin D level was 48.1 (previously 41.1, 24.7). She is on weekly ergocalciferol 50,000.       4) Morbid Obesity. Her BMI was 48.10 (previously 47.94, 48.74, 48.10, 47.78, 48.97, 48.74). Weight loss is recommended.     5) Fibromyalgia. Her history, constellation of symptoms, and examination are consistent with a pain syndrome, possiblity secondary to Rheumatoid Arthritis.    6) Right Sided Carpal Tunnel Syndrome. This was not an active issue today. 7) CKD Stage 2. This resolved. Her eGFR was 106 (previously 87, 76). 8) Chronic Back Pain. She declined physical therapy. The patient voiced understanding of the aforementioned assessment and plan. Summary of plan was provided in the After Visit Summary patient instructions.      TODAY'S ORDERS    Orders Placed This Encounter    QUANTIFERON-TB GOLD PLUS    CBC WITH AUTOMATED DIFF    METABOLIC PANEL, COMPREHENSIVE    C REACTIVE PROTEIN, QT    SED RATE (ESR)    CHRONIC HEPATITIS PANEL    methotrexate (RHEUMATREX) 2.5 mg tablet     Future Appointments  Date Time Provider Constance Abreu   12/17/2018 2:00 PM MD Emerson Hartman MD, [address] [facility]    Adult Rheumatology   Rheumatology Ultrasound Certified  [facility], [address] [place] [address]   Phone [phone]  Fax [phone]

## 2018-09-19 LAB
ALBUMIN SERPL-MCNC: 4.3 G/DL (ref 3.6–4.8)
ALBUMIN/GLOB SERPL: 1.3 {RATIO} (ref 1.2–2.2)
ALP SERPL-CCNC: 115 IU/L (ref 39–117)
ALT SERPL-CCNC: 5 IU/L (ref 0–32)
AST SERPL-CCNC: 13 IU/L (ref 0–40)
BASOPHILS # BLD AUTO: 0 X10E3/UL (ref 0–0.2)
BASOPHILS NFR BLD AUTO: 1 %
BILIRUB SERPL-MCNC: 0.5 MG/DL (ref 0–1.2)
BUN SERPL-MCNC: 10 MG/DL (ref 8–27)
BUN/CREAT SERPL: 12 (ref 12–28)
CALCIUM SERPL-MCNC: 9.5 MG/DL (ref 8.7–10.3)
CHLORIDE SERPL-SCNC: 105 MMOL/L (ref 96–106)
CO2 SERPL-SCNC: 19 MMOL/L (ref 20–29)
COMMENT, 144067: NORMAL
CREAT SERPL-MCNC: 0.86 MG/DL (ref 0.57–1)
CRP SERPL-MCNC: 20.5 MG/L (ref 0–4.9)
EOSINOPHIL # BLD AUTO: 0 X10E3/UL (ref 0–0.4)
EOSINOPHIL NFR BLD AUTO: 1 %
ERYTHROCYTE [DISTWIDTH] IN BLOOD BY AUTOMATED COUNT: 15.9 % (ref 12.3–15.4)
ERYTHROCYTE [SEDIMENTATION RATE] IN BLOOD BY WESTERGREN METHOD: 90 MM/HR (ref 0–40)
GAMMA INTERFERON BACKGROUND BLD IA-ACNC: 0.03 IU/ML
GLOBULIN SER CALC-MCNC: 3.4 G/DL (ref 1.5–4.5)
GLUCOSE SERPL-MCNC: 78 MG/DL (ref 65–99)
HBV CORE AB SERPL QL IA: NEGATIVE
HBV CORE IGM SERPL QL IA: NEGATIVE
HBV E AB SERPL QL IA: NEGATIVE
HBV E AG SERPL QL IA: NEGATIVE
HBV SURFACE AB SER QL: NON REACTIVE
HBV SURFACE AG SERPL QL IA: NEGATIVE
HCT VFR BLD AUTO: 32.7 % (ref 34–46.6)
HCV AB S/CO SERPL IA: <0.1 S/CO RATIO (ref 0–0.9)
HGB BLD-MCNC: 11 G/DL (ref 11.1–15.9)
IMM GRANULOCYTES # BLD: 0 X10E3/UL (ref 0–0.1)
IMM GRANULOCYTES NFR BLD: 0 %
LYMPHOCYTES # BLD AUTO: 0.9 X10E3/UL (ref 0.7–3.1)
LYMPHOCYTES NFR BLD AUTO: 26 %
M TB IFN-G BLD-IMP: NEGATIVE
M TB IFN-G CD4+ T-CELLS BLD-ACNC: 0.03 IU/ML
M TB IFN-G CD4+ T-CELLS BLD-ACNC: 0.03 IU/ML
MCH RBC QN AUTO: 31.3 PG (ref 26.6–33)
MCHC RBC AUTO-ENTMCNC: 33.6 G/DL (ref 31.5–35.7)
MCV RBC AUTO: 93 FL (ref 79–97)
MITOGEN IGNF BLD-ACNC: 2.8 IU/ML
MONOCYTES # BLD AUTO: 0.2 X10E3/UL (ref 0.1–0.9)
MONOCYTES NFR BLD AUTO: 7 %
NEUTROPHILS # BLD AUTO: 2.3 X10E3/UL (ref 1.4–7)
NEUTROPHILS NFR BLD AUTO: 65 %
PLATELET # BLD AUTO: 269 X10E3/UL (ref 150–379)
POTASSIUM SERPL-SCNC: 4.5 MMOL/L (ref 3.5–5.2)
PROT SERPL-MCNC: 7.7 G/DL (ref 6–8.5)
QUANTIFERON INCUBATION, QF1T: NORMAL
RBC # BLD AUTO: 3.52 X10E6/UL (ref 3.77–5.28)
SERVICE CMNT-IMP: NORMAL
SODIUM SERPL-SCNC: 140 MMOL/L (ref 134–144)
WBC # BLD AUTO: 3.5 X10E3/UL (ref 3.4–10.8)

## 2018-09-20 NOTE — PROGRESS NOTES
The results were reviewed and a letter was sent. Stable anemia. Elevated inflammatory markers (ESR, CRP). Remaining labs are good.

## 2018-09-28 ENCOUNTER — TELEPHONE (OUTPATIENT)
Dept: RHEUMATOLOGY | Age: 68
End: 2018-09-28

## 2018-09-28 RX ORDER — ACETAMINOPHEN 325 MG/1
650 TABLET ORAL ONCE
Status: ACTIVE | OUTPATIENT
Start: 2018-10-01 | End: 2018-10-01

## 2018-09-28 RX ORDER — DIPHENHYDRAMINE HYDROCHLORIDE 50 MG/ML
50 INJECTION, SOLUTION INTRAMUSCULAR; INTRAVENOUS
Status: ACTIVE | OUTPATIENT
Start: 2018-10-01 | End: 2018-10-01

## 2018-09-28 RX ORDER — DIPHENHYDRAMINE HYDROCHLORIDE 50 MG/ML
50 INJECTION, SOLUTION INTRAMUSCULAR; INTRAVENOUS ONCE
Status: ACTIVE | OUTPATIENT
Start: 2018-10-01 | End: 2018-10-01

## 2018-09-28 RX ORDER — ACETAMINOPHEN 325 MG/1
650 TABLET ORAL
Status: ACTIVE | OUTPATIENT
Start: 2018-10-01 | End: 2018-10-01

## 2018-09-28 NOTE — TELEPHONE ENCOUNTER
----- Message from Lara Hernandez sent at 9/28/2018  2:20 PM EDT -----  Regarding: [place]/telephone  Pt stated her insurance decline her infusion. She is requesting for you to call her. Pts number is [phone].

## 2018-09-28 NOTE — TELEPHONE ENCOUNTER
Pt called stating that her Rituxan infusion was denied. I told her that we will have to appeal it and that the infusion center will take care of it. I told her that someone will be in touch with her to let her know the final decision once completed. She stated an understanding.

## 2018-10-01 ENCOUNTER — HOSPITAL ENCOUNTER (OUTPATIENT)
Dept: INFUSION THERAPY | Age: 68
Discharge: HOME | End: 2018-10-01
Payer: MEDICARE

## 2018-10-01 PROCEDURE — 74011250636 HC RX REV CODE- 250/636: Performed by: INTERNAL MEDICINE

## 2018-10-02 NOTE — TELEPHONE ENCOUNTER
Spoke with pt and let her know that her infusion was denied, and that Dr. Maia Emery wanted to know if she wanted to try Actemra injections and see if we could get her approved for them? She stated that she would try them if we can get them approved. An order was submitted today to [facility].

## 2018-10-12 ENCOUNTER — DOCUMENTATION ONLY (OUTPATIENT)
Dept: RHEUMATOLOGY | Age: 68
End: 2018-10-12

## 2018-10-12 NOTE — PROGRESS NOTES
Received a fax from [place] [phone] stating Actemra was approved with zero copay. [facility] will contact the patient for delivery.

## 2018-10-15 ENCOUNTER — APPOINTMENT (OUTPATIENT)
Dept: INFUSION THERAPY | Age: 68
End: 2018-10-15
Payer: MEDICARE

## 2018-11-02 ENCOUNTER — TELEPHONE (OUTPATIENT)
Dept: RHEUMATOLOGY | Age: 68
End: 2018-11-02

## 2018-11-02 NOTE — TELEPHONE ENCOUNTER
----- Message from Sonal Pérez sent at 11/2/2018 11:48 AM EDT -----  Regarding: Dr. Glenroy Kuhn H/[phone]    Pt would like a call back with an appt on Monday, 11/05/18 due to swollen (L) hand started yesterday.

## 2018-11-19 RX ORDER — PREDNISONE 5 MG/1
TABLET ORAL
Qty: 70 TAB | Refills: 0 | Status: CANCELLED | OUTPATIENT
Start: 2018-11-19

## 2018-12-04 ENCOUNTER — TELEPHONE (OUTPATIENT)
Dept: RHEUMATOLOGY | Age: 68
End: 2018-12-04

## 2018-12-04 RX ORDER — PREDNISONE 5 MG/1
TABLET ORAL
Qty: 91 TAB | Refills: 0 | Status: SHIPPED | OUTPATIENT
Start: 2018-12-04 | End: 2019-01-03

## 2018-12-04 NOTE — TELEPHONE ENCOUNTER
Pt called stating that her hands are swollen and her shoulder hurts. She stated that this has been going on for a few weeks now. The pain in her hands comes and goes, and her shoulder just started hurting a couple of days ago. She cannot come in for a visit now since she does not have transportation. She wants a prednisone taper to help with the pain/swelling until her visit on Dec 17th. I told her that I will send a prescription to her pharmacy later today. She stated an understanding.

## 2018-12-17 ENCOUNTER — OFFICE VISIT (OUTPATIENT)
Dept: RHEUMATOLOGY | Age: 68
End: 2018-12-17

## 2018-12-17 ENCOUNTER — HOSPITAL ENCOUNTER (OUTPATIENT)
Dept: LAB | Age: 68
Discharge: HOME | End: 2018-12-17
Payer: MEDICARE

## 2018-12-17 VITALS
DIASTOLIC BLOOD PRESSURE: 84 MMHG | WEIGHT: 292 LBS | BODY MASS INDEX: 46.93 KG/M2 | HEIGHT: 66 IN | HEART RATE: 73 BPM | SYSTOLIC BLOOD PRESSURE: 132 MMHG | TEMPERATURE: 98 F | RESPIRATION RATE: 18 BRPM

## 2018-12-17 DIAGNOSIS — Z79.60 LONG-TERM USE OF IMMUNOSUPPRESSANT MEDICATION: ICD-10-CM

## 2018-12-17 DIAGNOSIS — E66.01 MORBID OBESITY WITH BMI OF 45.0-49.9, ADULT (HCC): ICD-10-CM

## 2018-12-17 DIAGNOSIS — M05.79 SEROPOSITIVE RHEUMATOID ARTHRITIS OF MULTIPLE SITES (HCC): Primary | ICD-10-CM

## 2018-12-17 DIAGNOSIS — E55.9 VITAMIN D DEFICIENCY: ICD-10-CM

## 2018-12-17 PROCEDURE — 80053 COMPREHEN METABOLIC PANEL: CPT

## 2018-12-17 PROCEDURE — 85025 COMPLETE CBC W/AUTO DIFF WBC: CPT

## 2018-12-17 PROCEDURE — 86140 C-REACTIVE PROTEIN: CPT

## 2018-12-17 PROCEDURE — 85651 RBC SED RATE NONAUTOMATED: CPT

## 2018-12-17 PROCEDURE — 36415 COLL VENOUS BLD VENIPUNCTURE: CPT

## 2018-12-17 PROCEDURE — 82306 VITAMIN D 25 HYDROXY: CPT

## 2018-12-17 RX ORDER — METHOTREXATE 2.5 MG/1
20 TABLET ORAL
Qty: 96 TAB | Refills: 0 | Status: CANCELLED | OUTPATIENT
Start: 2018-12-19 | End: 2019-03-19

## 2018-12-17 RX ORDER — METHOTREXATE 25 MG/ML
25 INJECTION INTRA-ARTERIAL; INTRAMUSCULAR; INTRATHECAL; INTRAVENOUS
Qty: 10 ML | Refills: 0 | Status: SHIPPED | OUTPATIENT
Start: 2018-12-23 | End: 2019-01-08 | Stop reason: SDUPTHER

## 2018-12-17 RX ORDER — SYRINGE-NEEDLE,INSULIN,0.5 ML 30 GX5/16"
1 SYRINGE, EMPTY DISPOSABLE MISCELLANEOUS
Qty: 12 SYRINGE | Refills: 3 | Status: SHIPPED | OUTPATIENT
Start: 2018-12-22 | End: 2019-03-10

## 2018-12-17 RX ORDER — FOLIC ACID 1 MG/1
TABLET ORAL
Qty: 90 TAB | Refills: 0 | Status: SHIPPED | OUTPATIENT
Start: 2018-12-17 | End: 2019-06-17 | Stop reason: SDUPTHER

## 2018-12-17 NOTE — PROGRESS NOTES
REASON FOR VISIT    This is a follow up visit for Ms. Sergio Mccormick for Seropositive Rheumatoid Arthritis. Inflammatory arthritis phenotype includes:  Anti-CCP positive: yes (21)  Rheumatoid factor positive: yes (94.8)  Erosive disease: yes  Extra-articular manifestations include: none    Immunosuppression Screening (9/17/2018):   Quantiferon TB: negative  PPD: negative (2016)  PPD: negative (8/16/2017)  Hepatitis B: negative  Hepatitis C: negative    Therapy History includes:  Current DMARD therapy include: methotrexate 20 mg every Wednesday, Actemra 162 mg weekly (11/2018 to present)  Prior DMARD therapy include: gold (one year), Humira 40 mg every 14 days (10/31/2017), Enbrel every Monday (2/2018 - 6/11/2018), Lucendia Ruts 11 mg XR (6/18/2018 to 9/17/2018)  Discontinued DMARDs because of inefficacy: gold, Enbrel, Xeljanz 11 mg XR  Discontinued DMARDs because of side effects: Humira (rash)    Bone Density Historical Synopsis    Height loss since age 29 (at least two inches): 0  Fracture history includes: no  Family history of hip fracture: no  Fall Risk: no    Daily calcium intake is 0 mg  Weekly vitamin D intake is 50,000 IU    Smoking history: no  Alcohol consumption: no  Prednisone history: yes    Exercise: no    Previous work-up for osteoporosis includes the following:  DEXA Scan: 8/28/2017  Vitamin 25OH D level: 24.7 (7/31/2017)  PTH: None  TSH: 1.38 (7/31/2017)    Therapy History includes:    Current osteoporosis therapy includes: none  Prior osteoporosis therapy includes: none  The following osteoporosis therapy have been ineffective: none  The following osteoporosis therapy were stopped because of side effects: none    Immunizations:   Immunization History   Administered Date(s) Administered    Influenza Vaccine 09/24/2012, 11/15/2013, 08/15/2017    Influenza Vaccine PF 11/24/2014, 09/21/2016    Pneumococcal Conjugate (PCV-13) 08/28/2018    Pneumococcal Polysaccharide (PPSV-23) 08/15/2017    TB Skin Test (PPD) Intradermal 08/14/2017    Tdap 08/06/2018    Zoster Vaccine, Live 08/15/2017     Active problems include:    Patient Active Problem List   Diagnosis Code    Chronic pulmonary embolism ([facility]) I27.82    Essential hypertension I10    DNR (do not resuscitate) Z66    Leg swelling M79.89    Seropositive rheumatoid arthritis of multiple sites ([address] M05.79    Long-term use of immunosuppressant medication Z79.899    Long term (current) use of systemic steroids Z79.52    Morbid obesity with BMI of 45.0-49.9, adult (HCC) E66.01, Z68.42    Fibromyalgia M79.7    Vitamin D deficiency E55.9    CKD (chronic kidney disease) stage 2, GFR 60-89 ml/min N18.2       HISTORY OF PRESENT ILLNESS    Ms. Poornima Ling returns for a follow up visit. On her last visit, I continued methotrexate 20 mg every Wednesday and changed Xeljanz 11 mg XR to rituximab due to lack of efficacy. Her insurance denied rituximab and preferred Actemra, which was approved with $0 copay. She received for doses to date with good tolerance but has not had much relief from it. She called on 12/04/2018 due to right shoulder tooth ache pain and loss of ROM. We called her in a prednisone taper which her gave her much. The Actemra injections had not given her much relief for her hands. She could not dress herself. Today, she is on 15 mg of prednisone. Her shoulder and hand pain is gone. She cannot make a fist. She has stiffness lasting an hour. Her swelling improved with prednisone. She can raise her arms now. She denies fever, weight loss, blurred vision, vision loss, oral ulcers, ankle swelling, dry cough, dyspnea, nausea, vomiting, dysphagia, abdominal pain, black or bloody stool, fall since last visit, rash, easy bruising and increased thirst.    Ms. Poornima Ling has continued her medications for arthritis (methotrexate, Actenra) and reports good tolerance without significant side effects.     Last toxicity monitoring by blood work was done on 9/17/2018 and did not reveal any significant adverse effects, except Hct 32.7%. CMP 6/18/2018 was normal.     Most recent inflammatory markers from 9/17/2018 revealed a ESR 90 mm/hr (previously 88, 94, 93, 76 mm/hr) and CRP 20.5 mg/L (previously 25.0, 26.8, 19.7, 26.3, 43.6 mg/L). The patient has not had any interval hospital admissions, infections, or surgeries. REVIEW OF SYSTEMS    A comprehensive review of systems was performed and pertinent results are documented in the HPI, review of systems is otherwise non-contributory. PAST MEDICAL HISTORY    She has a past medical history of Anemia, Fibromyalgia, Hypertension, Osteoarthritis, Osteoporosis, Pulmonary embolism ([address] and Rheumatoid arthritis ([facility] [address] FAMILY HISTORY    Her family history includes Arthritis-rheumatoid in her mother; Cancer in her father; Diabetes in her maternal grandmother and son; Heart Attack in her paternal grandmother; Schizophrenia in her son. SOCIAL HISTORY    She reports that  has never smoked. she has never used smokeless tobacco. She reports that she does not drink alcohol or use drugs. MEDICATIONS    Current Outpatient Medications   Medication Sig Dispense Refill    folic acid (FOLVITE) 1 mg tablet TAKE 1 TABLET EVERY DAY 90 Tab 0    [START ON 12/23/2018] methotrexate, PF, 25 mg/mL injection 1 mL by SubCUTAneous route every Sunday. 10 mL 0    [START ON 12/22/2018] Insulin Syringe-Needle U-100 1 mL 30 gauge x 5/16 syrg 1 Each by Does Not Apply route Every Saturday for 12 doses. To be used for methotrexate solution 12 Syringe 3    predniSONE (DELTASONE) 5 mg tablet 4 tabs daily for 7 days, 3 tabs for 7 days, 2 tabs for 14 days, 1 tab for 14 days 91 Tab 0    tocilizumab (ACTEMRA) 162 mg/0.9 mL syrg 162 mg by SubCUTAneous route every seven (7) days. 4 Syringe 6    acetaminophen (TYLENOL ARTHRITIS PAIN) 650 mg TbER Take 650 mg by mouth as needed.       IRON, FERROUS SULFATE, PO Take 65 mg by mouth four (4) times daily.      XARELTO 20 mg tab tablet Take 1 Tab by mouth daily (with breakfast). (Patient taking differently: Take 20 mg by mouth every other day.) 90 Tab 0    multivitamin (ONE A DAY) tablet Take 1 Tab by mouth daily.  metoprolol tartrate (LOPRESSOR) 50 mg tablet two (2) times a day. ALLERGIES    Allergies   Allergen Reactions    Clindamycin Swelling and Other (comments)     fever    Humira [Adalimumab] Hives       PHYSICAL EXAMINATION    Visit Vitals  /84   Pulse 73   Temp 98 °F (36.7 °C)   Resp 18   Ht 5' 6\" (1.676 m)   Wt 292 lb (132.5 kg)   BMI 47.13 kg/m²     Body mass index is 47.13 kg/m². General: Patient is alert, oriented x 3, not in acute distress, daughter at bedside    HEENT:   Sclerae are not injected and appear moist.  There is no alopecia. Neck is supple     Cardiovascular:  Heart is regular rate and rhythm, no murmurs. Chest:  Lungs are clear to auscultation bilaterally. Abdomen:  Obese. Extremities:  Free of clubbing, cyanosis, edema    Neurological exam:  Muscle strength is full in upper and lower extremities    Skin exam:  There are no rashes, no alopecia, no discoid lesions, no active Raynaud's, no livedo reticularis, no periungual erythema. Musculoskeletal exam:  A comprehensive musculoskeletal exam was performed for all joints of each upper and lower extremity and assessed for swelling, tenderness and range of motion.  Positive results are documented as below:    Decreased ROM of wrists    Left elbow flexion deformity  Right 3rd PIP flexion deformity  Decreased ROM of ankles pain or swelling   Right MTP tenderness    Z-Deformities:   none  [place] Deformities:  none  [place] Deformities:  none  Ulnar Deviation:   none     Joint Count 12/17/2018 9/17/2018 6/18/2018 4/16/2018 3/15/2018 1/8/2018 10/9/2017   Patient pain (0-100) 100 80 80 25 3 10 0   MHAQ 2 0.5 1 0.125 0 0 0   Left elbow - Tender 1 - - - - - -   Left elbow - Swollen 1 - - - - - - Left wrist- Tender 1 1 1 1 0 1 -   Left wrist- Swollen 1 1 - - 0 - -   Left 2nd MCP - Tender 0 - - - - - -   Left 2nd MCP - Swollen 1 1 - - - - -   Left 3rd MCP - Tender - - 1 - - - -   Left 4th MCP - Tender - 1 1 - - - -   Left 5th MCP - Tender - 1 - - - - -   Left thumb IP - Tender - 1 - 1 - - -   Left thumb IP - Swollen - 1 - - - - -   Left 2nd PIP - Tender 0 1 1 - - - -   Left 2nd PIP - Swollen 1 1 1 1 - - -   Left 3rd PIP - Tender 1 1 1 1 - - -   Left 3rd PIP - Swollen 1 1 1 1 - - -   Left 4th PIP - Tender 1 1 1 1 - - -   Left 4th PIP - Swollen 1 - 1 1 - - -   Left 5th PIP - Tender 1 1 1 1 - - -   Left 5th PIP - Swollen 1 - 1 1 - - -   Right wrist- Tender 0 1 1 - - - -   Right wrist- Swollen 1 1 1 1 - - -   Right 1st MCP - Tender - 1 - - - - -   Right 2nd MCP - Tender - 1 - - - - -   Right 2nd MCP - Swollen - 1 - - - 1 1   Right 3rd MCP - Tender - 0 1 - - - -   Right 3rd MCP - Swollen - 1 1 - - - 1   Right 4th MCP - Swollen - - 1 - - - -   Right 5th MCP - Tender - 1 - - - - -   Right 5th MCP - Swollen - 1 - - - - -   Right 2nd PIP - Tender 1 1 1 - - - -   Right 2nd PIP - Swollen 1 1 1 1 - 1 1   Right 3rd PIP - Tender 0 1 1 - - - -   Right 3rd PIP - Swollen 1 1 1 - - - 1   Right 4th PIP - Tender 1 1 1 - - - -   Right 4th PIP - Swollen 1 1 1 - - - -   Right 5th PIP - Tender 1 1 1 - - - -   Right 5th PIP - Swollen 1 1 1 - - - -   Tender Joint Count (Total) 8 16 13 5 0 1 -   Swollen Joint Count (Total) 12 13 11 6 0 2 4   Physician Assessment (0-10) 4 4 4 3 0 1 -   Patient Assessment (0-10) 9.5 9 8 3 3 1 0   CDAI Total (calculated) 33.5 42 36 17 3 5 -       DATA REVIEW    Laboratory     Recent laboratory results were reviewed, summarized, and discussed with the patient. Imaging    Musculoskeletal Ultrasound    None    Radiographs    Chest 3/15/2018: clear lungs. The cardiac and mediastinal contours and pulmonary vascularity are normal.  The bones and soft tissues are within normal limits.      Bilateral Hand 7/31/2017: RIGHT: no fracture. There is diffuse osteopenia. Both corticated and non corticated erosive changes are present involving the radiocarpal joint, carpal joints, and metacarpal carpal joints. In addition, there is involvement of the first and second metacarpal phalangeal joints, most pronounced in the first. The lunate and radius appear ankylosed. LEFT:  no fracture. There is diffuse osteopenia. Both corticated and non corticated erosive changes are present involving the radiocarpal joint, carpal joints, and metacarpal carpal joints. The lunate and radius appear ankylosed. Some erosive changes are also present in the third PIP joint. The scaphoid lunate distance is widened. Left Elbow 7/31/2017: marked flattening of the radial head with sclerosis. In erosive changes also present of the proximal ulna. A moderate joint effusion is seen. Use osteopenia is noted. Bilateral Foot 7/31/2017: RIGHT: diffuse osteopenia. Non corticated erosions are present involving the second, fourth, and fifth MTP joints. Associated soft tissue swelling is present. The there may be an ankylosis of the fourth and third metatarsals to the midfoot. Flattening of the second metatarsal head is noted. There is a small Achilles and moderate the plantar spur. No fracture is seen. LEFT: diffuse osteopenia. Erosive changes are present at the second and third metatarsal tarsal joints. A small spur is noted at the Achilles insertion. No fractures identified. Less forefoot soft tissue swelling is present. No fracture is seen    Chest 1/30/2017: lungs remain clear. No pneumothorax or pleural effusion apparent. Cardiac silhouette remains large. Endotracheal tube and nasogastric tube have been removed. Left central line stable in position with tip projecting over the superior vena cava. Right Hip 4/26/2016: no fracture or dislocation. The right hip joint spaces normal and symmetric with the left side.  Enthesophytes are seen along either iliac crest and there is relatively severe bilateral facet hypertrophy at L5-S1. The sacroiliac joints appear grossly normal.     Right Femur 4/26/2016: the patient is status post prior placement of a 3 component right total knee prosthesis. From this examination a full assessment of the prosthesis is limited. Grossly this examination is negative for a loosening of the prosthetic components, heterotopic ossification, or additional complications of the prosthesis. The joint spaces of the right hip are well maintained without significant osteoarthritis. This examination is negative for a fracture or an insufficiency fracture of the proximal femur. This examination examination is negative for focal lytic or blastic lesions of the right femur. CT Imaging    CT Abdomen and Pelvis without contrast 7/31/2017:there are linear densities at both lung bases suggesting scarring or subsegmental atelectasis. The lung bases are otherwise clear no pleural effusion is seen. The liver spleen and pancreas are unremarkable. No renal stone or mass is seen. No ureteral dilatation or stone is seen. The urinary bladder is unremarkable. A urinary catheter is noted with tip within the urinary bladder. Small calcifications within the uterus are again noted. The uterus and pelvic adnexa are otherwise unremarkable. No dilated bowel or free air or free fluid is seen. Specifically there is no evidence of retroperitoneal or intra-abdominal hemorrhage. An inferior vena cava filter is noted. MR Imaging    MRI Lumbar Spine with and without contrast 4/26/2016: study is suboptimal secondary to patient's body habitus. T12-L1: Normal for age. L1-L2: Normal for age. L2-L3: There is mild facet joint arthropathy. L3-L4: There is mild disc disease with concentric bulge. Moderate to severe facet arthropathy is present with bony hypertrophy and ligamentous thickening. There is moderate central canal stenosis and lateral recess narrowing.  Bilateral foraminal narrowing is present. There is absence of fat within the right neural foramen suggesting involvement of the exiting L3 nerve root. L4-L5: There is mild disc disease with loss of disc height. Moderate to severe facet arthropathy is present with bony hypertrophy and ligamentous thickening. There is moderate central canal stenosis primarily in a transverse direction. Severe lateral recess narrowing is present with moderate bilateral foraminal narrowing. Absence of fat within the right neural foramen suggests involvement of the exiting right L4 nerve root. L5-S1: There is mild disc degeneration with loss of disc height. Moderate to severe facet arthropathy is present with bony hypertrophy and ligamentous thickening. No significant canal stenosis. There is mild lateral recess narrowing. Bilateral neural foraminal narrowing is also present, right greater than left. Absence of fat within the right neural foramen suggests involvement of the exiting right L5 nerve root. Visualized portions of the sacrum are normal. There is no abnormal enhancement. The conus is normal in contour and signal characteristics. Paraspinal soft tissues are unremarkable. DXA     DXA 8/28/2017: (excluded None) showed lumbar spine L1-L4 T score 1.2 (BMD 1.345 g/cm2), left femoral neck T score -0.9 (0.919 g/cm2), left total hip T score: 0.1 (1.020 g/cm2), right femoral neck T score: -0.4 (0.977 g/cm2), right total hip T score: -0.6 (0.938 g/cm2), and distal one third left radius T score N/A (BMD N/A g/cm2). FRAX score 5.8 % probability in 10 years for major osteoporotic fracture and 0.4 % 10 year probability of hip fracture. ASSESSMENT AND PLAN    This is a follow up visit for Ms. Clarke Garza. 1) Seropositive Erosive Rheumatoid Arthritis. She is maintained on methotrexate 20 mg every Wednesday and Actemra 162 mg weekly with good tolerance but has not had improved. She is currently on a prednisone taper with relief.  Her CDAI was 33.5 (previously 42, 36, 17, 3, 5) with 8 tender and 12 swollen joints, consistent with high disease activity. I will change methotrexate to subcutaneous 25 mg weekly and will continue Actemra for now. I had prescribed rituximab but her insurance denied it. I will continue Actemra for now. Labs today. 2) Long Term Use of Immunosuppressants. The patient remains on immunomodulatory medications (methotrexate, Actemra) and requires frequent toxicity monitoring by blood work. Respective labs were done last week (CBC and CMP). 3) Vitamin D Deficiency. Her vitamin D level was 48.1 (previously 41.1, 24.7). She is on weekly ergocalciferol 50,000. I will check her level today. 4) Morbid Obesity. Her BMI was 47.13 (previously 48.10, 47.94, 48.74, 48.10, 47.78, 48.97, 48.74). Weight loss is recommended.     5) Fibromyalgia. Her history, constellation of symptoms, and examination are consistent with a pain syndrome, possiblity secondary to Rheumatoid Arthritis.    6) Right Sided Carpal Tunnel Syndrome. This was not an active issue today. 7) CKD Stage 2. This resolved. Her eGFR was 82 (previously 106, 87, 76). 8) Chronic Back Pain. She declined physical therapy. The patient voiced understanding of the aforementioned assessment and plan. Summary of plan was provided in the After Visit Summary patient instructions.      TODAY'S ORDERS    Orders Placed This Encounter    CBC WITH AUTOMATED DIFF    METABOLIC PANEL, COMPREHENSIVE    C REACTIVE PROTEIN, QT    SED RATE (ESR)    VITAMIN D, 25 HYDROXY    folic acid (FOLVITE) 1 mg tablet    methotrexate, PF, 25 mg/mL injection    Insulin Syringe-Needle U-100 1 mL 30 gauge x 5/16 syrg     Future Appointments   Date Time Provider Constance Lois   3/18/2019  1:40 PM MD Natalie Martin MD, [address]    Adult Rheumatology   Rheumatology Ultrasound Certified  [address], [address] [facility], [address]   Phone [phone]  Fax [phone]

## 2018-12-17 NOTE — PATIENT INSTRUCTIONS
Will change oral methotrexate to to injection. You will inject 1 mL under the skin every 7 days.  Apply an ice cube/pack on the site for 2 minutes to numb the skin

## 2018-12-18 LAB
25(OH)D3+25(OH)D2 SERPL-MCNC: 30 NG/ML (ref 30–100)
ALBUMIN SERPL-MCNC: 4.2 G/DL (ref 3.6–4.8)
ALBUMIN/GLOB SERPL: 1.4 {RATIO} (ref 1.2–2.2)
ALP SERPL-CCNC: 114 IU/L (ref 39–117)
ALT SERPL-CCNC: 11 IU/L (ref 0–32)
AST SERPL-CCNC: 15 IU/L (ref 0–40)
BASOPHILS # BLD AUTO: 0 X10E3/UL (ref 0–0.2)
BASOPHILS NFR BLD AUTO: 1 %
BILIRUB SERPL-MCNC: 0.4 MG/DL (ref 0–1.2)
BUN SERPL-MCNC: 11 MG/DL (ref 8–27)
BUN/CREAT SERPL: 13 (ref 12–28)
CALCIUM SERPL-MCNC: 9.4 MG/DL (ref 8.7–10.3)
CHLORIDE SERPL-SCNC: 105 MMOL/L (ref 96–106)
CO2 SERPL-SCNC: 22 MMOL/L (ref 20–29)
CREAT SERPL-MCNC: 0.82 MG/DL (ref 0.57–1)
CRP SERPL-MCNC: 0.3 MG/L (ref 0–4.9)
EOSINOPHIL # BLD AUTO: 0 X10E3/UL (ref 0–0.4)
EOSINOPHIL NFR BLD AUTO: 1 %
ERYTHROCYTE [DISTWIDTH] IN BLOOD BY AUTOMATED COUNT: 15.3 % (ref 12.3–15.4)
ERYTHROCYTE [SEDIMENTATION RATE] IN BLOOD BY WESTERGREN METHOD: 30 MM/HR (ref 0–40)
GLOBULIN SER CALC-MCNC: 3.1 G/DL (ref 1.5–4.5)
GLUCOSE SERPL-MCNC: 89 MG/DL (ref 65–99)
HCT VFR BLD AUTO: 37.2 % (ref 34–46.6)
HGB BLD-MCNC: 12.1 G/DL (ref 11.1–15.9)
IMM GRANULOCYTES # BLD: 0 X10E3/UL (ref 0–0.1)
IMM GRANULOCYTES NFR BLD: 0 %
LYMPHOCYTES # BLD AUTO: 0.8 X10E3/UL (ref 0.7–3.1)
LYMPHOCYTES NFR BLD AUTO: 20 %
MCH RBC QN AUTO: 32.2 PG (ref 26.6–33)
MCHC RBC AUTO-ENTMCNC: 32.5 G/DL (ref 31.5–35.7)
MCV RBC AUTO: 99 FL (ref 79–97)
MONOCYTES # BLD AUTO: 0.1 X10E3/UL (ref 0.1–0.9)
MONOCYTES NFR BLD AUTO: 4 %
NEUTROPHILS # BLD AUTO: 2.9 X10E3/UL (ref 1.4–7)
NEUTROPHILS NFR BLD AUTO: 74 %
PLATELET # BLD AUTO: 245 X10E3/UL (ref 150–379)
POTASSIUM SERPL-SCNC: 4.7 MMOL/L (ref 3.5–5.2)
PROT SERPL-MCNC: 7.3 G/DL (ref 6–8.5)
RBC # BLD AUTO: 3.76 X10E6/UL (ref 3.77–5.28)
SODIUM SERPL-SCNC: 141 MMOL/L (ref 134–144)
WBC # BLD AUTO: 3.9 X10E3/UL (ref 3.4–10.8)

## 2019-01-08 ENCOUNTER — TELEPHONE (OUTPATIENT)
Dept: RHEUMATOLOGY | Age: 69
End: 2019-01-08

## 2019-01-08 DIAGNOSIS — M05.79 SEROPOSITIVE RHEUMATOID ARTHRITIS OF MULTIPLE SITES (HCC): ICD-10-CM

## 2019-01-08 DIAGNOSIS — Z79.60 LONG-TERM USE OF IMMUNOSUPPRESSANT MEDICATION: ICD-10-CM

## 2019-01-08 RX ORDER — METHOTREXATE 25 MG/ML
25 INJECTION INTRA-ARTERIAL; INTRAMUSCULAR; INTRATHECAL; INTRAVENOUS
Qty: 10 ML | Refills: 0 | Status: SHIPPED | OUTPATIENT
Start: 2019-01-13 | End: 2019-02-19 | Stop reason: SDUPTHER

## 2019-01-08 NOTE — TELEPHONE ENCOUNTER
Patient would like a call back regarding methotrexate does she need a refill was taking pills and injections wants to know which one she is suppose to continue both have no refills.

## 2019-01-08 NOTE — TELEPHONE ENCOUNTER
Spoke with pt who stated that she needed a refill of her methotrexate. We clarified that she is taking the injections once weekly and that she does have enough syringes at home and just needs the methotrexate. A refill will be sent today.

## 2019-02-06 ENCOUNTER — DOCUMENTATION ONLY (OUTPATIENT)
Dept: RHEUMATOLOGY | Age: 69
End: 2019-02-06

## 2019-02-19 DIAGNOSIS — M05.79 SEROPOSITIVE RHEUMATOID ARTHRITIS OF MULTIPLE SITES (HCC): ICD-10-CM

## 2019-02-19 DIAGNOSIS — Z79.60 LONG-TERM USE OF IMMUNOSUPPRESSANT MEDICATION: ICD-10-CM

## 2019-02-19 RX ORDER — METHOTREXATE 25 MG/ML
25 INJECTION INTRA-ARTERIAL; INTRAMUSCULAR; INTRATHECAL; INTRAVENOUS
Qty: 12 ML | Refills: 0 | Status: SHIPPED | OUTPATIENT
Start: 2019-02-24 | End: 2019-06-17 | Stop reason: SDUPTHER

## 2019-02-22 ENCOUNTER — TELEPHONE (OUTPATIENT)
Dept: RHEUMATOLOGY | Age: 69
End: 2019-02-22

## 2019-02-22 NOTE — TELEPHONE ENCOUNTER
Spoke with patient after using 2 identifiers, and informed her [employer] denied coverage of Actemra, and sent a letter stating Actemra is not on the formulary. Patient informed Dr. Inocencio Hollins ordered Molly Dubin injection due to it is preferred drug for [facility], and is equal to Actemra per Dr. Inocencio Hollins. Informed patient Molly Dubin prescription was sent to the pharmacy.

## 2019-03-06 ENCOUNTER — DOCUMENTATION ONLY (OUTPATIENT)
Dept: RHEUMATOLOGY | Age: 69
End: 2019-03-06

## 2019-03-18 ENCOUNTER — OFFICE VISIT (OUTPATIENT)
Dept: RHEUMATOLOGY | Age: 69
End: 2019-03-18

## 2019-03-18 VITALS
WEIGHT: 291 LBS | HEIGHT: 66 IN | DIASTOLIC BLOOD PRESSURE: 88 MMHG | TEMPERATURE: 98 F | SYSTOLIC BLOOD PRESSURE: 132 MMHG | RESPIRATION RATE: 18 BRPM | HEART RATE: 97 BPM | BODY MASS INDEX: 46.77 KG/M2

## 2019-03-18 DIAGNOSIS — N18.2 CKD (CHRONIC KIDNEY DISEASE) STAGE 2, GFR 60-89 ML/MIN: ICD-10-CM

## 2019-03-18 DIAGNOSIS — E55.9 VITAMIN D DEFICIENCY: ICD-10-CM

## 2019-03-18 DIAGNOSIS — M05.79 SEROPOSITIVE RHEUMATOID ARTHRITIS OF MULTIPLE SITES (HCC): Primary | ICD-10-CM

## 2019-03-18 DIAGNOSIS — E66.01 MORBID OBESITY WITH BMI OF 45.0-49.9, ADULT (HCC): ICD-10-CM

## 2019-03-18 DIAGNOSIS — Z79.60 LONG-TERM USE OF IMMUNOSUPPRESSANT MEDICATION: ICD-10-CM

## 2019-03-18 PROBLEM — Z79.52 LONG TERM (CURRENT) USE OF SYSTEMIC STEROIDS: Status: RESOLVED | Noted: 2017-07-31 | Resolved: 2019-03-18

## 2019-03-18 RX ORDER — ERGOCALCIFEROL 1.25 MG/1
50000 CAPSULE ORAL
COMMUNITY
End: 2019-06-17 | Stop reason: SDUPTHER

## 2019-03-18 NOTE — PROGRESS NOTES
Chief Complaint   Patient presents with    Arthritis     1. Have you been to the ER, urgent care clinic since your last visit? Hospitalized since your last visit? No    2. Have you seen or consulted any other health care providers outside of the [address] [place] [address] since your last visit? Include any pap smears or colon screening.  Yes When: March 2019 Where: [facility] ([facility]) Reason for visit: Check up

## 2019-03-18 NOTE — PROGRESS NOTES
REASON FOR VISIT    This is a follow up visit for Ms. Nii Katz for Seropositive Rheumatoid Arthritis. Inflammatory arthritis phenotype includes:  Anti-CCP positive: yes (21)  Rheumatoid factor positive: yes (94.8)  Erosive disease: yes  Extra-articular manifestations include: none    Immunosuppression Screening (9/17/2018):   Quantiferon TB: negative  PPD: negative (2016)  PPD: negative (8/16/2017)  Hepatitis B: negative  Hepatitis C: negative    Therapy History includes:  Current DMARD therapy include: methotrexate 25 mg subcutaneously every Wednesday, Kevzara 200 mg every 14 days (3/19/2019 to present)  Prior DMARD therapy include: gold (one year), methotrexate 20 mg (PO), Humira 40 mg every 14 days (10/31/2017), Enbrel every Monday (2/2018 - 6/11/2018), Nathanel Elysian 11 mg XR (6/18/2018 to 9/17/2018), Actemra 162 mg weekly (11/2018 to 3/2019; no longer on formulary)  Discontinued DMARDs because of inefficacy: gold, Enbrel, Xeljanz 11 mg XR  Discontinued DMARDs because of side effects: Humira (rash)    Bone Density Historical Synopsis    Height loss since age 29 (at least two inches): 0  Fracture history includes: no  Family history of hip fracture: no  Fall Risk: no    Daily calcium intake is 0 mg  Weekly vitamin D intake is 50,000 IU    Smoking history: no  Alcohol consumption: no  Prednisone history: yes    Exercise: no    Previous work-up for osteoporosis includes the following:  DEXA Scan: 8/28/2017  Vitamin 25OH D level: 30.0 (12/17/2018)  PTH: None  TSH: 1.38 (7/31/2017)    Therapy History includes:  Current osteoporosis therapy includes: none  Prior osteoporosis therapy includes: none  The following osteoporosis therapy have been ineffective: none  The following osteoporosis therapy were stopped because of side effects: none    Immunizations:   Immunization History   Administered Date(s) Administered    Influenza Vaccine 09/24/2012, 11/15/2013, 08/15/2017    Influenza Vaccine PF 11/24/2014, 09/21/2016    Pneumococcal Conjugate (PCV-13) 08/28/2018    Pneumococcal Polysaccharide (PPSV-23) 08/15/2017    TB Skin Test (PPD) Intradermal 08/14/2017    Tdap 08/06/2018    Zoster Vaccine, Live 08/15/2017     Active problems include:    Patient Active Problem List   Diagnosis Code    Chronic pulmonary embolism (HCC) I27.82    Essential hypertension I10    DNR (do not resuscitate) Z66    Leg swelling M79.89    Seropositive rheumatoid arthritis of multiple sites ([facility] [address] M05.79    Long-term use of immunosuppressant medication Z79.899    Long term (current) use of systemic steroids Z79.52    Morbid obesity with BMI of 45.0-49.9, adult (HCC) E66.01, Z68.42    Fibromyalgia M79.7    Vitamin D deficiency E55.9    CKD (chronic kidney disease) stage 2, GFR 60-89 ml/min N18.2       HISTORY OF PRESENT ILLNESS    Ms. Cyrus Boudreaux returns for a follow up visit. On her last visit, I changed methotrexate to subcutaneous 25 mg weekly and continued Actemra injections. Her insurance formulary changed to One2start, which she will start tomorrow. Today, she was doing well with the dose increase and change to SubQ but has been off Actemra for 2 weeks due to formulary change. She denies pain, swelling, or stiffness in her joints. She notes triggering of her right 3rd digit that is symptomatic in the morning and then releases. Warm water helps. She denies fever, weight loss, blurred vision, vision loss, oral ulcers, ankle swelling, dry cough, dyspnea, nausea, vomiting, dysphagia, abdominal pain, black or bloody stool, fall since last visit, rash, easy bruising and increased thirst.    Ms. Cyrus Boudreaux has continued her medications for arthritis (methotrexate) and reports good tolerance without significant side effects. Last toxicity monitoring by blood work was done on 12/17/2018 and did not reveal any significant adverse effects, except Hct 32.7%.  CMP 6/18/2018 was normal.     Most recent inflammatory markers from 12/17/2018 revealed a ESR 30 mm/hr (previously 90, 88, 94, 93, 76 mm/hr) and CRP 0.3 mg/L (previously 20.5, 25.0, 26.8, 19.7, 26.3, 43.6 mg/L). The patient has not had any interval hospital admissions, infections, or surgeries. REVIEW OF SYSTEMS    A comprehensive review of systems was performed and pertinent results are documented in the HPI, review of systems is otherwise non-contributory. PAST MEDICAL HISTORY    She has a past medical history of Anemia, Fibromyalgia, Hypertension, Osteoarthritis, Osteoporosis, Pulmonary embolism ([facility] [address] and Rheumatoid arthritis ([facility] [address] FAMILY HISTORY    Her family history includes Arthritis-rheumatoid in her mother; Cancer in her father; Diabetes in her maternal grandmother and son; Heart Attack in her paternal grandmother; Schizophrenia in her son. SOCIAL HISTORY    She reports that  has never smoked. she has never used smokeless tobacco. She reports that she does not drink alcohol or use drugs. MEDICATIONS    Current Outpatient Medications   Medication Sig Dispense Refill    ergocalciferol (ERGOCALCIFEROL) 50,000 unit capsule Take 50,000 Units by mouth every seven (7) days.  methotrexate, PF, 25 mg/mL injection 1 mL by SubCUTAneous route every Sunday. 12 mL 0    folic acid (FOLVITE) 1 mg tablet TAKE 1 TABLET EVERY DAY 90 Tab 0    acetaminophen (TYLENOL ARTHRITIS PAIN) 650 mg TbER Take 650 mg by mouth as needed.  IRON, FERROUS SULFATE, PO Take 65 mg by mouth daily.  XARELTO 20 mg tab tablet Take 1 Tab by mouth daily (with breakfast). (Patient taking differently: Take 20 mg by mouth every other day.) 90 Tab 0    multivitamin (ONE A DAY) tablet Take 1 Tab by mouth daily.  metoprolol tartrate (LOPRESSOR) 50 mg tablet two (2) times a day.  sarilumab (KEVZARA) 200 mg/1.14 mL pnij 200 mg by SubCUTAneous route every fourteen (14) days.  2 Syringe 11        ALLERGIES    Allergies   Allergen Reactions    Clindamycin Swelling and Other (comments)     fever    Humira [Adalimumab] Hives       PHYSICAL EXAMINATION    Visit Vitals  /88   Pulse 97   Temp 98 °F (36.7 °C)   Resp 18   Ht 5' 6\" (1.676 m)   Wt 291 lb (132 kg)   BMI 46.97 kg/m²     Body mass index is 46.97 kg/m². General: Patient is alert, oriented x 3, not in acute distress, daughter at bedside    HEENT:   Sclerae are not injected and appear moist.  There is no alopecia. Neck is supple     Cardiovascular:  Heart is regular rate and rhythm, no murmurs. Chest:  Lungs are clear to auscultation bilaterally. Abdomen:  Obese. Extremities:  Free of clubbing, cyanosis, edema    Neurological exam:  Muscle strength is full in upper and lower extremities    Skin exam:  There are no rashes, no alopecia, no discoid lesions, no active Raynaud's, no livedo reticularis, no periungual erythema. Musculoskeletal exam:  A comprehensive musculoskeletal exam was performed for all joints of each upper and lower extremity and assessed for swelling, tenderness and range of motion.  Positive results are documented as below:    Decreased ROM of wrists    Left elbow flexion deformity  Right 3rd PIP flexion deformity  Decreased ROM of ankles pain or swelling   Right MTP tenderness (RESOLVED)    Z-Deformities:   none  Dahlgren Neck Deformities:  none  Boutonierre's Deformities:  none  Ulnar Deviation:   none     Joint Count 3/18/2019 12/17/2018 9/17/2018 6/18/2018 4/16/2018 3/15/2018 1/8/2018   Patient pain (0-100) 50 100 80 80 25 3 10   MHAQ 0.125 2 0.5 1 0.125 0 0   Left elbow - Tender - 1 - - - - -   Left elbow - Swollen - 1 - - - - -   Left wrist- Tender - 1 1 1 1 0 1   Left wrist- Swollen - 1 1 - - 0 -   Left 2nd MCP - Tender 0 0 - - - - -   Left 2nd MCP - Swollen 1 1 1 - - - -   Left 3rd MCP - Tender 0 - - 1 - - -   Left 3rd MCP - Swollen 1 - - - - - -   Left 4th MCP - Tender - - 1 1 - - -   Left 5th MCP - Tender - - 1 - - - -   Left thumb IP - Tender - - 1 - 1 - -   Left thumb IP - Swollen - - 1 - - - -   Left 2nd PIP - Tender - 0 1 1 - - -   Left 2nd PIP - Swollen - 1 1 1 1 - -   Left 3rd PIP - Tender - 1 1 1 1 - -   Left 3rd PIP - Swollen - 1 1 1 1 - -   Left 4th PIP - Tender - 1 1 1 1 - -   Left 4th PIP - Swollen - 1 - 1 1 - -   Left 5th PIP - Tender - 1 1 1 1 - -   Left 5th PIP - Swollen - 1 - 1 1 - -   Right wrist- Tender - 0 1 1 - - -   Right wrist- Swollen - 1 1 1 1 - -   Right 1st MCP - Tender - - 1 - - - -   Right 2nd MCP - Tender - - 1 - - - -   Right 2nd MCP - Swollen - - 1 - - - 1   Right 3rd MCP - Tender - - 0 1 - - -   Right 3rd MCP - Swollen - - 1 1 - - -   Right 4th MCP - Swollen - - - 1 - - -   Right 5th MCP - Tender - - 1 - - - -   Right 5th MCP - Swollen - - 1 - - - -   Right 2nd PIP - Tender 1 1 1 1 - - -   Right 2nd PIP - Swollen 1 1 1 1 1 - 1   Right 3rd PIP - Tender 1 0 1 1 - - -   Right 3rd PIP - Swollen 1 1 1 1 - - -   Right 4th PIP - Tender 1 1 1 1 - - -   Right 4th PIP - Swollen 1 1 1 1 - - -   Right 5th PIP - Tender 1 1 1 1 - - -   Right 5th PIP - Swollen 1 1 1 1 - - -   Tender Joint Count (Total) 4 8 16 13 5 0 1   Swollen Joint Count (Total) 6 12 13 11 6 0 2   Physician Assessment (0-10) - 4 4 4 3 0 1   Patient Assessment (0-10) 2 9.5 9 8 3 3 1   CDAI Total (calculated) - 33.5 42 36 17 3 5       DATA REVIEW    Laboratory     Recent laboratory results were reviewed, summarized, and discussed with the patient. Imaging    Musculoskeletal Ultrasound    None    Radiographs    Chest 3/15/2018: clear lungs. The cardiac and mediastinal contours and pulmonary vascularity are normal.  The bones and soft tissues are within normal limits. Bilateral Hand 7/31/2017: RIGHT: no fracture. There is diffuse osteopenia. Both corticated and non corticated erosive changes are present involving the radiocarpal joint, carpal joints, and metacarpal carpal joints. In addition, there is involvement of the first and second metacarpal phalangeal joints, most pronounced in the first. The lunate and radius appear ankylosed. LEFT:  no fracture. There is diffuse osteopenia. Both corticated and non corticated erosive changes are present involving the radiocarpal joint, carpal joints, and metacarpal carpal joints. The lunate and radius appear ankylosed. Some erosive changes are also present in the third PIP joint. The scaphoid lunate distance is widened. Left Elbow 7/31/2017: marked flattening of the radial head with sclerosis. In erosive changes also present of the proximal ulna. A moderate joint effusion is seen. Use osteopenia is noted. Bilateral Foot 7/31/2017: RIGHT: diffuse osteopenia. Non corticated erosions are present involving the second, fourth, and fifth MTP joints. Associated soft tissue swelling is present. The there may be an ankylosis of the fourth and third metatarsals to the midfoot. Flattening of the second metatarsal head is noted. There is a small Achilles and moderate the plantar spur. No fracture is seen. LEFT: diffuse osteopenia. Erosive changes are present at the second and third metatarsal tarsal joints. A small spur is noted at the Achilles insertion. No fractures identified. Less forefoot soft tissue swelling is present. No fracture is seen    Chest 1/30/2017: lungs remain clear. No pneumothorax or pleural effusion apparent. Cardiac silhouette remains large. Endotracheal tube and nasogastric tube have been removed. Left central line stable in position with tip projecting over the superior vena cava. Right Hip 4/26/2016: no fracture or dislocation. The right hip joint spaces normal and symmetric with the left side. Enthesophytes are seen along either iliac crest and there is relatively severe bilateral facet hypertrophy at L5-S1. The sacroiliac joints appear grossly normal.     Right Femur 4/26/2016: the patient is status post prior placement of a 3 component right total knee prosthesis. From this examination a full assessment of the prosthesis is limited.  Grossly this examination is negative for a loosening of the prosthetic components, heterotopic ossification, or additional complications of the prosthesis. The joint spaces of the right hip are well maintained without significant osteoarthritis. This examination is negative for a fracture or an insufficiency fracture of the proximal femur. This examination examination is negative for focal lytic or blastic lesions of the right femur. CT Imaging    CT Abdomen and Pelvis without contrast 7/31/2017:there are linear densities at both lung bases suggesting scarring or subsegmental atelectasis. The lung bases are otherwise clear no pleural effusion is seen. The liver spleen and pancreas are unremarkable. No renal stone or mass is seen. No ureteral dilatation or stone is seen. The urinary bladder is unremarkable. A urinary catheter is noted with tip within the urinary bladder. Small calcifications within the uterus are again noted. The uterus and pelvic adnexa are otherwise unremarkable. No dilated bowel or free air or free fluid is seen. Specifically there is no evidence of retroperitoneal or intra-abdominal hemorrhage. An inferior vena cava filter is noted. MR Imaging    MRI Lumbar Spine with and without contrast 4/26/2016: study is suboptimal secondary to patient's body habitus. T12-L1: Normal for age. L1-L2: Normal for age. L2-L3: There is mild facet joint arthropathy. L3-L4: There is mild disc disease with concentric bulge. Moderate to severe facet arthropathy is present with bony hypertrophy and ligamentous thickening. There is moderate central canal stenosis and lateral recess narrowing. Bilateral foraminal narrowing is present. There is absence of fat within the right neural foramen suggesting involvement of the exiting L3 nerve root. L4-L5: There is mild disc disease with loss of disc height. Moderate to severe facet arthropathy is present with bony hypertrophy and ligamentous thickening.  There is moderate central canal stenosis primarily in a transverse direction. Severe lateral recess narrowing is present with moderate bilateral foraminal narrowing. Absence of fat within the right neural foramen suggests involvement of the exiting right L4 nerve root. L5-S1: There is mild disc degeneration with loss of disc height. Moderate to severe facet arthropathy is present with bony hypertrophy and ligamentous thickening. No significant canal stenosis. There is mild lateral recess narrowing. Bilateral neural foraminal narrowing is also present, right greater than left. Absence of fat within the right neural foramen suggests involvement of the exiting right L5 nerve root. Visualized portions of the sacrum are normal. There is no abnormal enhancement. The conus is normal in contour and signal characteristics. Paraspinal soft tissues are unremarkable. DXA     DXA 8/28/2017: (excluded None) showed lumbar spine L1-L4 T score 1.2 (BMD 1.345 g/cm2), left femoral neck T score -0.9 (0.919 g/cm2), left total hip T score: 0.1 (1.020 g/cm2), right femoral neck T score: -0.4 (0.977 g/cm2), right total hip T score: -0.6 (0.938 g/cm2), and distal one third left radius T score N/A (BMD N/A g/cm2). FRAX score 5.8 % probability in 10 years for major osteoporotic fracture and 0.4 % 10 year probability of hip fracture. ASSESSMENT AND PLAN    This is a follow up visit for Ms. Destiny Cisse. 1) Seropositive Erosive Rheumatoid Arthritis. She is maintained on methotrexate 25 mg subcutaneously every Wednesday and was on Actemra 162 mg weekly with good tolerance and improvement but due to formulary change, she will not start [place] tomorrow. Her biggest complaint is her right 3rd trigger finger which started after coming off Actemra. Her CDAI was 14 (previously 33.5, 42, 36, 17, 3, 5) with 4 tender and 6 swollen joints, consistent with moderate disease activity. Labs today. 2) Long Term Use of Immunosuppressants.  The patient remains on immunomodulatory medications (methotrexate) and requires frequent toxicity monitoring by blood work. Respective labs were done last week (CBC and CMP). 3) Vitamin D Deficiency. Her vitamin D level was 48.1 (previously 41.1, 24.7). She is on weekly ergocalciferol 50,000. I will check her level today. 4) Morbid Obesity. Her BMI was 46.97 (previously 47.13, 48.10, 47.94, 48.74, 48.10, 47.78, 48.97, 48.74). Weight loss is recommended.     5) Fibromyalgia. Her history, constellation of symptoms, and examination are consistent with a pain syndrome, possiblity secondary to Rheumatoid Arthritis. This was not an active issue.    6) Right Sided Carpal Tunnel Syndrome. This resolved. 7) CKD Stage 2. This resolved. Her eGFR was 86 (previously 82, 106, 87, 76). 8) Chronic Back Pain. She had declined physical therapy. The patient voiced understanding of the aforementioned assessment and plan. Summary of plan was provided in the After Visit Summary patient instructions.      TODAY'S ORDERS    Orders Placed This Encounter    CBC WITH AUTOMATED DIFF    METABOLIC PANEL, COMPREHENSIVE    SED RATE (ESR)    VITAMIN D, 25 HYDROXY    ergocalciferol (ERGOCALCIFEROL) 50,000 unit capsule     Future Appointments   Date Time Provider Constance Abreu   6/17/2019  2:40 PM Marsha Bear MD [place], [place], [address] [facility]    Adult Rheumatology   Rheumatology Ultrasound Certified  [address]  [address] [address]   Phone [phone]  Fax [phone]

## 2019-03-19 LAB
25(OH)D3+25(OH)D2 SERPL-MCNC: 28.5 NG/ML (ref 30–100)
ALBUMIN SERPL-MCNC: 4.2 G/DL (ref 3.6–4.8)
ALBUMIN/GLOB SERPL: 1.3 {RATIO} (ref 1.2–2.2)
ALP SERPL-CCNC: 92 IU/L (ref 39–117)
ALT SERPL-CCNC: 11 IU/L (ref 0–32)
AST SERPL-CCNC: 20 IU/L (ref 0–40)
BASOPHILS # BLD AUTO: 0 X10E3/UL (ref 0–0.2)
BASOPHILS NFR BLD AUTO: 1 %
BILIRUB SERPL-MCNC: 0.6 MG/DL (ref 0–1.2)
BUN SERPL-MCNC: 8 MG/DL (ref 8–27)
BUN/CREAT SERPL: 9 (ref 12–28)
CALCIUM SERPL-MCNC: 9.5 MG/DL (ref 8.7–10.3)
CHLORIDE SERPL-SCNC: 104 MMOL/L (ref 96–106)
CO2 SERPL-SCNC: 21 MMOL/L (ref 20–29)
CREAT SERPL-MCNC: 0.89 MG/DL (ref 0.57–1)
EOSINOPHIL # BLD AUTO: 0.1 X10E3/UL (ref 0–0.4)
EOSINOPHIL NFR BLD AUTO: 4 %
ERYTHROCYTE [DISTWIDTH] IN BLOOD BY AUTOMATED COUNT: 15.3 % (ref 12.3–15.4)
ERYTHROCYTE [SEDIMENTATION RATE] IN BLOOD BY WESTERGREN METHOD: 13 MM/HR (ref 0–40)
GLOBULIN SER CALC-MCNC: 3.3 G/DL (ref 1.5–4.5)
GLUCOSE SERPL-MCNC: 89 MG/DL (ref 65–99)
HCT VFR BLD AUTO: 35.6 % (ref 34–46.6)
HGB BLD-MCNC: 11.6 G/DL (ref 11.1–15.9)
IMM GRANULOCYTES # BLD AUTO: 0 X10E3/UL (ref 0–0.1)
IMM GRANULOCYTES NFR BLD AUTO: 0 %
LYMPHOCYTES # BLD AUTO: 0.7 X10E3/UL (ref 0.7–3.1)
LYMPHOCYTES NFR BLD AUTO: 25 %
MCH RBC QN AUTO: 32.4 PG (ref 26.6–33)
MCHC RBC AUTO-ENTMCNC: 32.6 G/DL (ref 31.5–35.7)
MCV RBC AUTO: 99 FL (ref 79–97)
MONOCYTES # BLD AUTO: 0.5 X10E3/UL (ref 0.1–0.9)
MONOCYTES NFR BLD AUTO: 17 %
NEUTROPHILS # BLD AUTO: 1.5 X10E3/UL (ref 1.4–7)
NEUTROPHILS NFR BLD AUTO: 53 %
PLATELET # BLD AUTO: 189 X10E3/UL (ref 150–379)
POTASSIUM SERPL-SCNC: 4.3 MMOL/L (ref 3.5–5.2)
PROT SERPL-MCNC: 7.5 G/DL (ref 6–8.5)
RBC # BLD AUTO: 3.58 X10E6/UL (ref 3.77–5.28)
SODIUM SERPL-SCNC: 142 MMOL/L (ref 134–144)
WBC # BLD AUTO: 2.8 X10E3/UL (ref 3.4–10.8)

## 2019-03-19 NOTE — PROGRESS NOTES
The results were reviewed and a letter was sent. White blood count a little low, which you've had before --> will continue to monitor. Vitamin D low --> continue weekly ergocalciferol. Remaining labs are good.

## 2019-06-17 ENCOUNTER — OFFICE VISIT (OUTPATIENT)
Dept: RHEUMATOLOGY | Age: 69
End: 2019-06-17

## 2019-06-17 VITALS
RESPIRATION RATE: 18 BRPM | DIASTOLIC BLOOD PRESSURE: 80 MMHG | HEIGHT: 66 IN | SYSTOLIC BLOOD PRESSURE: 148 MMHG | BODY MASS INDEX: 47.09 KG/M2 | WEIGHT: 293 LBS | TEMPERATURE: 98.1 F | HEART RATE: 108 BPM

## 2019-06-17 DIAGNOSIS — N18.2 CKD (CHRONIC KIDNEY DISEASE) STAGE 2, GFR 60-89 ML/MIN: ICD-10-CM

## 2019-06-17 DIAGNOSIS — M05.79 SEROPOSITIVE RHEUMATOID ARTHRITIS OF MULTIPLE SITES (HCC): Primary | ICD-10-CM

## 2019-06-17 DIAGNOSIS — E55.9 VITAMIN D DEFICIENCY: ICD-10-CM

## 2019-06-17 DIAGNOSIS — Z79.60 LONG-TERM USE OF IMMUNOSUPPRESSANT MEDICATION: ICD-10-CM

## 2019-06-17 RX ORDER — FOLIC ACID 1 MG/1
TABLET ORAL
Qty: 90 TAB | Refills: 0 | Status: SHIPPED | OUTPATIENT
Start: 2019-06-17 | End: 2019-12-02 | Stop reason: SDUPTHER

## 2019-06-17 RX ORDER — ERGOCALCIFEROL 1.25 MG/1
50000 CAPSULE ORAL
Qty: 12 CAP | Refills: 4 | Status: SHIPPED | OUTPATIENT
Start: 2019-06-17 | End: 2019-12-02 | Stop reason: SDUPTHER

## 2019-06-17 RX ORDER — PREDNISONE 5 MG/1
TABLET ORAL
Qty: 91 TAB | Refills: 0 | Status: SHIPPED | OUTPATIENT
Start: 2019-06-17 | End: 2019-08-01

## 2019-06-17 RX ORDER — METHOTREXATE 25 MG/ML
25 INJECTION INTRA-ARTERIAL; INTRAMUSCULAR; INTRATHECAL; INTRAVENOUS
Qty: 12 VIAL | Refills: 0 | Status: SHIPPED | OUTPATIENT
Start: 2019-06-17 | End: 2019-08-05 | Stop reason: SDUPTHER

## 2019-06-17 NOTE — LETTER
6/17/19 Patient: Jutsus Barroso YOB: 1950 Date of Visit: 6/17/2019 Eladia Johnson MD 
[address] [address] VIA Facsimile: [phone] Dear Eladia Johnson MD, Thank you for referring Ms. Acosta to [address] [place] [address] for evaluation. My notes for this consultation are attached. If you have questions, please do not hesitate to call me. I look forward to following your patient along with you.  
 
 
Sincerely, 
 
Kayce Machado MD

## 2019-06-17 NOTE — Clinical Note
I wrote an appeal letter for Actemra which she was on but had formulatry change to [facility] which is not helping

## 2019-06-17 NOTE — PROGRESS NOTES
REASON FOR VISIT    This is a follow up visit for Ms. Cesar Sharma for Seropositive Rheumatoid Arthritis. Inflammatory arthritis phenotype includes:  Anti-CCP positive: yes (21)  Rheumatoid factor positive: yes (94.8)  Erosive disease: yes  Extra-articular manifestations include: none    Immunosuppression Screening (9/17/2018):   Quantiferon TB: negative  PPD: negative (2016)  PPD: negative (8/16/2017)  Hepatitis B: negative  Hepatitis C: negative    Therapy History includes:  Current DMARD therapy include: methotrexate 25 mg subcutaneously every Monnday, Kevzara 200 mg every 14 days (3/19/2019 to 6/17/2019)  Prior DMARD therapy include: gold (one year), methotrexate 20 mg (PO), Humira 40 mg every 14 days (10/31/2017), Enbrel every Monday (2/2018 - 6/11/2018), Duard Cuna 11 mg XR (6/18/2018 to 9/17/2018), Actemra 162 mg weekly (11/2018 to 3/2019; no longer on formulary)  Discontinued DMARDs because of inefficacy: gold, Enbrel, Xeljanz 11 mg XR, Kevzara  Discontinued DMARDs because of side effects: Humira (rash)    Bone Density Historical Synopsis    Height loss since age 29 (at least two inches): 0  Fracture history includes: no  Family history of hip fracture: no  Fall Risk: no    Daily calcium intake is 0 mg  Weekly vitamin D intake is 50,000 IU    Smoking history: no  Alcohol consumption: no  Prednisone history: yes    Exercise: no    Previous work-up for osteoporosis includes the following:  DEXA Scan: 8/28/2017  Vitamin 25OH D level: 30.0 (12/17/2018)  PTH: None  TSH: 1.38 (7/31/2017)    Therapy History includes:  Current osteoporosis therapy includes: none  Prior osteoporosis therapy includes: none  The following osteoporosis therapy have been ineffective: none  The following osteoporosis therapy were stopped because of side effects: none    Immunizations:   Immunization History   Administered Date(s) Administered    Influenza Vaccine 09/24/2012, 11/15/2013, 08/15/2017    Influenza Vaccine PF 11/24/2014, 09/21/2016    Pneumococcal Conjugate (PCV-13) 08/28/2018    Pneumococcal Polysaccharide (PPSV-23) 08/15/2017    TB Skin Test (PPD) Intradermal 08/14/2017    Tdap 08/06/2018    Zoster Vaccine, Live 08/15/2017     Active problems include:    Patient Active Problem List   Diagnosis Code    Chronic pulmonary embolism (HCC) I27.82    Essential hypertension I10    DNR (do not resuscitate) Z66    Leg swelling M79.89    Seropositive rheumatoid arthritis of multiple sites ([facility] M05.79    Long-term use of immunosuppressant medication Z79.899    Morbid obesity with BMI of 45.0-49.9, adult (HCC) E66.01, Z68.42    Fibromyalgia M79.7    Vitamin D deficiency E55.9    CKD (chronic kidney disease) stage 2, GFR 60-89 ml/min N18.2       HISTORY OF PRESENT ILLNESS    Ms. Cesar Sharma returns for a follow up visit. On her last visit, I continued methotrexate subcutaneous 25 mg weekly and continued Odessia Holiday which was the preferred agent by her insurance. Her biggest complaint on her last visit was right 3rd trigger finger which started after coming off Actemra    Today, she complains of pain in her hands associated with stiffness in her hands that improves after couple of seconds with warm water. She cannot make a fist in her hands since coming off Actemra. She felt Odessia Holiday is not helping. She denies psoriasis. She endorses dry cough, pruritic red bump rash on her right elbow, easy bruising. She denies fever, weight loss, blurred vision, vision loss, oral ulcers, ankle swelling, dyspnea, nausea, vomiting, dysphagia, abdominal pain, black or bloody stool, fall since last visit and increased thirst.    Ms. Cesar Sharma has continued her medications for arthritis (methotrexate, Odessia Holiday) and reports good tolerance without significant side effects. Last toxicity monitoring by blood work was done on 3/18/2019 and did not reveal any significant adverse effects, except WBC 2.8.  Outside labs on 6/11/2019 reviewed showed WBC 2.6, ANC 1.2, platelets 327,065, negative lupus anticoagulant    Most recent inflammatory markers from 3/18/2019 revealed a ESR 13 mm/hr (previously 30, 90, 88, 94, 93, 76 mm/hr). The patient has not had any interval hospital admissions, infections, or surgeries. REVIEW OF SYSTEMS    A comprehensive review of systems was performed and pertinent results are documented in the HPI, review of systems is otherwise non-contributory. PAST MEDICAL HISTORY    She has a past medical history of Anemia, Fibromyalgia, Hypertension, Osteoarthritis, Osteoporosis, Pulmonary embolism ([address] and Rheumatoid arthritis ([facility] FAMILY HISTORY    Her family history includes Arthritis-rheumatoid in her mother; Cancer in her father; Diabetes in her maternal grandmother and son; Heart Attack in her paternal grandmother; Schizophrenia in her son. SOCIAL HISTORY    She reports that she has never smoked. She has never used smokeless tobacco. She reports that she does not drink alcohol or use drugs. MEDICATIONS    Current Outpatient Medications   Medication Sig Dispense Refill    ergocalciferol (ERGOCALCIFEROL) 50,000 unit capsule Take 1 Cap by mouth every seven (7) days. 12 Cap 4    methotrexate, PF, 25 mg/mL injection 1 mL by SubCUTAneous route every Monday. 12 Vial 0    folic acid (FOLVITE) 1 mg tablet TAKE 1 TABLET EVERY DAY 90 Tab 0    tocilizumab (ACTEMRA) 162 mg/0.9 mL syrg 162 mg by SubCUTAneous route every fourteen (14) days. Indications: rheumatoid arthritis 2 Syringe 11    predniSONE (DELTASONE) 5 mg tablet 4 tabs daily for 7 days, 3 tabs for 7 days, 2 tabs for 14 days, 1 tab for 14 days 91 Tab 0    acetaminophen (TYLENOL ARTHRITIS PAIN) 650 mg TbER Take 650 mg by mouth as needed.  XARELTO 20 mg tab tablet Take 1 Tab by mouth daily (with breakfast). (Patient taking differently: Take 20 mg by mouth every other day.) 90 Tab 0    multivitamin (ONE A DAY) tablet Take 1 Tab by mouth daily.       metoprolol tartrate (LOPRESSOR) 50 mg tablet two (2) times a day.  IRON, FERROUS SULFATE, PO Take 65 mg by mouth daily. ALLERGIES    Allergies   Allergen Reactions    Clindamycin Swelling and Other (comments)     fever    Humira [Adalimumab] Hives       PHYSICAL EXAMINATION    Visit Vitals  /80   Pulse (!) 108   Temp 98.1 °F (36.7 °C)   Resp 18   Ht 5' 6\" (1.676 m)   Wt 300 lb (136.1 kg)   BMI 48.42 kg/m²     Body mass index is 48.42 kg/m². General: Patient is alert, oriented x 3, not in acute distress, daughter at bedside    HEENT:   Sclerae are not injected and appear moist.  There is no alopecia. Neck is supple     Cardiovascular:  Heart is regular rate and rhythm, no murmurs. Chest:  Lungs are clear to auscultation bilaterally. Abdomen:  Obese. Extremities:  Free of clubbing, cyanosis, edema    Neurological exam:  Muscle strength is full in upper and lower extremities    Skin exam:  There are no rashes, no alopecia, no discoid lesions, no active Raynaud's, no livedo reticularis, no periungual erythema. Small papular lesions on right lateral epicondyle with overlying erythema    Musculoskeletal exam:  A comprehensive musculoskeletal exam was performed for all joints of each upper and lower extremity and assessed for swelling, tenderness and range of motion.  Positive results are documented as below:    Decreased ROM of wrists    Left elbow flexion deformity  Right 3rd PIP flexion deformity  Decreased ROM of ankles pain or swelling     Z-Deformities:   none  [place]ck Deformities:  none  Bout[place]ierre's Deformities:  none  Ulnar Deviation:   none     Joint Count 6/17/2019 3/18/2019 12/17/2018 9/17/2018 6/18/2018 4/16/2018 3/15/2018   Patient pain (0-100) 90 50 100 80 80 25 3   MHAQ 0 0.125 2 0.5 1 0.125 0   Left elbow - Tender - - 1 - - - -   Left elbow - Swollen - - 1 - - - -   Left wrist- Tender 1 - 1 1 1 1 0   Left wrist- Swollen 1 - 1 1 - - 0   Left 1st MCP - Tender 1 - - - - - -   Left 1st MCP - Swollen 0 - - - - - -   Left 2nd MCP - Tender 0 0 0 - - - -   Left 2nd MCP - Swollen 1 1 1 1 - - -   Left 3rd MCP - Tender - 0 - - 1 - -   Left 3rd MCP - Swollen - 1 - - - - -   Left 4th MCP - Tender 1 - - 1 1 - -   Left 4th MCP - Swollen 0 - - - - - -   Left 5th MCP - Tender - - - 1 - - -   Left thumb IP - Tender 1 - - 1 - 1 -   Left thumb IP - Swollen 1 - - 1 - - -   Left 2nd PIP - Tender 1 - 0 1 1 - -   Left 2nd PIP - Swollen 1 - 1 1 1 1 -   Left 3rd PIP - Tender 1 - 1 1 1 1 -   Left 3rd PIP - Swollen 1 - 1 1 1 1 -   Left 4th PIP - Tender 1 - 1 1 1 1 -   Left 4th PIP - Swollen 1 - 1 - 1 1 -   Left 5th PIP - Tender 1 - 1 1 1 1 -   Left 5th PIP - Swollen 1 - 1 - 1 1 -   Right wrist- Tender 0 - 0 1 1 - -   Right wrist- Swollen 1 - 1 1 1 1 -   Right 1st MCP - Tender - - - 1 - - -   Right 2nd MCP - Tender - - - 1 - - -   Right 2nd MCP - Swollen - - - 1 - - -   Right 3rd MCP - Tender - - - 0 1 - -   Right 3rd MCP - Swollen - - - 1 1 - -   Right 4th MCP - Swollen - - - - 1 - -   Right 5th MCP - Tender - - - 1 - - -   Right 5th MCP - Swollen - - - 1 - - -   Right 2nd PIP - Tender 1 1 1 1 1 - -   Right 2nd PIP - Swollen 1 1 1 1 1 1 -   Right 3rd PIP - Tender 1 1 0 1 1 - -   Right 3rd PIP - Swollen 1 1 1 1 1 - -   Right 4th PIP - Tender 1 1 1 1 1 - -   Right 4th PIP - Swollen 1 1 1 1 1 - -   Right 5th PIP - Tender 1 1 1 1 1 - -   Right 5th PIP - Swollen 1 1 1 1 1 - -   Tender Joint Count (Total) 12 4 8 16 13 5 0   Swollen Joint Count (Total) 12 6 12 13 11 6 0   Physician Assessment (0-10) 4 2 4 4 4 3 0   Patient Assessment (0-10) 2 2 9.5 9 8 3 3   CDAI Total (calculated) 30 14 33.5 42 36 17 3       DATA REVIEW    Laboratory     Recent laboratory results were reviewed, summarized, and discussed with the patient. Imaging    Musculoskeletal Ultrasound    None    Radiographs    Chest 3/15/2018: clear lungs.  The cardiac and mediastinal contours and pulmonary vascularity are normal.  The bones and soft tissues are within normal limits. Bilateral Hand 7/31/2017: RIGHT: no fracture. There is diffuse osteopenia. Both corticated and non corticated erosive changes are present involving the radiocarpal joint, carpal joints, and metacarpal carpal joints. In addition, there is involvement of the first and second metacarpal phalangeal joints, most pronounced in the first. The lunate and radius appear ankylosed. LEFT:  no fracture. There is diffuse osteopenia. Both corticated and non corticated erosive changes are present involving the radiocarpal joint, carpal joints, and metacarpal carpal joints. The lunate and radius appear ankylosed. Some erosive changes are also present in the third PIP joint. The scaphoid lunate distance is widened. Left Elbow 7/31/2017: marked flattening of the radial head with sclerosis. In erosive changes also present of the proximal ulna. A moderate joint effusion is seen. Use osteopenia is noted. Bilateral Foot 7/31/2017: RIGHT: diffuse osteopenia. Non corticated erosions are present involving the second, fourth, and fifth MTP joints. Associated soft tissue swelling is present. The there may be an ankylosis of the fourth and third metatarsals to the midfoot. Flattening of the second metatarsal head is noted. There is a small Achilles and moderate the plantar spur. No fracture is seen. LEFT: diffuse osteopenia. Erosive changes are present at the second and third metatarsal tarsal joints. A small spur is noted at the Achilles insertion. No fractures identified. Less forefoot soft tissue swelling is present. No fracture is seen    Chest 1/30/2017: lungs remain clear. No pneumothorax or pleural effusion apparent. Cardiac silhouette remains large. Endotracheal tube and nasogastric tube have been removed. Left central line stable in position with tip projecting over the superior vena cava. Right Hip 4/26/2016: no fracture or dislocation.  The right hip joint spaces normal and symmetric with the left side. Enthesophytes are seen along either iliac crest and there is relatively severe bilateral facet hypertrophy at L5-S1. The sacroiliac joints appear grossly normal.     Right Femur 4/26/2016: the patient is status post prior placement of a 3 component right total knee prosthesis. From this examination a full assessment of the prosthesis is limited. Grossly this examination is negative for a loosening of the prosthetic components, heterotopic ossification, or additional complications of the prosthesis. The joint spaces of the right hip are well maintained without significant osteoarthritis. This examination is negative for a fracture or an insufficiency fracture of the proximal femur. This examination examination is negative for focal lytic or blastic lesions of the right femur. CT Imaging    CT Abdomen and Pelvis without contrast 7/31/2017:there are linear densities at both lung bases suggesting scarring or subsegmental atelectasis. The lung bases are otherwise clear no pleural effusion is seen. The liver spleen and pancreas are unremarkable. No renal stone or mass is seen. No ureteral dilatation or stone is seen. The urinary bladder is unremarkable. A urinary catheter is noted with tip within the urinary bladder. Small calcifications within the uterus are again noted. The uterus and pelvic adnexa are otherwise unremarkable. No dilated bowel or free air or free fluid is seen. Specifically there is no evidence of retroperitoneal or intra-abdominal hemorrhage. An inferior vena cava filter is noted. MR Imaging    MRI Lumbar Spine with and without contrast 4/26/2016: study is suboptimal secondary to patient's body habitus. T12-L1: Normal for age. L1-L2: Normal for age. L2-L3: There is mild facet joint arthropathy. L3-L4: There is mild disc disease with concentric bulge. Moderate to severe facet arthropathy is present with bony hypertrophy and ligamentous thickening.  There is moderate central canal stenosis and lateral recess narrowing. Bilateral foraminal narrowing is present. There is absence of fat within the right neural foramen suggesting involvement of the exiting L3 nerve root. L4-L5: There is mild disc disease with loss of disc height. Moderate to severe facet arthropathy is present with bony hypertrophy and ligamentous thickening. There is moderate central canal stenosis primarily in a transverse direction. Severe lateral recess narrowing is present with moderate bilateral foraminal narrowing. Absence of fat within the right neural foramen suggests involvement of the exiting right L4 nerve root. L5-S1: There is mild disc degeneration with loss of disc height. Moderate to severe facet arthropathy is present with bony hypertrophy and ligamentous thickening. No significant canal stenosis. There is mild lateral recess narrowing. Bilateral neural foraminal narrowing is also present, right greater than left. Absence of fat within the right neural foramen suggests involvement of the exiting right L5 nerve root. Visualized portions of the sacrum are normal. There is no abnormal enhancement. The conus is normal in contour and signal characteristics. Paraspinal soft tissues are unremarkable. DXA     DXA 8/28/2017: (excluded None) showed lumbar spine L1-L4 T score 1.2 (BMD 1.345 g/cm2), left femoral neck T score -0.9 (0.919 g/cm2), left total hip T score: 0.1 (1.020 g/cm2), right femoral neck T score: -0.4 (0.977 g/cm2), right total hip T score: -0.6 (0.938 g/cm2), and distal one third left radius T score N/A (BMD N/A g/cm2). FRAX score 5.8 % probability in 10 years for major osteoporotic fracture and 0.4 % 10 year probability of hip fracture. ASSESSMENT AND PLAN    This is a follow up visit for Ms. Ramon Ruiz. 1) Seropositive Erosive Rheumatoid Arthritis. She is maintained on methotrexate 25 mg subcutaneously every Wednesday and Kevzara 200 mg every 14 days, with good tolerance. She feels worse on Kevzara.  Her CDAI was 30 (previously 14, 33.5, 42, 36, 17, 3, 5) with 12 tender and 12 swollen joints, consistent with high disease activity. He is worse than while he was on Actemra. I will therefore resume Actemra. I wrote an appeal to her insurance company. She has mild leukopenia which is stable without recurrent infections. I will start her on a short course of prednisone, called a prednisone taper, with 5 mg tablets. This regimen is to be taken as follows: 4 tabs (20 mg) for 7 days, 3 tabs (15 mg) for 7 days, 2 tabs (10 mg) for 14 days and then 1 tab (5 mg) for 14 days, and then stop. 2) Long Term Use of Immunosuppressants. The patient remains on immunomodulatory medications (methotrexate, Kevzara) and requires frequent toxicity monitoring by blood work. 3) Vitamin D Deficiency. Her vitamin D level was 28.5 (previously 48.1, 41.1, 24.7). She is on weekly ergocalciferol 50,000. I will check her level on follow up. 4) Morbid Obesity. Her BMI was 48.42 (previously 46.97, 47.13, 48.10, 47.94, 48.74, 48.10, 47.78, 48.97, 48.74). Weight loss is recommended.     5) Fibromyalgia. Her history, constellation of symptoms, and examination are consistent with a pain syndrome, possiblity secondary to Rheumatoid Arthritis. This was not an active issue.    6) Leukopenia. Her WBC was 2.6 (previously 2.8). this is likely from Actemra and now Kevzara    7) CKD Stage 2. This resolved. Her eGFR was 86 (previously 82, 106, 87, 76). 8) Chronic Back Pain. She had declined physical therapy. 9) Positive Lupus Anticoagulant with negative Conform. Her lupus anticoagulant confirm was negative (less than 1.5), therefore she does not have lupus anticoagulant related anti-phospholipid syndrome. 10) Skin Rash. This appears as an insect bite and not hives. She may apply topical steroids. The patient voiced understanding of the aforementioned assessment and plan.  Summary of plan was provided in the After Visit Summary patient instructions.      TODAY'S ORDERS    Orders Placed This Encounter    CBC WITH AUTOMATED DIFF    METABOLIC PANEL, COMPREHENSIVE    C REACTIVE PROTEIN, QT    SED RATE (ESR)    VITAMIN D, 25 HYDROXY    ergocalciferol (ERGOCALCIFEROL) 50,000 unit capsule    methotrexate, PF, 25 mg/mL injection    folic acid (FOLVITE) 1 mg tablet    tocilizumab (ACTEMRA) 162 mg/0.9 mL syrg    predniSONE (DELTASONE) 5 mg tablet     Future Appointments   Date Time Provider Constance Abreu   9/16/2019  2:40 PM Erlin Bear MD [place], MD, [facility]    Adult Rheumatology   Rheumatology Ultrasound Certified  [facility]  A Part of [address], [address] [facility], [address] [place] [address]   Phone [phone]  Fax [phone]

## 2019-06-17 NOTE — PROGRESS NOTES
Chief Complaint   Patient presents with    Arthritis     1. Have you been to the ER, urgent care clinic since your last visit? Hospitalized since your last visit? No    2. Have you seen or consulted any other health care providers outside of the [address] [place] [address] since your last visit? Include any pap smears or colon screening.  Yes [facility] for check up

## 2019-06-17 NOTE — LETTER
6/17/2019 3:04 PM 
 
Ms. Mickey Peterson [address] [phone] To whom it may concern, RE: Mickey Peterson Medication - Actemra Diagnosis - Rheumatoid Arthritis A prior authorization request was submitted and the request was denied due to preferring Geoffry Erps. The patient was on Actemra and was doing well, and after switching to Geoffry Erps she has felt worse. I would like to appeal this decision and appeal for coverage for this medication. She has gold (one year), methotrexate 20 mg (PO), Humira 40 mg every 14 days (10/31/2017), Enbrel every Monday (2/2018 - 6/11/2018), [place] 11 mg XR (6/18/2018 to 9/17/2018). She had rash on Humira. Ineffective medications were gold, Enbrel, Xeljanz 11 mg XR, and Kevzara I request she be resumed on Actemra which helped her much more than Geoffry Erps. If needed, please have the independent reviewer contact me on my cell phone at [phone] to discuss the case. If you have any questions or concerns about this request please contact me. Thank you, 
 
Jj Calderon MD, [facility] Adult Rheumatology Rheumatology Ultrasound Certified Brian I[facility] A Part of [facility], [address] [place] [address] Phone [phone] Fax [phone]

## 2019-06-25 ENCOUNTER — TELEPHONE (OUTPATIENT)
Dept: RHEUMATOLOGY | Age: 69
End: 2019-06-25

## 2019-06-25 ENCOUNTER — DOCUMENTATION ONLY (OUTPATIENT)
Dept: RHEUMATOLOGY | Age: 69
End: 2019-06-25

## 2019-06-25 NOTE — TELEPHONE ENCOUNTER
Received an approval for Actemra syringe, good 6/25/2019-6/25/2020. [facility] informed spoke with Gurpreet Becerril, and she states patient has zero copay for Actemra. [facility] will call the patient, and arrange delivery.

## 2019-06-25 NOTE — TELEPHONE ENCOUNTER
Returned call to  [facility] Per Cover My Meds message to answer more questions for prior auth for Actemra. Spoke with [facility] Ref# [id number].

## 2019-07-02 ENCOUNTER — TELEPHONE (OUTPATIENT)
Dept: RHEUMATOLOGY | Age: 69
End: 2019-07-02

## 2019-07-02 RX ORDER — PREDNISONE 20 MG/1
20 TABLET ORAL
Qty: 5 TAB | Refills: 0 | Status: SHIPPED | OUTPATIENT
Start: 2019-07-02 | End: 2019-09-16

## 2019-07-02 NOTE — TELEPHONE ENCOUNTER
Spoke with pt regarding her hives and she stated that she did have the hives on her right arm and they are now on her left arm. I offered her oral steroids or topical cream.  She stated that she would rather have oral steroids. A prescription will be sent.

## 2019-07-02 NOTE — TELEPHONE ENCOUNTER
Spoke with pt who stated that she is still having hives from the [address]. She has stopped the [address] after her last appt on 6/17, and she is supposed to get her shipment of Actemra today. She does not have any medication for the hives and wants to know what she needs to do about them? Please advise.

## 2019-07-22 DIAGNOSIS — M05.79 SEROPOSITIVE RHEUMATOID ARTHRITIS OF MULTIPLE SITES (HCC): ICD-10-CM

## 2019-08-05 DIAGNOSIS — Z79.60 LONG-TERM USE OF IMMUNOSUPPRESSANT MEDICATION: ICD-10-CM

## 2019-08-05 DIAGNOSIS — M05.79 SEROPOSITIVE RHEUMATOID ARTHRITIS OF MULTIPLE SITES (HCC): ICD-10-CM

## 2019-08-05 RX ORDER — METHOTREXATE 25 MG/ML
25 INJECTION INTRA-ARTERIAL; INTRAMUSCULAR; INTRATHECAL; INTRAVENOUS
Qty: 12 VIAL | Refills: 0 | Status: SHIPPED | OUTPATIENT
Start: 2019-08-05 | End: 2019-09-16 | Stop reason: SDUPTHER

## 2019-09-16 ENCOUNTER — OFFICE VISIT (OUTPATIENT)
Dept: RHEUMATOLOGY | Age: 69
End: 2019-09-16

## 2019-09-16 VITALS
TEMPERATURE: 98.4 F | DIASTOLIC BLOOD PRESSURE: 66 MMHG | HEART RATE: 78 BPM | SYSTOLIC BLOOD PRESSURE: 146 MMHG | HEIGHT: 66 IN | BODY MASS INDEX: 47.09 KG/M2 | RESPIRATION RATE: 18 BRPM | WEIGHT: 293 LBS

## 2019-09-16 DIAGNOSIS — M05.79 SEROPOSITIVE RHEUMATOID ARTHRITIS OF MULTIPLE SITES (HCC): Primary | ICD-10-CM

## 2019-09-16 DIAGNOSIS — N18.2 CKD (CHRONIC KIDNEY DISEASE) STAGE 2, GFR 60-89 ML/MIN: ICD-10-CM

## 2019-09-16 DIAGNOSIS — Z79.60 LONG-TERM USE OF IMMUNOSUPPRESSANT MEDICATION: ICD-10-CM

## 2019-09-16 DIAGNOSIS — E55.9 VITAMIN D DEFICIENCY: ICD-10-CM

## 2019-09-16 RX ORDER — METHOTREXATE 25 MG/ML
25 INJECTION INTRA-ARTERIAL; INTRAMUSCULAR; INTRATHECAL; INTRAVENOUS
Qty: 12 VIAL | Refills: 0 | Status: SHIPPED | OUTPATIENT
Start: 2019-09-16 | End: 2019-12-02 | Stop reason: SDUPTHER

## 2019-09-16 NOTE — PATIENT INSTRUCTIONS
Call [facility] and inform them that I changed your Actemra to every 7 days

## 2019-09-16 NOTE — PROGRESS NOTES
REASON FOR VISIT    This is a follow up visit for Ms. Esparza for    ICD-10-CM   1. Seropositive rheumatoid arthritis of multiple sites ([facility] M05.79     Inflammatory arthritis phenotype includes:  Anti-CCP positive: yes (21)  Rheumatoid factor positive: yes (94.8)  Erosive disease: yes  Extra-articular manifestations include: none    Immunosuppression Screening (9/17/2018): Quantiferon TB: negative  PPD: negative (2016)  PPD: negative (8/16/2017)  Hepatitis B: negative  Hepatitis C: negative    Therapy History includes:  Current DMARD therapy include: methotrexate 25 mg subcutaneously every Monday, Actemra 162 mg every 14 days (6/2019 to present; error; should be every 7 days)  Prior DMARD therapy include: gold (one year), methotrexate 20 mg (PO), Humira 40 mg every 14 days (10/31/2017), Enbrel every Monday (2/2018 - 6/11/2018), Bernadene Dawley 11 mg XR (6/18/2018 to 9/17/2018),  Kevzara 200 mg every 14 days (3/19/2019 to 6/17/2019), Actemra 162 mg weekly (11/2018 to 3/2019; no longer on formulary).   Discontinued DMARDs because of inefficacy: gold, Enbrel, Xeljanz 11 mg XR, Kevzara  Discontinued DMARDs because of side effects: Humira (rash), Kevzara (hives)    Bone Density Historical Synopsis    Height loss since age 29 (at least two inches): 0  Fracture history includes: no  Family history of hip fracture: no  Fall Risk: no    Daily calcium intake is 0 mg  Weekly vitamin D intake is 50,000 IU    Smoking history: no  Alcohol consumption: no  Prednisone history: yes    Exercise: no    Previous work-up for osteoporosis includes the following:  DEXA Scan: 8/28/2017  Vitamin 25OH D level: 30.0 (12/17/2018)  PTH: None  TSH: 1.38 (7/31/2017)    Therapy History includes:  Current osteoporosis therapy includes: none  Prior osteoporosis therapy includes: none  The following osteoporosis therapy have been ineffective: none  The following osteoporosis therapy were stopped because of side effects: none    Immunizations: Immunization History   Administered Date(s) Administered    Influenza Vaccine 09/24/2012, 11/15/2013, 08/15/2017    Influenza Vaccine PF 11/24/2014, 09/21/2016    Pneumococcal Conjugate (PCV-13) 08/28/2018    Pneumococcal Polysaccharide (PPSV-23) 08/15/2017    TB Skin Test (PPD) Intradermal 08/14/2017    Tdap 08/06/2018    Zoster Vaccine, Live 08/15/2017     Active problems include:    Patient Active Problem List   Diagnosis Code    Chronic pulmonary embolism (HCC) I27.82    Essential hypertension I10    DNR (do not resuscitate) Z66    Leg swelling M79.89    Seropositive rheumatoid arthritis of multiple sites ([facility] M05.79    Long-term use of immunosuppressant medication Z79.899    Morbid obesity with BMI of 45.0-49.9, adult ([facility] E66.01, Z68.42    Fibromyalgia M79.7    Vitamin D deficiency E55.9    CKD (chronic kidney disease) stage 2, GFR 60-89 ml/min N18.2       HISTORY OF PRESENT ILLNESS    Ms. Kristie Baker returns for a follow up visit. On her last visit, I continued methotrexate subcutaneous 25 mg weekly and resumed Actemra due to lack of benefit from [facility] and hives but is on every 14 days dosing. Her last dose was last week Monday and feels well for several days. Today, she complains of pain in her hands associated with stiffness in her hands that improves after couple of seconds with warm water. She cannot make a fist in her hands. She cannot fully extend her right arm. She denies fever, weight loss, blurred vision, vision loss, oral ulcers, ankle swelling, dry cough, dyspnea, nausea, vomiting, dysphagia, abdominal pain, black or bloody stool, fall since last visit, rash, easy bruising and increased thirst.    Ms. Kristie Baker has continued her medications for arthritis (methotrexate, [facility]) and reports good tolerance without significant side effects. Last toxicity monitoring by blood work was done on 3/18/2019 and did not reveal any significant adverse effects, except WBC 2.8. Outside labs on 6/11/2019 reviewed showed WBC 2.6, ANC 1.2, platelets 894,602, negative lupus anticoagulant    Most recent inflammatory markers from 3/18/2019 revealed a ESR 13 mm/hr (previously 30, 90, 88, 94, 93, 76 mm/hr). The patient has not had any interval hospital admissions, infections, or surgeries. REVIEW OF SYSTEMS    A comprehensive review of systems was performed and pertinent results are documented in the HPI, review of systems is otherwise non-contributory. PAST MEDICAL HISTORY    She has a past medical history of Anemia, Fibromyalgia, Hypertension, Osteoarthritis, Osteoporosis, Pulmonary embolism ([facility] and Rheumatoid arthritis ([facility] FAMILY HISTORY    Her family history includes Arthritis-rheumatoid in her mother; Cancer in her father; Diabetes in her maternal grandmother and son; Heart Attack in her paternal grandmother; Schizophrenia in her son. SOCIAL HISTORY    She reports that she has never smoked. She has never used smokeless tobacco. She reports that she does not drink alcohol or use drugs. MEDICATIONS    Current Outpatient Medications   Medication Sig Dispense Refill    methotrexate, PF, 25 mg/mL injection 1 mL by SubCUTAneous route every Monday. 12 Vial 0    tocilizumab (ACTEMRA) 162 mg/0.9 mL syringe 0.9 mL by SubCUTAneous route every Monday. Indications: rheumatoid arthritis 4 Syringe 11    folic acid (FOLVITE) 1 mg tablet TAKE 1 TABLET EVERY DAY 90 Tab 0    acetaminophen (TYLENOL ARTHRITIS PAIN) 650 mg TbER Take 650 mg by mouth as needed.  IRON, FERROUS SULFATE, PO Take 65 mg by mouth daily.  XARELTO 20 mg tab tablet Take 1 Tab by mouth daily (with breakfast). (Patient taking differently: Take 15 mg by mouth every other day.) 90 Tab 0    multivitamin (ONE A DAY) tablet Take 1 Tab by mouth daily.  metoprolol tartrate (LOPRESSOR) 50 mg tablet two (2) times a day.       ergocalciferol (ERGOCALCIFEROL) 50,000 unit capsule Take 1 Cap by mouth every seven (7) days. 12 Cap 4        ALLERGIES    Allergies   Allergen Reactions    Clindamycin Swelling and Other (comments)     fever    Humira [Adalimumab] Hives       PHYSICAL EXAMINATION    Visit Vitals  /66   Pulse 78   Temp 98.4 °F (36.9 °C)   Resp 18   Ht 5' 6\" (1.676 m)   Wt 304 lb (137.9 kg)   BMI 49.07 kg/m²     Body mass index is 49.07 kg/m². General: Patient is alert, oriented x 3, not in acute distress, daughter at bedside    HEENT:   Sclerae are not injected and appear moist.  There is no alopecia. Neck is supple     Cardiovascular:  Heart is regular rate and rhythm, no murmurs. Chest:  Lungs are clear to auscultation bilaterally. Abdomen:  Obese. Extremities:  Free of clubbing, cyanosis, edema    Neurological exam:  Muscle strength is full in upper and lower extremities    Skin exam:  There are no rashes, no alopecia, no discoid lesions, no active Raynaud's, no livedo reticularis, no periungual erythema. Small papular lesions on right lateral epicondyle with overlying erythema    Musculoskeletal exam:  A comprehensive musculoskeletal exam was performed for all joints of each upper and lower extremity and assessed for swelling, tenderness and range of motion.  Positive results are documented as below:    Decreased ROM of wrists    Left elbow flexion deformity  Right 3rd PIP flexion deformity  Decreased ROM of ankles pain or swelling   Bilateral ankle tenderness  Bilateral MTP tenderness    Z-Deformities:   none  [place]ck Deformities:  none  Boutonierre's Deformities:  none  Ulnar Deviation:   none     Joint Count 9/16/2019 6/17/2019 3/18/2019 12/17/2018 9/17/2018 6/18/2018 4/16/2018   Patient pain (0-100) 20 90 50 100 80 80 25   MHAQ 0 0 0.125 2 0.5 1 0.125   Left elbow - Tender - - - 1 - - -   Left elbow - Swollen - - - 1 - - -   Left wrist- Tender - 1 - 1 1 1 1   Left wrist- Swollen - 1 - 1 1 - -   Left 1st MCP - Tender - 1 - - - - -   Left 1st MCP - Swollen - 0 - - - - -   Left 2nd MCP - Tender - 0 0 0 - - -   Left 2nd MCP - Swollen - 1 1 1 1 - -   Left 3rd MCP - Tender - - 0 - - 1 -   Left 3rd MCP - Swollen - - 1 - - - -   Left 4th MCP - Tender - 1 - - 1 1 -   Left 4th MCP - Swollen - 0 - - - - -   Left 5th MCP - Tender - - - - 1 - -   Left thumb IP - Tender - 1 - - 1 - 1   Left thumb IP - Swollen - 1 - - 1 - -   Left 2nd PIP - Tender - 1 - 0 1 1 -   Left 2nd PIP - Swollen - 1 - 1 1 1 1   Left 3rd PIP - Tender - 1 - 1 1 1 1   Left 3rd PIP - Swollen - 1 - 1 1 1 1   Left 4th PIP - Tender - 1 - 1 1 1 1   Left 4th PIP - Swollen - 1 - 1 - 1 1   Left 5th PIP - Tender - 1 - 1 1 1 1   Left 5th PIP - Swollen - 1 - 1 - 1 1   Right wrist- Tender 0 0 - 0 1 1 -   Right wrist- Swollen 1 1 - 1 1 1 1   Right 1st MCP - Tender - - - - 1 - -   Right 2nd MCP - Tender - - - - 1 - -   Right 2nd MCP - Swollen - - - - 1 - -   Right 3rd MCP - Tender - - - - 0 1 -   Right 3rd MCP - Swollen - - - - 1 1 -   Right 4th MCP - Swollen - - - - - 1 -   Right 5th MCP - Tender - - - - 1 - -   Right 5th MCP - Swollen - - - - 1 - -   Right 2nd PIP - Tender 0 1 1 1 1 1 -   Right 2nd PIP - Swollen 1 1 1 1 1 1 1   Right 3rd PIP - Tender 0 1 1 0 1 1 -   Right 3rd PIP - Swollen 1 1 1 1 1 1 -   Right 4th PIP - Tender - 1 1 1 1 1 -   Right 4th PIP - Swollen - 1 1 1 1 1 -   Right 5th PIP - Tender - 1 1 1 1 1 -   Right 5th PIP - Swollen - 1 1 1 1 1 -   Tender Joint Count (Total) 0 12 4 8 16 13 5   Swollen Joint Count (Total) 3 12 6 12 13 11 6   Physician Assessment (0-10) - 4 2 4 4 4 3   Patient Assessment (0-10) 2 2 2 9.5 9 8 3   CDAI Total (calculated) - 30 14 33.5 42 36 17       DATA REVIEW    Laboratory     Recent laboratory results were reviewed, summarized, and discussed with the patient. Imaging    Musculoskeletal Ultrasound    None    Radiographs    Chest 3/15/2018: clear lungs. The cardiac and mediastinal contours and pulmonary vascularity are normal.  The bones and soft tissues are within normal limits.      Bilateral Hand 7/31/2017: RIGHT: no fracture. There is diffuse osteopenia. Both corticated and non corticated erosive changes are present involving the radiocarpal joint, carpal joints, and metacarpal carpal joints. In addition, there is involvement of the first and second metacarpal phalangeal joints, most pronounced in the first. The lunate and radius appear ankylosed. LEFT:  no fracture. There is diffuse osteopenia. Both corticated and non corticated erosive changes are present involving the radiocarpal joint, carpal joints, and metacarpal carpal joints. The lunate and radius appear ankylosed. Some erosive changes are also present in the third PIP joint. The scaphoid lunate distance is widened. Left Elbow 7/31/2017: marked flattening of the radial head with sclerosis. In erosive changes also present of the proximal ulna. A moderate joint effusion is seen. Use osteopenia is noted. Bilateral Foot 7/31/2017: RIGHT: diffuse osteopenia. Non corticated erosions are present involving the second, fourth, and fifth MTP joints. Associated soft tissue swelling is present. The there may be an ankylosis of the fourth and third metatarsals to the midfoot. Flattening of the second metatarsal head is noted. There is a small Achilles and moderate the plantar spur. No fracture is seen. LEFT: diffuse osteopenia. Erosive changes are present at the second and third metatarsal tarsal joints. A small spur is noted at the Achilles insertion. No fractures identified. Less forefoot soft tissue swelling is present. No fracture is seen    Chest 1/30/2017: lungs remain clear. No pneumothorax or pleural effusion apparent. Cardiac silhouette remains large. Endotracheal tube and nasogastric tube have been removed. Left central line stable in position with tip projecting over the superior vena cava. Right Hip 4/26/2016: no fracture or dislocation. The right hip joint spaces normal and symmetric with the left side.  Enthesophytes are seen along either iliac crest and there is relatively severe bilateral facet hypertrophy at L5-S1. The sacroiliac joints appear grossly normal.     Right Femur 4/26/2016: the patient is status post prior placement of a 3 component right total knee prosthesis. From this examination a full assessment of the prosthesis is limited. Grossly this examination is negative for a loosening of the prosthetic components, heterotopic ossification, or additional complications of the prosthesis. The joint spaces of the right hip are well maintained without significant osteoarthritis. This examination is negative for a fracture or an insufficiency fracture of the proximal femur. This examination examination is negative for focal lytic or blastic lesions of the right femur. CT Imaging    CT Abdomen and Pelvis without contrast 7/31/2017:there are linear densities at both lung bases suggesting scarring or subsegmental atelectasis. The lung bases are otherwise clear no pleural effusion is seen. The liver spleen and pancreas are unremarkable. No renal stone or mass is seen. No ureteral dilatation or stone is seen. The urinary bladder is unremarkable. A urinary catheter is noted with tip within the urinary bladder. Small calcifications within the uterus are again noted. The uterus and pelvic adnexa are otherwise unremarkable. No dilated bowel or free air or free fluid is seen. Specifically there is no evidence of retroperitoneal or intra-abdominal hemorrhage. An inferior vena cava filter is noted. MR Imaging    MRI Lumbar Spine with and without contrast 4/26/2016: study is suboptimal secondary to patient's body habitus. T12-L1: Normal for age. L1-L2: Normal for age. L2-L3: There is mild facet joint arthropathy. L3-L4: There is mild disc disease with concentric bulge. Moderate to severe facet arthropathy is present with bony hypertrophy and ligamentous thickening. There is moderate central canal stenosis and lateral recess narrowing.  Bilateral foraminal narrowing is present. There is absence of fat within the right neural foramen suggesting involvement of the exiting L3 nerve root. L4-L5: There is mild disc disease with loss of disc height. Moderate to severe facet arthropathy is present with bony hypertrophy and ligamentous thickening. There is moderate central canal stenosis primarily in a transverse direction. Severe lateral recess narrowing is present with moderate bilateral foraminal narrowing. Absence of fat within the right neural foramen suggests involvement of the exiting right L4 nerve root. L5-S1: There is mild disc degeneration with loss of disc height. Moderate to severe facet arthropathy is present with bony hypertrophy and ligamentous thickening. No significant canal stenosis. There is mild lateral recess narrowing. Bilateral neural foraminal narrowing is also present, right greater than left. Absence of fat within the right neural foramen suggests involvement of the exiting right L5 nerve root. Visualized portions of the sacrum are normal. There is no abnormal enhancement. The conus is normal in contour and signal characteristics. Paraspinal soft tissues are unremarkable. DXA     DXA 8/28/2017: (excluded None) showed lumbar spine L1-L4 T score 1.2 (BMD 1.345 g/cm2), left femoral neck T score -0.9 (0.919 g/cm2), left total hip T score: 0.1 (1.020 g/cm2), right femoral neck T score: -0.4 (0.977 g/cm2), right total hip T score: -0.6 (0.938 g/cm2), and distal one third left radius T score N/A (BMD N/A g/cm2). FRAX score 5.8 % probability in 10 years for major osteoporotic fracture and 0.4 % 10 year probability of hip fracture. ASSESSMENT AND PLAN    This is a follow up visit for Ms. Rohini Quintero. 1) Seropositive Erosive Rheumatoid Arthritis. She is maintained on methotrexate 25 mg subcutaneously every Wednesday and Actemra 162 mg every 14 days instead of every 7 days per her weight, with good tolerance.  Her CDAI was 6 (previously 6, 14, 33.5, 42, 36, 17, 3, 5) with 0 tender and 3 swollen joints, consistent with low disease activity. I will change her dose to every 7 days. 2) Long Term Use of Immunosuppressants. The patient remains on immunomodulatory medications (methotrexate, Actemra ) and requires frequent toxicity monitoring by blood work. 3) Vitamin D Deficiency. Her vitamin D level was 28.5 (previously 48.1, 41.1, 24.7). She is on weekly ergocalciferol 50,000. I will check her level. 4) Morbid Obesity. Her BMI was 48.42 (previously 46.97, 47.13, 48.10, 47.94, 48.74, 48.10, 47.78, 48.97, 48.74). Weight loss is recommended.     5) Fibromyalgia. Her history, constellation of symptoms, and examination are consistent with a pain syndrome, possiblity secondary to Rheumatoid Arthritis. This was not an active issue.    6) Leukopenia. Her WBC was 2.6 (previously 2.8). This improved. I will repeat today. 7) CKD Stage 2. This resolved. Her eGFR was 86 (previously 82, 106, 87, 76). I will repeat today. 8) Chronic Back Pain. She had declined physical therapy. 9) Positive Lupus Anticoagulant with negative Conform. Her lupus anticoagulant confirm was negative (less than 1.5), therefore she does not have lupus anticoagulant related anti-phospholipid syndrome. 10) Skin Rash. This resolved. The patient voiced understanding of the aforementioned assessment and plan. Summary of plan was provided in the After Visit Summary patient instructions.      TODAY'S ORDERS    Orders Placed This Encounter    QUANTIFERON-TB GOLD PLUS    VITAMIN D, 25 HYDROXY    CHRONIC HEPATITIS PANEL    METHOTREXATE POLYGLUTAMATES    CBC WITH AUTOMATED DIFF    METABOLIC PANEL, COMPREHENSIVE    C REACTIVE PROTEIN, QT    SED RATE (ESR)    methotrexate, PF, 25 mg/mL injection    tocilizumab (ACTEMRA) 162 mg/0.9 mL syringe     Future Appointments   Date Time Provider Constance Abreu   12/16/2019  2:40 PM Grant Bear MD [address] Zora Buchanan MD, [address]    Adult Rheumatology   Rheumatology Ultrasound Certified  [facility]  A Part of [facility]  [address] [facility], [address]   Phone [phone]  Fax [phone]

## 2019-09-16 NOTE — PROGRESS NOTES
Chief Complaint   Patient presents with    Arthritis     1. Have you been to the ER, urgent care clinic since your last visit? Hospitalized since your last visit? No    2. Have you seen or consulted any other health care providers outside of the [address] [place] [address] since your last visit? Include any pap smears or colon screening.  No

## 2019-09-16 NOTE — LETTER
9/16/19 Patient: Taylor Veliz YOB: 1950 Date of Visit: 9/16/2019 Ana Luisa Palomares MD 
[address] [address] VIA Facsimile: [phone] Dear Ana Luisa Palomares MD, Thank you for referring Ms. Chelle Hancock to [address] [place] [address] for evaluation. My notes for this consultation are attached. If you have questions, please do not hesitate to call me. I look forward to following your patient along with you.  
 
 
Sincerely, 
 
Anand Desai MD

## 2019-09-25 LAB
25(OH)D3+25(OH)D2 SERPL-MCNC: 33.2 NG/ML (ref 30–100)
ALBUMIN SERPL-MCNC: 4.1 G/DL (ref 3.6–4.8)
ALBUMIN/GLOB SERPL: 1.3 {RATIO} (ref 1.2–2.2)
ALP SERPL-CCNC: 68 IU/L (ref 39–117)
ALT SERPL-CCNC: 13 IU/L (ref 0–32)
AST SERPL-CCNC: 19 IU/L (ref 0–40)
BASOPHILS # BLD AUTO: 0 X10E3/UL (ref 0–0.2)
BASOPHILS NFR BLD AUTO: 1 %
BILIRUB SERPL-MCNC: 0.6 MG/DL (ref 0–1.2)
BUN SERPL-MCNC: 8 MG/DL (ref 8–27)
BUN/CREAT SERPL: 9 (ref 12–28)
CALCIUM SERPL-MCNC: 9.3 MG/DL (ref 8.7–10.3)
CHLORIDE SERPL-SCNC: 107 MMOL/L (ref 96–106)
CO2 SERPL-SCNC: 20 MMOL/L (ref 20–29)
COMMENT, 144067: NORMAL
CREAT SERPL-MCNC: 0.86 MG/DL (ref 0.57–1)
CRP SERPL-MCNC: 1 MG/L (ref 0–10)
EOSINOPHIL # BLD AUTO: 0.1 X10E3/UL (ref 0–0.4)
EOSINOPHIL NFR BLD AUTO: 2 %
ERYTHROCYTE [DISTWIDTH] IN BLOOD BY AUTOMATED COUNT: 14.4 % (ref 12.3–15.4)
ERYTHROCYTE [SEDIMENTATION RATE] IN BLOOD BY WESTERGREN METHOD: 35 MM/HR (ref 0–40)
GAMMA INTERFERON BACKGROUND BLD IA-ACNC: 0.05 IU/ML
GLOBULIN SER CALC-MCNC: 3.1 G/DL (ref 1.5–4.5)
GLUCOSE SERPL-MCNC: 89 MG/DL (ref 65–99)
HBV CORE AB SERPL QL IA: NEGATIVE
HBV CORE IGM SERPL QL IA: NEGATIVE
HBV E AB SERPL QL IA: NEGATIVE
HBV E AG SERPL QL IA: NEGATIVE
HBV SURFACE AB SER QL: NON REACTIVE
HBV SURFACE AG SERPL QL IA: NEGATIVE
HCT VFR BLD AUTO: 35.6 % (ref 34–46.6)
HCV AB S/CO SERPL IA: <0.1 S/CO RATIO (ref 0–0.9)
HGB BLD-MCNC: 12 G/DL (ref 11.1–15.9)
IMM GRANULOCYTES # BLD AUTO: 0 X10E3/UL (ref 0–0.1)
IMM GRANULOCYTES NFR BLD AUTO: 0 %
LYMPHOCYTES # BLD AUTO: 0.7 X10E3/UL (ref 0.7–3.1)
LYMPHOCYTES NFR BLD AUTO: 25 %
M TB IFN-G BLD-IMP: NEGATIVE
M TB IFN-G CD4+ BCKGRND COR BLD-ACNC: 0.05 IU/ML
MCH RBC QN AUTO: 32.9 PG (ref 26.6–33)
MCHC RBC AUTO-ENTMCNC: 33.7 G/DL (ref 31.5–35.7)
MCV RBC AUTO: 98 FL (ref 79–97)
METHOTREXATE PG1: 40.22 NMOL/L RBC
METHOTREXATE PG2: 30.17 NMOL/L RBC
METHOTREXATE PG3: 66.76 NMOL/L RBC
METHOTREXATE PG4: 60.61 NMOL/L RBC
METHOTREXATE PG5: 22.68 NMOL/L RBC
METHOTREXATE-PG TOTAL: 220.45 NMOL/L RBC
MITOGEN IGNF BLD-ACNC: 4.05 IU/ML
MONOCYTES # BLD AUTO: 0.3 X10E3/UL (ref 0.1–0.9)
MONOCYTES NFR BLD AUTO: 11 %
MTXPGSRA INTERPRETATION: NORMAL
NEUTROPHILS # BLD AUTO: 1.7 X10E3/UL (ref 1.4–7)
NEUTROPHILS NFR BLD AUTO: 61 %
PLATELET # BLD AUTO: 216 X10E3/UL (ref 150–450)
POTASSIUM SERPL-SCNC: 4.2 MMOL/L (ref 3.5–5.2)
PROT SERPL-MCNC: 7.2 G/DL (ref 6–8.5)
QUANTIFERON INCUBATION, QF1T: NORMAL
QUANTIFERON TB2 AG: 0.06 IU/ML
RBC # BLD AUTO: 3.65 X10E6/UL (ref 3.77–5.28)
SERVICE CMNT-IMP: NORMAL
SODIUM SERPL-SCNC: 145 MMOL/L (ref 134–144)
WBC # BLD AUTO: 2.8 X10E3/UL (ref 3.4–10.8)

## 2019-09-27 NOTE — PROGRESS NOTES
The results were reviewed. Therapeutic methotrexate level 220.45. WBC slightly low at 2.8 (will monitor). All remaining labs are normal/negative.

## 2019-11-24 RX ORDER — SYRINGE AND NEEDLE,INSULIN,1ML 30 GX5/16"
SYRINGE, EMPTY DISPOSABLE MISCELLANEOUS
Qty: 12 SYRINGE | Refills: 4 | Status: SHIPPED | OUTPATIENT
Start: 2019-11-24 | End: 2019-12-02 | Stop reason: SDUPTHER

## 2019-12-02 ENCOUNTER — OFFICE VISIT (OUTPATIENT)
Dept: RHEUMATOLOGY | Age: 69
End: 2019-12-02

## 2019-12-02 VITALS
HEART RATE: 91 BPM | SYSTOLIC BLOOD PRESSURE: 164 MMHG | TEMPERATURE: 97.9 F | WEIGHT: 293 LBS | DIASTOLIC BLOOD PRESSURE: 87 MMHG | BODY MASS INDEX: 47.09 KG/M2 | HEIGHT: 66 IN | RESPIRATION RATE: 18 BRPM

## 2019-12-02 DIAGNOSIS — Z79.60 LONG-TERM USE OF IMMUNOSUPPRESSANT MEDICATION: ICD-10-CM

## 2019-12-02 DIAGNOSIS — N18.2 CKD (CHRONIC KIDNEY DISEASE) STAGE 2, GFR 60-89 ML/MIN: ICD-10-CM

## 2019-12-02 DIAGNOSIS — M05.79 SEROPOSITIVE RHEUMATOID ARTHRITIS OF MULTIPLE SITES (HCC): Primary | ICD-10-CM

## 2019-12-02 DIAGNOSIS — E55.9 VITAMIN D DEFICIENCY: ICD-10-CM

## 2019-12-02 DIAGNOSIS — E55.9 VITAMIN D DEFICIENCY, UNSPECIFIED: ICD-10-CM

## 2019-12-02 RX ORDER — METHOTREXATE 25 MG/ML
25 INJECTION INTRA-ARTERIAL; INTRAMUSCULAR; INTRATHECAL; INTRAVENOUS
Qty: 12 VIAL | Refills: 0 | Status: SHIPPED | OUTPATIENT
Start: 2019-12-02 | End: 2020-03-24

## 2019-12-02 RX ORDER — ERGOCALCIFEROL 1.25 MG/1
50000 CAPSULE ORAL
Qty: 12 CAP | Refills: 4 | Status: SHIPPED | OUTPATIENT
Start: 2019-12-02 | End: 2020-12-15

## 2019-12-02 RX ORDER — SYRINGE-NEEDLE,INSULIN,0.5 ML 30 GX5/16"
SYRINGE, EMPTY DISPOSABLE MISCELLANEOUS
Qty: 12 SYRINGE | Refills: 4 | Status: SHIPPED | OUTPATIENT
Start: 2019-12-02 | End: 2019-12-03 | Stop reason: SDUPTHER

## 2019-12-02 RX ORDER — FOLIC ACID 1 MG/1
TABLET ORAL
Qty: 90 TAB | Refills: 0 | Status: SHIPPED | OUTPATIENT
Start: 2019-12-02 | End: 2020-07-07 | Stop reason: SDUPTHER

## 2019-12-02 NOTE — PROGRESS NOTES
Chief Complaint   Patient presents with    Joint Pain     1. Have you been to the ER, urgent care clinic since your last visit? Hospitalized since your last visit? No    2. Have you seen or consulted any other health care providers outside of the [address] [place] [address] since your last visit? Include any pap smears or colon screening.  No

## 2019-12-02 NOTE — PROGRESS NOTES
REASON FOR VISIT    This is a follow up visit for Ms. Tomasa Mcdonald for    ICD-10-CM   1. Seropositive rheumatoid arthritis of multiple sites ([facility] M05.79     Inflammatory arthritis phenotype includes:  Anti-CCP positive: yes (21)  Rheumatoid factor positive: yes (94.8)  Erosive disease: yes  Extra-articular manifestations include: none    Immunosuppression Screening (9/16/2019):   Quantiferon TB: negative  PPD: negative (2016)  PPD: negative (8/16/2017)  Hepatitis B: negative  Hepatitis C: negative    Therapy History includes:  Current DMARD therapy include: methotrexate 25 mg subcutaneously every Monday, Actemra 162 mg every Monday (9/16/2019 to present)  Prior DMARD therapy include: gold (one year), methotrexate 20 mg (PO), Humira 40 mg every 14 days (10/31/2017), Enbrel every Monday (2/2018 - 6/11/2018), Fatimah Repine 11 mg XR (6/18/2018 to 9/17/2018),  Kevzara 200 mg every 14 days (3/19/2019 to 6/17/2019), Actemra 162 mg weekly (11/2018 to 3/2019; no longer on formulary), Actemra 162 mg every 14 days (6/2019 to present; error; should be every 7 days)  Discontinued DMARDs because of inefficacy: gold, Enbrel, Xeljanz 11 mg XR, Kevzara  Discontinued DMARDs because of side effects: Humira (rash), Kevzara (hives)    Bone Density Historical Synopsis    Height loss since age 29 (at least two inches): 0  Fracture history includes: no  Family history of hip fracture: no  Fall Risk: no    Daily calcium intake is 0 mg  Weekly vitamin D intake is 50,000 IU    Smoking history: no  Alcohol consumption: no  Prednisone history: yes    Exercise: no    Previous work-up for osteoporosis includes the following:  DEXA Scan: 8/28/2017  Vitamin 25OH D level: 33.2 (9/16/2019)  PTH: None  TSH: 1.38 (7/31/2017)    Therapy History includes:  Current osteoporosis therapy includes: none  Prior osteoporosis therapy includes: none  The following osteoporosis therapy have been ineffective: none  The following osteoporosis therapy were stopped because of side effects: none    Immunizations:   Immunization History   Administered Date(s) Administered    Influenza Vaccine 09/24/2012, 11/15/2013, 08/15/2017    Influenza Vaccine PF 11/24/2014, 09/21/2016    Pneumococcal Conjugate (PCV-13) 08/28/2018    Pneumococcal Polysaccharide (PPSV-23) 08/15/2017    TB Skin Test (PPD) Intradermal 08/14/2017    Tdap 08/06/2018    Zoster Vaccine, Live 08/15/2017     Active problems include:    Patient Active Problem List   Diagnosis Code    Chronic pulmonary embolism (HCC) I27.82    Essential hypertension I10    DNR (do not resuscitate) Z66    Leg swelling M79.89    Seropositive rheumatoid arthritis of multiple sites ([facility] M05.79    Long-term use of immunosuppressant medication Z79.899    Morbid obesity with BMI of 45.0-49.9, adult (HCC) E66.01, Z68.42    Fibromyalgia M79.7    Vitamin D deficiency E55.9    CKD (chronic kidney disease) stage 2, GFR 60-89 ml/min N18.2       HISTORY OF PRESENT ILLNESS    Ms. Rebecca Dixon returns for a follow up visit. On her last visit, I continued methotrexate subcutaneous 25 mg weekly and asked her to take Actemra every 7 days rather than every 14 days due to appropriate dosing parameters, which she has been taking with good tolerance and improvement. She notes breakthrough 3 days before her dose. Today, she feels better. She has morning stiffness in her hands lasting 3 hours but denies pain or swelling in her hands. Warm water helps reduce her stiffness. She cannot fully extend her left elbow. She denies fever, weight loss, blurred vision, vision loss, oral ulcers, ankle swelling, dry cough, dyspnea, nausea, vomiting, dysphagia, abdominal pain, black or bloody stool, fall since last visit, rash, easy bruising and increased thirst.    Ms. Rebecca Dixon has continued her medications for arthritis (methotrexate, Actemra) and reports good tolerance without significant side effects.     Last toxicity monitoring by blood work was done on 9/16/2019 and did not reveal any significant adverse effects, except WBC 2.8. Most recent inflammatory markers from 9/16/2019 revealed a ESR 35 mm/hr and CRP 1 mg/L. The patient has not had any interval hospital admissions, infections, or surgeries. REVIEW OF SYSTEMS    A comprehensive review of systems was performed and pertinent results are documented in the HPI, review of systems is otherwise non-contributory. PAST MEDICAL HISTORY    She has a past medical history of Anemia, Fibromyalgia, Hypertension, Osteoarthritis, Osteoporosis, Pulmonary embolism ([facility] and Rheumatoid arthritis ([facility] FAMILY HISTORY    Her family history includes Arthritis-rheumatoid in her mother; Cancer in her father; Diabetes in her maternal grandmother and son; Heart Attack in her paternal grandmother; Schizophrenia in her son. SOCIAL HISTORY    She reports that she has never smoked. She has never used smokeless tobacco. She reports that she does not drink alcohol or use drugs. MEDICATIONS    Current Outpatient Medications   Medication Sig Dispense Refill    methotrexate, PF, 25 mg/mL injection 1 mL by SubCUTAneous route every Monday. 12 Vial 0    folic acid (FOLVITE) 1 mg tablet TAKE 1 TABLET EVERY DAY 90 Tab 0    ergocalciferol (ERGOCALCIFEROL) 50,000 unit capsule Take 1 Cap by mouth every seven (7) days. 12 Cap 4    Insulin Syringe-Needle U-100 (DROPLET INSULIN SYRINGE) 1 mL 30 gauge x 5/16 syrg USE TO INJECT METHOTREXATE ONE TIME WEEKLY 12 Syringe 4    tocilizumab (ACTEMRA) 162 mg/0.9 mL syringe 0.9 mL by SubCUTAneous route every Monday. Indications: rheumatoid arthritis 4 Syringe 11    acetaminophen (TYLENOL ARTHRITIS PAIN) 650 mg TbER Take 650 mg by mouth as needed.  IRON, FERROUS SULFATE, PO Take 65 mg by mouth daily.  XARELTO 20 mg tab tablet Take 1 Tab by mouth daily (with breakfast).  (Patient taking differently: Take 15 mg by mouth every other day.) 90 Tab 0    multivitamin (ONE A DAY) tablet Take 1 Tab by mouth daily.  metoprolol tartrate (LOPRESSOR) 50 mg tablet two (2) times a day. ALLERGIES    Allergies   Allergen Reactions    Clindamycin Swelling and Other (comments)     fever    Humira [Adalimumab] Hives       PHYSICAL EXAMINATION    Visit Vitals  /87   Pulse 91   Temp 97.9 °F (36.6 °C)   Resp 18   Ht 5' 6\" (1.676 m)   Wt 308 lb (139.7 kg)   BMI 49.71 kg/m²     Body mass index is 49.71 kg/m². General: Patient is alert, oriented x 3, not in acute distress, daughter at bedside    HEENT:   Sclerae are not injected and appear moist.  There is no alopecia. Neck is supple     Cardiovascular:  Heart is regular rate and rhythm, no murmurs. Chest:  Lungs are clear to auscultation bilaterally. Abdomen:  Obese. Extremities:  Free of clubbing, cyanosis, edema    Neurological exam:  Muscle strength is full in upper and lower extremities    Skin exam:  There are no rashes, no alopecia, no discoid lesions, no active Raynaud's, no livedo reticularis, no periungual erythema. Musculoskeletal exam:  A comprehensive musculoskeletal exam was performed for all joints of each upper and lower extremity and assessed for swelling, tenderness and range of motion.  Positive results are documented as below:    Decreased ROM of wrists    Left elbow flexion deformity  Right 3rd PIP flexion deformity  Decreased ROM of ankles pain or swelling   Bilateral ankle tenderness (RESOLVED)  Bilateral MTP tenderness (RESOLVED)    Z-Deformities:   none  [place]ck Deformities:  none  Boutonierre's Deformities:  none  Ulnar Deviation:   none     Joint Count 12/2/2019 9/16/2019 6/17/2019 3/18/2019 12/17/2018 9/17/2018 6/18/2018   Patient pain (0-100) 50 20 90 50 100 80 80   MHAQ 0 0 0 0.125 2 0.5 1   Left elbow - Tender - - - - 1 - -   Left elbow - Swollen - - - - 1 - -   Left wrist- Tender 1 - 1 - 1 1 1   Left wrist- Swollen 0 - 1 - 1 1 -   Left 1st MCP - Tender - - 1 - - - -   Left 1st MCP - Swollen - - 0 - - - -   Left 2nd MCP - Tender - - 0 0 0 - -   Left 2nd MCP - Swollen - - 1 1 1 1 -   Left 3rd MCP - Tender - - - 0 - - 1   Left 3rd MCP - Swollen - - - 1 - - -   Left 4th MCP - Tender - - 1 - - 1 1   Left 4th MCP - Swollen - - 0 - - - -   Left 5th MCP - Tender - - - - - 1 -   Left thumb IP - Tender - - 1 - - 1 -   Left thumb IP - Swollen - - 1 - - 1 -   Left 2nd PIP - Tender - - 1 - 0 1 1   Left 2nd PIP - Swollen - - 1 - 1 1 1   Left 3rd PIP - Tender 1 - 1 - 1 1 1   Left 3rd PIP - Swollen 1 - 1 - 1 1 1   Left 4th PIP - Tender 1 - 1 - 1 1 1   Left 4th PIP - Swollen 1 - 1 - 1 - 1   Left 5th PIP - Tender 1 - 1 - 1 1 1   Left 5th PIP - Swollen 1 - 1 - 1 - 1   Right wrist- Tender 0 0 0 - 0 1 1   Right wrist- Swollen 1 1 1 - 1 1 1   Right 1st MCP - Tender - - - - - 1 -   Right 2nd MCP - Tender - - - - - 1 -   Right 2nd MCP - Swollen - - - - - 1 -   Right 3rd MCP - Tender - - - - - 0 1   Right 3rd MCP - Swollen - - - - - 1 1   Right 4th MCP - Swollen - - - - - - 1   Right 5th MCP - Tender - - - - - 1 -   Right 5th MCP - Swollen - - - - - 1 -   Right 2nd PIP - Tender - 0 1 1 1 1 1   Right 2nd PIP - Swollen - 1 1 1 1 1 1   Right 3rd PIP - Tender 0 0 1 1 0 1 1   Right 3rd PIP - Swollen 1 1 1 1 1 1 1   Right 4th PIP - Tender - - 1 1 1 1 1   Right 4th PIP - Swollen - - 1 1 1 1 1   Right 5th PIP - Tender 0 - 1 1 1 1 1   Right 5th PIP - Swollen 1 - 1 1 1 1 1   Tender Joint Count (Total) 4 0 12 4 8 16 13   Swollen Joint Count (Total) 6 3 12 6 12 13 11   Physician Assessment (0-10) 2 1 4 2 4 4 4   Patient Assessment (0-10) 5 2 2 2 9.5 9 8   CDAI Total (calculated) 17 6 30 14 33.5 42  [facility]       DATA REVIEW    Laboratory     Recent laboratory results were reviewed, summarized, and discussed with the patient. Imaging    Musculoskeletal Ultrasound    None    Radiographs    Chest 3/15/2018: clear lungs.  The cardiac and mediastinal contours and pulmonary vascularity are normal.  The bones and soft tissues are within normal limits. Bilateral Hand 7/31/2017: RIGHT: no fracture. There is diffuse osteopenia. Both corticated and non corticated erosive changes are present involving the radiocarpal joint, carpal joints, and metacarpal carpal joints. In addition, there is involvement of the first and second metacarpal phalangeal joints, most pronounced in the first. The lunate and radius appear ankylosed. LEFT:  no fracture. There is diffuse osteopenia. Both corticated and non corticated erosive changes are present involving the radiocarpal joint, carpal joints, and metacarpal carpal joints. The lunate and radius appear ankylosed. Some erosive changes are also present in the third PIP joint. The scaphoid lunate distance is widened. Left Elbow 7/31/2017: marked flattening of the radial head with sclerosis. In erosive changes also present of the proximal ulna. A moderate joint effusion is seen. Use osteopenia is noted. Bilateral Foot 7/31/2017: RIGHT: diffuse osteopenia. Non corticated erosions are present involving the second, fourth, and fifth MTP joints. Associated soft tissue swelling is present. The there may be an ankylosis of the fourth and third metatarsals to the midfoot. Flattening of the second metatarsal head is noted. There is a small Achilles and moderate the plantar spur. No fracture is seen. LEFT: diffuse osteopenia. Erosive changes are present at the second and third metatarsal tarsal joints. A small spur is noted at the Achilles insertion. No fractures identified. Less forefoot soft tissue swelling is present. No fracture is seen    Chest 1/30/2017: lungs remain clear. No pneumothorax or pleural effusion apparent. Cardiac silhouette remains large. Endotracheal tube and nasogastric tube have been removed. Left central line stable in position with tip projecting over the superior vena cava. Right Hip 4/26/2016: no fracture or dislocation. The right hip joint spaces normal and symmetric with the left side. Enthesophytes are seen along either iliac crest and there is relatively severe bilateral facet hypertrophy at L5-S1. The sacroiliac joints appear grossly normal.     Right Femur 4/26/2016: the patient is status post prior placement of a 3 component right total knee prosthesis. From this examination a full assessment of the prosthesis is limited. Grossly this examination is negative for a loosening of the prosthetic components, heterotopic ossification, or additional complications of the prosthesis. The joint spaces of the right hip are well maintained without significant osteoarthritis. This examination is negative for a fracture or an insufficiency fracture of the proximal femur. This examination examination is negative for focal lytic or blastic lesions of the right femur. CT Imaging    CT Abdomen and Pelvis without contrast 7/31/2017:there are linear densities at both lung bases suggesting scarring or subsegmental atelectasis. The lung bases are otherwise clear no pleural effusion is seen. The liver spleen and pancreas are unremarkable. No renal stone or mass is seen. No ureteral dilatation or stone is seen. The urinary bladder is unremarkable. A urinary catheter is noted with tip within the urinary bladder. Small calcifications within the uterus are again noted. The uterus and pelvic adnexa are otherwise unremarkable. No dilated bowel or free air or free fluid is seen. Specifically there is no evidence of retroperitoneal or intra-abdominal hemorrhage. An inferior vena cava filter is noted. MR Imaging    MRI Lumbar Spine with and without contrast 4/26/2016: study is suboptimal secondary to patient's body habitus. T12-L1: Normal for age. L1-L2: Normal for age. L2-L3: There is mild facet joint arthropathy. L3-L4: There is mild disc disease with concentric bulge. Moderate to severe facet arthropathy is present with bony hypertrophy and ligamentous thickening.  There is moderate central canal stenosis and lateral recess narrowing. Bilateral foraminal narrowing is present. There is absence of fat within the right neural foramen suggesting involvement of the exiting L3 nerve root. L4-L5: There is mild disc disease with loss of disc height. Moderate to severe facet arthropathy is present with bony hypertrophy and ligamentous thickening. There is moderate central canal stenosis primarily in a transverse direction. Severe lateral recess narrowing is present with moderate bilateral foraminal narrowing. Absence of fat within the right neural foramen suggests involvement of the exiting right L4 nerve root. L5-S1: There is mild disc degeneration with loss of disc height. Moderate to severe facet arthropathy is present with bony hypertrophy and ligamentous thickening. No significant canal stenosis. There is mild lateral recess narrowing. Bilateral neural foraminal narrowing is also present, right greater than left. Absence of fat within the right neural foramen suggests involvement of the exiting right L5 nerve root. Visualized portions of the sacrum are normal. There is no abnormal enhancement. The conus is normal in contour and signal characteristics. Paraspinal soft tissues are unremarkable. DXA     DXA 8/28/2017: (excluded None) showed lumbar spine L1-L4 T score 1.2 (BMD 1.345 g/cm2), left femoral neck T score -0.9 (0.919 g/cm2), left total hip T score: 0.1 (1.020 g/cm2), right femoral neck T score: -0.4 (0.977 g/cm2), right total hip T score: -0.6 (0.938 g/cm2), and distal one third left radius T score N/A (BMD N/A g/cm2). FRAX score 5.8 % probability in 10 years for major osteoporotic fracture and 0.4 % 10 year probability of hip fracture. ASSESSMENT AND PLAN    This is a follow up visit for Ms. Nicola Garcia. 1) Seropositive Erosive Rheumatoid Arthritis. She is maintained on methotrexate 25 mg subcutaneously every Wednesday and Actemra 162 mg every Monday with good tolerance.  She has breakthrough 3 days before her dose. Her CDAI was 17 (previously 6, 6, 14, 33.5, 42, 36, 17, 3, 5) with 4 tender and 6 swollen joints, consistent with moderate disease activity. She is due for her Actemra dose today. I will continue treatment. 2) Long Term Use of Immunosuppressants. The patient remains on immunomodulatory medications (methotrexate, Actemra) and requires frequent toxicity monitoring by blood work. 3) Vitamin D Deficiency. Her vitamin D level was 33.2 (previously 28.5, 48.1, 41.1, 24.7). She is on weekly ergocalciferol 50,000. I will check her level. 4) Morbid Obesity. Her BMI was 49.71 (previously 48.42, 46.97, 47.13, 48.10, 47.94, 48.74, 48.10, 47.78, 48.97, 48.74). Weight loss is recommended.     5) Fibromyalgia. Her history, constellation of symptoms, and examination are consistent with a pain syndrome, possiblity secondary to Rheumatoid Arthritis. This was not an active issue.    6) Leukopenia. Her WBC was 2.8 (previously 2.6, 2.8). This improved. I will repeat today. 7) CKD Stage 2. This resolved. Her eGFR was 86 (previously 82, 106, 87, 76). I will repeat today. 8) Positive Lupus Anticoagulant with negative Confirm. Her lupus anticoagulant confirm was negative (less than 1.5), therefore she does not have lupus anticoagulant related anti-phospholipid syndrome. The patient voiced understanding of the aforementioned assessment and plan. Summary of plan was provided in the After Visit Summary patient instructions.      TODAY'S ORDERS    Orders Placed This Encounter    VITAMIN D, 25 HYDROXY    CBC WITH AUTOMATED DIFF    METABOLIC PANEL, COMPREHENSIVE    C REACTIVE PROTEIN, QT    SED RATE (ESR)    methotrexate, PF, 25 mg/mL injection    folic acid (FOLVITE) 1 mg tablet    ergocalciferol (ERGOCALCIFEROL) 50,000 unit capsule    Insulin Syringe-Needle U-100 (DROPLET INSULIN SYRINGE) 1 mL 30 gauge x 5/16 syrg     Future Appointments   Date Time Provider Constance Abreu   3/2/2020  1:00 PM Julissa Jin Lashonda Lowry, [address] [facility], [place], [address] [facility]    Adult Rheumatology   Rheumatology Ultrasound Certified  [id number] [place], [address] [facility]   Phone [phone]  Fax [phone]

## 2019-12-03 DIAGNOSIS — M05.79 SEROPOSITIVE RHEUMATOID ARTHRITIS OF MULTIPLE SITES (HCC): ICD-10-CM

## 2019-12-03 DIAGNOSIS — Z79.60 LONG-TERM USE OF IMMUNOSUPPRESSANT MEDICATION: ICD-10-CM

## 2019-12-03 LAB
25(OH)D3+25(OH)D2 SERPL-MCNC: 30.7 NG/ML (ref 30–100)
ALBUMIN SERPL-MCNC: 4.3 G/DL (ref 3.6–4.8)
ALBUMIN/GLOB SERPL: 1.5 {RATIO} (ref 1.2–2.2)
ALP SERPL-CCNC: 70 IU/L (ref 39–117)
ALT SERPL-CCNC: 12 IU/L (ref 0–32)
AST SERPL-CCNC: 15 IU/L (ref 0–40)
BASOPHILS # BLD AUTO: 0 X10E3/UL (ref 0–0.2)
BASOPHILS NFR BLD AUTO: 2 %
BILIRUB SERPL-MCNC: 0.6 MG/DL (ref 0–1.2)
BUN SERPL-MCNC: 8 MG/DL (ref 8–27)
BUN/CREAT SERPL: 10 (ref 12–28)
CALCIUM SERPL-MCNC: 9.2 MG/DL (ref 8.7–10.3)
CHLORIDE SERPL-SCNC: 107 MMOL/L (ref 96–106)
CO2 SERPL-SCNC: 21 MMOL/L (ref 20–29)
CREAT SERPL-MCNC: 0.77 MG/DL (ref 0.57–1)
CRP SERPL-MCNC: <1 MG/L (ref 0–10)
EOSINOPHIL # BLD AUTO: 0 X10E3/UL (ref 0–0.4)
EOSINOPHIL NFR BLD AUTO: 2 %
ERYTHROCYTE [DISTWIDTH] IN BLOOD BY AUTOMATED COUNT: 13.2 % (ref 12.3–15.4)
ERYTHROCYTE [SEDIMENTATION RATE] IN BLOOD BY WESTERGREN METHOD: 9 MM/HR (ref 0–40)
GLOBULIN SER CALC-MCNC: 2.8 G/DL (ref 1.5–4.5)
GLUCOSE SERPL-MCNC: 87 MG/DL (ref 65–99)
HCT VFR BLD AUTO: 35.9 % (ref 34–46.6)
HGB BLD-MCNC: 12.1 G/DL (ref 11.1–15.9)
IMM GRANULOCYTES # BLD AUTO: 0 X10E3/UL (ref 0–0.1)
IMM GRANULOCYTES NFR BLD AUTO: 1 %
LYMPHOCYTES # BLD AUTO: 0.7 X10E3/UL (ref 0.7–3.1)
LYMPHOCYTES NFR BLD AUTO: 31 %
MCH RBC QN AUTO: 32.9 PG (ref 26.6–33)
MCHC RBC AUTO-ENTMCNC: 33.7 G/DL (ref 31.5–35.7)
MCV RBC AUTO: 98 FL (ref 79–97)
MONOCYTES # BLD AUTO: 0.3 X10E3/UL (ref 0.1–0.9)
MONOCYTES NFR BLD AUTO: 12 %
NEUTROPHILS # BLD AUTO: 1.2 X10E3/UL (ref 1.4–7)
NEUTROPHILS NFR BLD AUTO: 52 %
PLATELET # BLD AUTO: 194 X10E3/UL (ref 150–450)
POTASSIUM SERPL-SCNC: 4.3 MMOL/L (ref 3.5–5.2)
PROT SERPL-MCNC: 7.1 G/DL (ref 6–8.5)
RBC # BLD AUTO: 3.68 X10E6/UL (ref 3.77–5.28)
SODIUM SERPL-SCNC: 142 MMOL/L (ref 134–144)
WBC # BLD AUTO: 2.2 X10E3/UL (ref 3.4–10.8)

## 2019-12-04 RX ORDER — SYRINGE-NEEDLE,INSULIN,0.5 ML 30 GX5/16"
SYRINGE, EMPTY DISPOSABLE MISCELLANEOUS
Qty: 100 SYRINGE | Refills: 0 | Status: SHIPPED | OUTPATIENT
Start: 2019-12-04 | End: 2021-07-30 | Stop reason: SDUPTHER

## 2019-12-24 ENCOUNTER — DOCUMENTATION ONLY (OUTPATIENT)
Dept: RHEUMATOLOGY | Age: 69
End: 2019-12-24

## 2019-12-24 NOTE — PROGRESS NOTES
Office received fax from [facility] [employer] on 7/13/2019 stating Actemra 162 MG/0.9 ML syringe has been approved through 12/31/2019. Member ID, [id number].

## 2020-01-23 ENCOUNTER — TELEPHONE (OUTPATIENT)
Dept: RHEUMATOLOGY | Age: 70
End: 2020-01-23

## 2020-01-23 RX ORDER — PREDNISONE 5 MG/1
TABLET ORAL
Qty: 91 TAB | Refills: 0 | OUTPATIENT
Start: 2020-01-23 | End: 2020-02-22

## 2020-01-23 RX ORDER — PREDNISONE 5 MG/1
TABLET ORAL
Qty: 91 TAB | Refills: 0 | Status: SHIPPED | OUTPATIENT
Start: 2020-01-23 | End: 2020-03-02

## 2020-01-23 NOTE — TELEPHONE ENCOUNTER
Patient would like a call back from Dr. Tien Golden nurse due to she is having shoulder, foot and hand pain. This gets worse at night and it wakes her up. Her phone is [phone].

## 2020-01-23 NOTE — TELEPHONE ENCOUNTER
Spoke with pt who stated that her feet, shoulders, and hands are hurting her. She stated that it gets bad at night and it wakes her up. She stated that she cannot make a fist and that one of her hands is swollen. She is currently taking methotrexate injections and Actemra injections weekly. She wants to know if she can have some prednisone for the flare? Please advise.

## 2020-01-24 ENCOUNTER — TELEPHONE (OUTPATIENT)
Dept: RHEUMATOLOGY | Age: 70
End: 2020-01-24

## 2020-01-24 NOTE — TELEPHONE ENCOUNTER
Patient calling due to her [address] will not cover her Actemra. Her phone is [phone].

## 2020-01-27 DIAGNOSIS — M05.79 SEROPOSITIVE RHEUMATOID ARTHRITIS OF MULTIPLE SITES (HCC): ICD-10-CM

## 2020-01-27 NOTE — TELEPHONE ENCOUNTER
Pt called stating that her Actemra is not going to be covered by [facility]. I explained to her that a new auth needs to be completed to get her the medication. I told her that we will send a new script to [address] [place] [address] to help us with the auth. She stated an understanding.

## 2020-02-06 ENCOUNTER — DOCUMENTATION ONLY (OUTPATIENT)
Dept: RHEUMATOLOGY | Age: 70
End: 2020-02-06

## 2020-02-13 ENCOUNTER — DOCUMENTATION ONLY (OUTPATIENT)
Dept: PHARMACY | Age: 70
End: 2020-02-13

## 2020-02-13 NOTE — PROGRESS NOTES
[facility] at [address] [facility] Update    Date: 2/11/2020    Zachary Hernandez 1950     Medication: Actemra 162mg/0.9mL    Co-pay = $8.95    Fill: 2/6/2020    Ship: 2/10/2020    Deliver: 2/11/2020    Next Fill Due: 2/27/2020    Pharmacist counseled the patient on:        - Use  - Dosing  - Administration  - Side effects    Handout was provided.     Juan Oropeza, [address] at [facility],  [place], [address]  phone: [phone]   fax: [phone]

## 2020-03-02 ENCOUNTER — OFFICE VISIT (OUTPATIENT)
Dept: RHEUMATOLOGY | Age: 70
End: 2020-03-02

## 2020-03-02 VITALS
DIASTOLIC BLOOD PRESSURE: 92 MMHG | BODY MASS INDEX: 47.09 KG/M2 | RESPIRATION RATE: 18 BRPM | HEIGHT: 66 IN | WEIGHT: 293 LBS | HEART RATE: 76 BPM | SYSTOLIC BLOOD PRESSURE: 153 MMHG | TEMPERATURE: 98.3 F

## 2020-03-02 DIAGNOSIS — E55.9 VITAMIN D DEFICIENCY: ICD-10-CM

## 2020-03-02 DIAGNOSIS — R93.89 ABNORMAL FINDINGS ON DIAGNOSTIC IMAGING OF OTHER SPECIFIED BODY STRUCTURES: ICD-10-CM

## 2020-03-02 DIAGNOSIS — Z78.0 POST-MENOPAUSAL: ICD-10-CM

## 2020-03-02 DIAGNOSIS — M05.79 SEROPOSITIVE RHEUMATOID ARTHRITIS OF MULTIPLE SITES (HCC): Primary | ICD-10-CM

## 2020-03-02 DIAGNOSIS — Z79.60 LONG-TERM USE OF IMMUNOSUPPRESSANT MEDICATION: ICD-10-CM

## 2020-03-02 NOTE — PROGRESS NOTES
REASON FOR VISIT    This is a follow up visit for Ms. Gladys Hernandez for    ICD-10-CM   1. Seropositive rheumatoid arthritis of multiple sites ([facility] M05.79     Inflammatory arthritis phenotype includes:  Anti-CCP positive: yes (21)  Rheumatoid factor positive: yes (94.8)  Erosive disease: yes  Extra-articular manifestations include: none    Immunosuppression Screening (9/16/2019):   Quantiferon TB: negative  PPD: negative (2016)  PPD: negative (8/16/2017)  Hepatitis B: negative  Hepatitis C: negative    Therapy History includes:  Current DMARD therapy include: methotrexate 25 mg subcutaneously every Monday, Actemra 162 mg every Monday (11/2018 to 3/2019, 9/16/2019 to present)  Prior DMARD therapy include: gold (one year), methotrexate 20 mg (PO), Humira 40 mg every 14 days (10/31/2017), Enbrel every Monday (2/2018 - 6/11/2018), Lefty Del 11 mg XR (6/18/2018 to 9/17/2018),  Kevzara 200 mg every 14 days (3/19/2019 to 6/17/2019), Actemra 162 mg weekly Actemra 162 mg every 14 days (6/2019 to present; error; should be every 7 days)  Discontinued DMARDs because of inefficacy: gold, Enbrel, Xeljanz 11 mg XR, Kevzara  Discontinued DMARDs because of side effects: Humira (rash), Kevzara (hives)    Bone Density Historical Synopsis    Height loss since age 29 (at least two inches): 0  Fracture history includes: no  Family history of hip fracture: no  Fall Risk: no    Daily calcium intake is 0 mg  Weekly vitamin D intake is 50,000 IU    Smoking history: no  Alcohol consumption: no  Prednisone history: yes    Exercise: no    Previous work-up for osteoporosis includes the following:  DEXA Scan: 8/28/2017  Vitamin 25OH D level: 30.7 (12/02/2019)  PTH: None  TSH: 1.38 (7/31/2017)    Therapy History includes:  Current osteoporosis therapy includes: none  Prior osteoporosis therapy includes: none  The following osteoporosis therapy have been ineffective: none  The following osteoporosis therapy were stopped because of side effects: none    Immunizations:   Immunization History   Administered Date(s) Administered    Influenza Vaccine 09/24/2012, 11/15/2013, 08/15/2017    Influenza Vaccine PF 11/24/2014, 09/21/2016    Pneumococcal Conjugate (PCV-13) 08/28/2018    Pneumococcal Polysaccharide (PPSV-23) 08/15/2017    TB Skin Test (PPD) Intradermal 08/14/2017    Tdap 08/06/2018    Zoster Vaccine, Live 08/15/2017     Active problems include:    Patient Active Problem List   Diagnosis Code    Chronic pulmonary embolism (HCC) I27.82    Essential hypertension I10    DNR (do not resuscitate) Z66    Leg swelling M79.89    Seropositive rheumatoid arthritis of multiple sites ([facility] M05.79    Long-term use of immunosuppressant medication Z79.899    Morbid obesity with BMI of 45.0-49.9, adult ([facility] E66.01, Z68.42    Fibromyalgia M79.7    Vitamin D deficiency E55.9    CKD (chronic kidney disease) stage 2, GFR 60-89 ml/min N18.2     HISTORY OF PRESENT ILLNESS    Ms. Ivania Rhodes returns for a follow up visit. On her last visit, I continued methotrexate subcutaneous 25 mg weekly and Actemra every Monday, which she has taken with good tolerance. She denies breakthrough. She wants to have ankle surgery. She called on 1/23/2020 complaining of pain in her shoulders, feet, and hands. We prescribed prednisone taper without relief. Today, she feels good. She has morning stiffness in her hands lasting an hour without pain or swelling. She has trigger finger that she has to manually reduce. She     She denies fever, weight loss, blurred vision, vision loss, oral ulcers, ankle swelling, dry cough, dyspnea, nausea, vomiting, dysphagia, abdominal pain, black or bloody stool, fall since last visit, rash, easy bruising and increased thirst.    Ms. Ivania Rhodes has continued her medications for arthritis (methotrexate, Actemra) and reports good tolerance without significant side effects.     Last toxicity monitoring by blood work was done on 12/02/2019 and did not reveal any significant adverse effects, except WBC 2.2. Most recent inflammatory markers from 12/02/2019 revealed a ESR 9 mm/hr and CRP <1 mg/L. The patient has not had any interval hospital admissions, infections, or surgeries. REVIEW OF SYSTEMS    A comprehensive review of systems was performed and pertinent results are documented in the HPI, review of systems is otherwise non-contributory. PAST MEDICAL HISTORY    She has a past medical history of Anemia, Fibromyalgia, Hypertension, Osteoarthritis, Osteoporosis, Pulmonary embolism ([facility] and Rheumatoid arthritis ([facility] FAMILY HISTORY    Her family history includes Arthritis-rheumatoid in her mother; Cancer in her father; Diabetes in her maternal grandmother and son; Heart Attack in her paternal grandmother; Schizophrenia in her son. SOCIAL HISTORY    She reports that she has never smoked. She has never used smokeless tobacco. She reports that she does not drink alcohol or use drugs. MEDICATIONS    Current Outpatient Medications   Medication Sig Dispense Refill    tocilizumab (ACTEMRA) 162 mg/0.9 mL syringe 0.9 mL by SubCUTAneous route every Monday. Indications: rheumatoid arthritis 4 Syringe 11    Insulin Syringe-Needle U-100 (DROPLET INSULIN SYRINGE) 1 mL 30 gauge x 5/16 syrg USE TO INJECT METHOTREXATE ONE TIME WEEKLY 100 Syringe 0    methotrexate, PF, 25 mg/mL injection 1 mL by SubCUTAneous route every Monday. 12 Vial 0    folic acid (FOLVITE) 1 mg tablet TAKE 1 TABLET EVERY DAY 90 Tab 0    ergocalciferol (ERGOCALCIFEROL) 50,000 unit capsule Take 1 Cap by mouth every seven (7) days. 12 Cap 4    acetaminophen (TYLENOL ARTHRITIS PAIN) 650 mg TbER Take 650 mg by mouth as needed.  IRON, FERROUS SULFATE, PO Take 65 mg by mouth daily.  XARELTO 20 mg tab tablet Take 1 Tab by mouth daily (with breakfast).  (Patient taking differently: Take 15 mg by mouth every other day.) 90 Tab 0    multivitamin (ONE A DAY) tablet Take 1 Tab by mouth daily.  metoprolol tartrate (LOPRESSOR) 50 mg tablet two (2) times a day. ALLERGIES    Allergies   Allergen Reactions    Clindamycin Swelling and Other (comments)     fever    Humira [Adalimumab] Hives       PHYSICAL EXAMINATION    Visit Vitals  BP (!) 153/92   Pulse 76   Temp 98.3 °F (36.8 °C)   Resp 18   Ht 5' 6\" (1.676 m)   Wt 311 lb (141.1 kg)   BMI 50.20 kg/m²     Body mass index is 50.2 kg/m². General: Patient is alert, oriented x 3, not in acute distress, daughter at bedside    HEENT:   Sclerae are not injected and appear moist.  There is no alopecia. Neck is supple     Cardiovascular:  Heart is regular rate and rhythm, no murmurs. Chest:  Lungs are clear to auscultation bilaterally. Abdomen:  Obese. Extremities:  Free of clubbing, cyanosis, edema    Neurological exam:  Muscle strength is full in upper and lower extremities    Skin exam:  There are no rashes, no alopecia, no discoid lesions, no active Raynaud's, no livedo reticularis, no periungual erythema. Musculoskeletal exam:  A comprehensive musculoskeletal exam was performed for all joints of each upper and lower extremity and assessed for swelling, tenderness and range of motion.  Positive results are documented as below:    Tenderness of trapezius on right  Decreased ROM of wrists    Left elbow flexion deformity  Right 3rd PIP flexion deformity  Decreased ROM of ankles pain or swelling     Z-Deformities:   none  [place] Neck Deformities:  none  Boutonierre's Deformities:  none  Ulnar Deviation:   none     Joint Count 3/2/2020 12/2/2019 9/16/2019 6/17/2019 3/18/2019 12/17/2018 9/17/2018   Patient pain (0-100) 20 50 20 90 50 100 80   MHAQ 0 0 0 0 0.125 2 0.5   Left elbow - Tender - - - - - 1 -   Left elbow - Swollen - - - - - 1 -   Left wrist- Tender 0 1 - 1 - 1 1   Left wrist- Swollen 0 0 - 1 - 1 1   Left 1st MCP - Tender - - - 1 - - -   Left 1st MCP - Swollen - - - 0 - - -   Left 2nd MCP - Tender - - - 0 0 0 -   Left 2nd MCP - Swollen - - - 1 1 1 1   Left 3rd MCP - Tender - - - - 0 - -   Left 3rd MCP - Swollen - - - - 1 - -   Left 4th MCP - Tender - - - 1 - - 1   Left 4th MCP - Swollen - - - 0 - - -   Left 5th MCP - Tender - - - - - - 1   Left thumb IP - Tender - - - 1 - - 1   Left thumb IP - Swollen - - - 1 - - 1   Left 2nd PIP - Tender - - - 1 - 0 1   Left 2nd PIP - Swollen - - - 1 - 1 1   Left 3rd PIP - Tender - 1 - 1 - 1 1   Left 3rd PIP - Swollen - 1 - 1 - 1 1   Left 4th PIP - Tender - 1 - 1 - 1 1   Left 4th PIP - Swollen - 1 - 1 - 1 -   Left 5th PIP - Tender - 1 - 1 - 1 1   Left 5th PIP - Swollen - 1 - 1 - 1 -   Right wrist- Tender - 0 0 0 - 0 1   Right wrist- Swollen - 1 1 1 - 1 1   Right 1st MCP - Tender - - - - - - 1   Right 2nd MCP - Tender - - - - - - 1   Right 2nd MCP - Swollen - - - - - - 1   Right 3rd MCP - Tender - - - - - - 0   Right 3rd MCP - Swollen - - - - - - 1   Right 4th MCP - Swollen - - - - - - -   Right 5th MCP - Tender - - - - - - 1   Right 5th MCP - Swollen - - - - - - 1   Right 2nd PIP - Tender - - 0 1 1 1 1   Right 2nd PIP - Swollen - - 1 1 1 1 1   Right 3rd PIP - Tender - 0 0 1 1 0 1   Right 3rd PIP - Swollen - 1 1 1 1 1 1   Right 4th PIP - Tender - - - 1 1 1 1   Right 4th PIP - Swollen - - - 1 1 1 1   Right 5th PIP - Tender - 0 - 1 1 1 1   Right 5th PIP - Swollen - 1 - 1 1 1 1   Tender Joint Count (Total) 0 4 0 12 4 8 16   Swollen Joint Count (Total) 0 6 3 12 6 12 13   Physician Assessment (0-10) 0 2 1 4 2 4 4   Patient Assessment (0-10) 2 5 2 2 2 9.5 9   CDAI Total (calculated) 2 17 6 30 14 33.5 42       DATA REVIEW    Laboratory     Recent laboratory results were reviewed, summarized, and discussed with the patient. Imaging    Musculoskeletal Ultrasound    None    Radiographs    Chest 3/15/2018: clear lungs. The cardiac and mediastinal contours and pulmonary vascularity are normal.  The bones and soft tissues are within normal limits. Bilateral Hand 7/31/2017: RIGHT: no fracture.  There is diffuse osteopenia. Both corticated and non corticated erosive changes are present involving the radiocarpal joint, carpal joints, and metacarpal carpal joints. In addition, there is involvement of the first and second metacarpal phalangeal joints, most pronounced in the first. The lunate and radius appear ankylosed. LEFT:  no fracture. There is diffuse osteopenia. Both corticated and non corticated erosive changes are present involving the radiocarpal joint, carpal joints, and metacarpal carpal joints. The lunate and radius appear ankylosed. Some erosive changes are also present in the third PIP joint. The scaphoid lunate distance is widened. Left Elbow 7/31/2017: marked flattening of the radial head with sclerosis. In erosive changes also present of the proximal ulna. A moderate joint effusion is seen. Use osteopenia is noted. Bilateral Foot 7/31/2017: RIGHT: diffuse osteopenia. Non corticated erosions are present involving the second, fourth, and fifth MTP joints. Associated soft tissue swelling is present. The there may be an ankylosis of the fourth and third metatarsals to the midfoot. Flattening of the second metatarsal head is noted. There is a small Achilles and moderate the plantar spur. No fracture is seen. LEFT: diffuse osteopenia. Erosive changes are present at the second and third metatarsal tarsal joints. A small spur is noted at the Achilles insertion. No fractures identified. Less forefoot soft tissue swelling is present. No fracture is seen    Chest 1/30/2017: lungs remain clear. No pneumothorax or pleural effusion apparent. Cardiac silhouette remains large. Endotracheal tube and nasogastric tube have been removed. Left central line stable in position with tip projecting over the superior vena cava. Right Hip 4/26/2016: no fracture or dislocation. The right hip joint spaces normal and symmetric with the left side.  Enthesophytes are seen along either iliac crest and there is relatively severe bilateral facet hypertrophy at L5-S1. The sacroiliac joints appear grossly normal.     Right Femur 4/26/2016: the patient is status post prior placement of a 3 component right total knee prosthesis. From this examination a full assessment of the prosthesis is limited. Grossly this examination is negative for a loosening of the prosthetic components, heterotopic ossification, or additional complications of the prosthesis. The joint spaces of the right hip are well maintained without significant osteoarthritis. This examination is negative for a fracture or an insufficiency fracture of the proximal femur. This examination examination is negative for focal lytic or blastic lesions of the right femur. CT Imaging    CT Abdomen and Pelvis without contrast 7/31/2017:there are linear densities at both lung bases suggesting scarring or subsegmental atelectasis. The lung bases are otherwise clear no pleural effusion is seen. The liver spleen and pancreas are unremarkable. No renal stone or mass is seen. No ureteral dilatation or stone is seen. The urinary bladder is unremarkable. A urinary catheter is noted with tip within the urinary bladder. Small calcifications within the uterus are again noted. The uterus and pelvic adnexa are otherwise unremarkable. No dilated bowel or free air or free fluid is seen. Specifically there is no evidence of retroperitoneal or intra-abdominal hemorrhage. An inferior vena cava filter is noted. MR Imaging    MRI Lumbar Spine with and without contrast 4/26/2016: study is suboptimal secondary to patient's body habitus. T12-L1: Normal for age. L1-L2: Normal for age. L2-L3: There is mild facet joint arthropathy. L3-L4: There is mild disc disease with concentric bulge. Moderate to severe facet arthropathy is present with bony hypertrophy and ligamentous thickening. There is moderate central canal stenosis and lateral recess narrowing. Bilateral foraminal narrowing is present.  There is absence of fat within the right neural foramen suggesting involvement of the exiting L3 nerve root. L4-L5: There is mild disc disease with loss of disc height. Moderate to severe facet arthropathy is present with bony hypertrophy and ligamentous thickening. There is moderate central canal stenosis primarily in a transverse direction. Severe lateral recess narrowing is present with moderate bilateral foraminal narrowing. Absence of fat within the right neural foramen suggests involvement of the exiting right L4 nerve root. L5-S1: There is mild disc degeneration with loss of disc height. Moderate to severe facet arthropathy is present with bony hypertrophy and ligamentous thickening. No significant canal stenosis. There is mild lateral recess narrowing. Bilateral neural foraminal narrowing is also present, right greater than left. Absence of fat within the right neural foramen suggests involvement of the exiting right L5 nerve root. Visualized portions of the sacrum are normal. There is no abnormal enhancement. The conus is normal in contour and signal characteristics. Paraspinal soft tissues are unremarkable. DXA     DXA 8/28/2017: (excluded None) showed lumbar spine L1-L4 T score 1.2 (BMD 1.345 g/cm2), left femoral neck T score -0.9 (0.919 g/cm2), left total hip T score: 0.1 (1.020 g/cm2), right femoral neck T score: -0.4 (0.977 g/cm2), right total hip T score: -0.6 (0.938 g/cm2), and distal one third left radius T score N/A (BMD N/A g/cm2). FRAX score 5.8 % probability in 10 years for major osteoporotic fracture and 0.4 % 10 year probability of hip fracture. ASSESSMENT AND PLAN    This is a follow up visit for Ms. Allison Scales. 1) Seropositive Erosive Rheumatoid Arthritis. She is maintained on methotrexate 25 mg subcutaneously every Wednesday and Actemra 162 mg every Monday with good tolerance. She denies breakthrough.  Her CDAI was 2 (previously 17, 6, 6, 14, 33.5, 42, 36, 17, 3, 5) with 0 tender and 0 swollen joints, consistent with remission. She complains of neck muscle pain which I explained is not her Rheumatoid Arthritis. I will continue treatment. Labs today. I will continue treatment. 2) Long Term Use of Immunosuppressants. The patient remains on immunomodulatory medications (methotrexate, Actemra) and requires frequent toxicity monitoring by blood work. 3) Vitamin D Deficiency. Her vitamin D level was 30.7 (previously 33.2, 28.5, 48.1, 41.1, 24.7). She is on weekly ergocalciferol 50,000. I will check her level. 4) Morbid Obesity. Her BMI was 49.71 (previously 48.42, 46.97, 47.13, 48.10, 47.94, 48.74, 48.10, 47.78, 48.97, 48.74). Weight loss is recommended.     5) Fibromyalgia. Her history, constellation of symptoms, and examination are consistent with a pain syndrome, possiblity secondary to Rheumatoid Arthritis. This was not an active issue.    6) Leukopenia. Her WBC was 2.2 and likely from Actemra. This is stable. I will repeat today. 7) CKD Stage 2. This resolved. Her eGFR was 86 (previously 82, 106, 87, 76). I will repeat today. 8) Positive Lupus Anticoagulant with negative Confirm. Her lupus anticoagulant confirm was negative (less than 1.5), therefore she does not have lupus anticoagulant related anti-phospholipid syndrome. 9) Post Menopausal. I ordered a DXA. 10) Musculoskeletal Neck Pain. This is likely form her poor posture and forward stooping neck posture. The etiology is usually from chronic slouching due to reading, smart phone use, video suzanne, and/or computer work. This will cause muscular tension and spasms, which may result with referred headaches. This is not an inflammatory process nor is it an immune mediated condition. It is mechanical and should be treated with targeted physical therapy with the goal of realigning the neck and shoulder girdle into the normal anatomic alignment. Muscle relaxants and analgesics should be used as supportive treatment.  I recommend physical therapy. The patient voiced understanding of the aforementioned assessment and plan. Summary of plan was provided in the After Visit Summary patient instructions.      TODAY'S ORDERS    Orders Placed This Encounter    DEXA BONE DENSITY STUDY AXIAL    CBC WITH AUTOMATED DIFF    METABOLIC PANEL, COMPREHENSIVE    C REACTIVE PROTEIN, QT    SED RATE (ESR)    VITAMIN D, 25 HYDROXY    PROTEIN ELECTROPHORESIS W/ REFLX BELGICA    PTH INTACT    TSH REFLEX TO T4     Future Appointments   Date Time Provider Department Center   8/31/2020  2:00 PM Abran Bear MD [address], [place], [facility]    Adult Rheumatology   Rheumatology Ultrasound Certified  [address] [address]  [address] [address]   Phone [phone]  Fax [phone]

## 2020-03-02 NOTE — LETTER
3/2/20 Patient: Irineo Blake YOB: 1950 Date of Visit: 3/2/2020 Nydia Cintron MD 
[address] [place] [address] [address] [facility] 7 [address] VIA Facsimile: [phone] Dear Nydia Cintron MD, Thank you for referring Ms. Serenity Anthony to [facility] for evaluation. My notes for this consultation are attached. If you have questions, please do not hesitate to call me. I look forward to following your patient along with you.  
 
 
Sincerely, 
 
Ruddy Jeffries MD

## 2020-03-02 NOTE — PROGRESS NOTES
Chief Complaint   Patient presents with    Arthritis     1. Have you been to the ER, urgent care clinic since your last visit? Hospitalized since your last visit? No    2. Have you seen or consulted any other health care providers outside of the [address] [place] [address] since your last visit? Include any pap smears or colon screening.  No

## 2020-03-04 LAB
25(OH)D3+25(OH)D2 SERPL-MCNC: 30.8 NG/ML (ref 30–100)
ALBUMIN SERPL ELPH-MCNC: 3.9 G/DL (ref 2.9–4.4)
ALBUMIN SERPL-MCNC: 4.3 G/DL (ref 3.8–4.8)
ALBUMIN/GLOB SERPL: 1.1 {RATIO} (ref 0.7–1.7)
ALBUMIN/GLOB SERPL: 1.4 {RATIO} (ref 1.2–2.2)
ALP SERPL-CCNC: 60 IU/L (ref 39–117)
ALPHA1 GLOB SERPL ELPH-MCNC: 0.2 G/DL (ref 0–0.4)
ALPHA2 GLOB SERPL ELPH-MCNC: 0.6 G/DL (ref 0.4–1)
ALT SERPL-CCNC: 15 IU/L (ref 0–32)
AST SERPL-CCNC: 18 IU/L (ref 0–40)
B-GLOBULIN SERPL ELPH-MCNC: 1.1 G/DL (ref 0.7–1.3)
BASOPHILS # BLD AUTO: 0 X10E3/UL (ref 0–0.2)
BASOPHILS NFR BLD AUTO: 1 %
BILIRUB SERPL-MCNC: 0.8 MG/DL (ref 0–1.2)
BUN SERPL-MCNC: 9 MG/DL (ref 8–27)
BUN/CREAT SERPL: 11 (ref 12–28)
CALCIUM SERPL-MCNC: 9.3 MG/DL (ref 8.7–10.3)
CHLORIDE SERPL-SCNC: 108 MMOL/L (ref 96–106)
CO2 SERPL-SCNC: 20 MMOL/L (ref 20–29)
CREAT SERPL-MCNC: 0.84 MG/DL (ref 0.57–1)
CRP SERPL-MCNC: <1 MG/L (ref 0–10)
EOSINOPHIL # BLD AUTO: 0.1 X10E3/UL (ref 0–0.4)
EOSINOPHIL NFR BLD AUTO: 3 %
ERYTHROCYTE [DISTWIDTH] IN BLOOD BY AUTOMATED COUNT: 13.8 % (ref 11.7–15.4)
ERYTHROCYTE [SEDIMENTATION RATE] IN BLOOD BY WESTERGREN METHOD: 19 MM/HR (ref 0–40)
GAMMA GLOB SERPL ELPH-MCNC: 1.5 G/DL (ref 0.4–1.8)
GLOBULIN SER CALC-MCNC: 3 G/DL (ref 1.5–4.5)
GLOBULIN SER CALC-MCNC: 3.4 G/DL (ref 2.2–3.9)
GLUCOSE SERPL-MCNC: 95 MG/DL (ref 65–99)
HCT VFR BLD AUTO: 36.7 % (ref 34–46.6)
HGB BLD-MCNC: 12.4 G/DL (ref 11.1–15.9)
IMM GRANULOCYTES # BLD AUTO: 0 X10E3/UL (ref 0–0.1)
IMM GRANULOCYTES NFR BLD AUTO: 1 %
LYMPHOCYTES # BLD AUTO: 0.6 X10E3/UL (ref 0.7–3.1)
LYMPHOCYTES NFR BLD AUTO: 32 %
M PROTEIN SERPL ELPH-MCNC: NORMAL G/DL
MCH RBC QN AUTO: 33 PG (ref 26.6–33)
MCHC RBC AUTO-ENTMCNC: 33.8 G/DL (ref 31.5–35.7)
MCV RBC AUTO: 98 FL (ref 79–97)
MONOCYTES # BLD AUTO: 0.2 X10E3/UL (ref 0.1–0.9)
MONOCYTES NFR BLD AUTO: 14 %
NEUTROPHILS # BLD AUTO: 0.9 X10E3/UL (ref 1.4–7)
NEUTROPHILS NFR BLD AUTO: 49 %
PLATELET # BLD AUTO: 184 X10E3/UL (ref 150–450)
PLEASE NOTE, 011150: NORMAL
POTASSIUM SERPL-SCNC: 4.1 MMOL/L (ref 3.5–5.2)
PROT PATTERN SERPL ELPH-IMP: NORMAL
PROT SERPL-MCNC: 7.3 G/DL (ref 6–8.5)
PTH-INTACT SERPL-MCNC: 58 PG/ML (ref 15–65)
RBC # BLD AUTO: 3.76 X10E6/UL (ref 3.77–5.28)
SODIUM SERPL-SCNC: 145 MMOL/L (ref 134–144)
TSH SERPL DL<=0.005 MIU/L-ACNC: 0.97 UIU/ML (ref 0.45–4.5)
WBC # BLD AUTO: 1.8 X10E3/UL (ref 3.4–10.8)

## 2020-03-05 NOTE — PROGRESS NOTES
The results were reviewed. Stable leukopenia/neutropenia from Kevzara --> will monitor due to no increase of interval infections. All remaining labs are normal/negative.

## 2020-03-13 ENCOUNTER — DOCUMENTATION ONLY (OUTPATIENT)
Dept: PHARMACY | Age: 70
End: 2020-03-13

## 2020-03-13 NOTE — PROGRESS NOTES
[facility] [address] [facility] Update     Date: 3/11/2020     Reny Sans 1950      Medication: Actemra 162mg/0.9mL     Co-pay = $0     Fill: 3/10/2020     Ship: 3/10/2020     Deliver: 3/11/2020     Next Fill Due: 3/31/2020     Pharmacist offered counseling.     Jennifer Edwards, [address] at [facility],  [place], [address]  phone: [phone]   fax: [phone]

## 2020-03-24 DIAGNOSIS — M05.79 SEROPOSITIVE RHEUMATOID ARTHRITIS OF MULTIPLE SITES (HCC): ICD-10-CM

## 2020-03-24 DIAGNOSIS — Z79.60 LONG-TERM USE OF IMMUNOSUPPRESSANT MEDICATION: ICD-10-CM

## 2020-03-24 RX ORDER — METHOTREXATE 25 MG/ML
INJECTION INTRA-ARTERIAL; INTRAMUSCULAR; INTRATHECAL; INTRAVENOUS
Qty: 24 ML | Refills: 2 | Status: SHIPPED | OUTPATIENT
Start: 2020-03-24 | End: 2020-11-02

## 2020-04-03 ENCOUNTER — DOCUMENTATION ONLY (OUTPATIENT)
Dept: PHARMACY | Age: 70
End: 2020-04-03

## 2020-04-03 NOTE — PROGRESS NOTES
[facility] at [address] [facility] Update     Date: 4/1/2020     Zachary Rajesh Peterson 1950      Medication: Actemra 162mg/0.9mL     Co-pay = $0     Fill: 3/31/2020     Ship: 3/31/2020     Deliver: 4/1/2020     Next Fill Due: 4/14/2020     Pharmacist offered counseling.     Laurel Goodpasture, [address] at [facility],  [place], [address]  phone: [phone]   fax: [phone]

## 2020-04-24 ENCOUNTER — DOCUMENTATION ONLY (OUTPATIENT)
Dept: PHARMACY | Age: 70
End: 2020-04-24

## 2020-04-24 NOTE — PROGRESS NOTES
[facility] at [address] [facility] Update     Date: 4/22/2020     Mendel Kayser A Mitchell 1950      Medication: Actemra 162mg/0.9mL     Co-pay = $0     Fill: 4/21/2020     Ship: 4/21/2020     Deliver: 4/22/2020     Next Fill Due: 5/21/2020     Pharmacist offered counseling.     Vivi Paul, [address] at [facility], [address] [place] [address], [address]  phone: [phone]   fax: [phone]

## 2020-06-01 ENCOUNTER — HOSPITAL ENCOUNTER (OUTPATIENT)
Dept: MAMMOGRAPHY | Age: 70
Discharge: HOME | End: 2020-06-01
Attending: INTERNAL MEDICINE
Payer: MEDICARE

## 2020-06-01 DIAGNOSIS — E55.9 VITAMIN D DEFICIENCY: ICD-10-CM

## 2020-06-01 DIAGNOSIS — Z78.0 POST-MENOPAUSAL: ICD-10-CM

## 2020-06-01 PROCEDURE — 77080 DXA BONE DENSITY AXIAL: CPT

## 2020-06-01 NOTE — PROGRESS NOTES
Pt notified of bone density scan results. Pt informed that we will need to place her on prolia injections once every 6 months. She stated an understanding and an order was faxed to [facility].

## 2020-06-04 ENCOUNTER — DOCUMENTATION ONLY (OUTPATIENT)
Dept: PHARMACY | Age: 70
End: 2020-06-04

## 2020-06-04 NOTE — PROGRESS NOTES
[facility] at [address] [facility] Update     Date: 5/22/2020     Pari Mar Nicholas 1950      Medication: Actemra 162mg/0.9mL     Co-pay = $0     Fill: 5/21/2020     Ship: 5/21/2020     Deliver: 5/22/2020     Next Fill Due: 6/11/2020     Pharmacist offered counseling.     Ted Fairbanks, [address] at [facility],  [place], [address]  phone: [phone]   fax: [phone]

## 2020-06-08 ENCOUNTER — HOSPITAL ENCOUNTER (OUTPATIENT)
Dept: INFUSION THERAPY | Age: 70
Discharge: HOME | End: 2020-06-08

## 2020-06-12 ENCOUNTER — DOCUMENTATION ONLY (OUTPATIENT)
Dept: PHARMACY | Age: 70
End: 2020-06-12

## 2020-06-12 NOTE — PROGRESS NOTES
[facility] at [address] [facility] Update    Date: 06/12/20    Amisha Cooley 1950     Medication: Actemra 162 mg/0.9 ml syringes    Co-pay = $0    Fill: 6/11/20    Ship: 6/11/20    Deliver: 6/12/20    Next Fill Due: 7/2/20    Pharmacist counseled the patient on:        - Use  - Dosing  - Administration  - Side effects    Handout was provided.     Tee Delgado, [address] at [facility], [address]   [place], [address]  phone: [phone]   fax: [phone]

## 2020-06-22 ENCOUNTER — HOSPITAL ENCOUNTER (OUTPATIENT)
Dept: INFUSION THERAPY | Age: 70
Discharge: HOME | End: 2020-06-22

## 2020-07-06 ENCOUNTER — TELEPHONE (OUTPATIENT)
Dept: RHEUMATOLOGY | Age: 70
End: 2020-07-06

## 2020-07-06 NOTE — TELEPHONE ENCOUNTER
Pt does not have my chart activated. She made an in office visit for tomorrow 7/7/20 to be evaluated.

## 2020-07-06 NOTE — TELEPHONE ENCOUNTER
Pt called stating that she has broken out in hives all over her legs, arm and her back. She stated that she thinks it is from her Actemra. She takes her actemra on Wednesdays. She noticed the hives on Tuesday last week. She stated that they are getting worse. They are red bumps that are itchy. She is taking benadryl and has had them before so she knows they are hives. She has not started any new medications recently. She is ok with waiting until her next appt on 8/31 to see you. Please advise what she should do or if you need to see her now. She has not taken her actemra this week.

## 2020-07-07 ENCOUNTER — OFFICE VISIT (OUTPATIENT)
Dept: RHEUMATOLOGY | Age: 70
End: 2020-07-07

## 2020-07-07 VITALS
DIASTOLIC BLOOD PRESSURE: 91 MMHG | HEIGHT: 66 IN | BODY MASS INDEX: 47.09 KG/M2 | RESPIRATION RATE: 18 BRPM | SYSTOLIC BLOOD PRESSURE: 159 MMHG | WEIGHT: 293 LBS | HEART RATE: 85 BPM | TEMPERATURE: 97.5 F

## 2020-07-07 DIAGNOSIS — D72.819 LEUKOPENIA, UNSPECIFIED TYPE: ICD-10-CM

## 2020-07-07 DIAGNOSIS — M05.79 SEROPOSITIVE RHEUMATOID ARTHRITIS OF MULTIPLE SITES (HCC): ICD-10-CM

## 2020-07-07 DIAGNOSIS — E55.9 VITAMIN D DEFICIENCY: ICD-10-CM

## 2020-07-07 DIAGNOSIS — R21 RASH AND NONSPECIFIC SKIN ERUPTION: Primary | ICD-10-CM

## 2020-07-07 DIAGNOSIS — M81.0 AGE-RELATED OSTEOPOROSIS WITHOUT CURRENT PATHOLOGICAL FRACTURE: ICD-10-CM

## 2020-07-07 DIAGNOSIS — Z79.60 LONG-TERM USE OF IMMUNOSUPPRESSANT MEDICATION: ICD-10-CM

## 2020-07-07 RX ORDER — FOLIC ACID 1 MG/1
TABLET ORAL
Qty: 90 TAB | Refills: 0 | Status: SHIPPED | OUTPATIENT
Start: 2020-07-07 | End: 2020-09-04 | Stop reason: SDUPTHER

## 2020-07-07 RX ORDER — DENOSUMAB 60 MG/ML
60 INJECTION SUBCUTANEOUS
COMMUNITY

## 2020-07-07 NOTE — LETTER
7/7/20 Patient: Rhoda Enrique YOB: 1950 Date of Visit: 7/7/2020 Starla Joel MD 
[facility] [address] [facility] 7 [address] VIA Facsimile: [phone] Dear Starla Joel MD, Thank you for referring Ms. Obdulia Ramires to [address] [place] [address] for evaluation. My notes for this consultation are attached. If you have questions, please do not hesitate to call me. I look forward to following your patient along with you.  
 
 
Sincerely, 
 
Donovan Medina MD

## 2020-07-07 NOTE — PATIENT INSTRUCTIONS
Stop using your new body wash and let me know if this resolves your rash. Hold Actemra until rash resolves.

## 2020-07-07 NOTE — PROGRESS NOTES
REASON FOR VISIT    This is an acute follow up visit for Ms. Jeannette Dsouza for    ICD-10-CM   1. Seropositive rheumatoid arthritis of multiple sites ([facility] M05.79     Inflammatory arthritis phenotype includes:  Anti-CCP positive: yes (21)  Rheumatoid factor positive: yes (94.8)  Erosive disease: yes  Extra-articular manifestations include: none    Immunosuppression Screening (9/16/2019):   Quantiferon TB: negative  PPD: negative (2016)  PPD: negative (8/16/2017)  Hepatitis B: negative  Hepatitis C: negative    Therapy History includes:  Current DMARD therapy include: methotrexate 25 mg subcutaneously every Monday, Actemra 162 mg every Monday (11/2018 to 3/2019, 9/16/2019 to present)  Prior DMARD therapy include: gold (one year), methotrexate 20 mg (PO), Humira 40 mg every 14 days (10/31/2017), Enbrel every Monday (2/2018 - 6/11/2018), Olena Luciano 11 mg XR (6/18/2018 to 9/17/2018),  Kevzara 200 mg every 14 days (3/19/2019 to 6/17/2019), Actemra 162 mg weekly Actemra 162 mg every 14 days (6/2019 to present; error; should be every 7 days)  Discontinued DMARDs because of inefficacy: gold, Enbrel, Xeljanz 11 mg XR, Kevzara  Discontinued DMARDs because of side effects: Humira (rash), Kevzara (hives)    Bone Density Historical Synopsis    Height loss since age 29 (at least two inches): 0  Fracture history includes: no  Family history of hip fracture: no  Fall Risk: no    Daily calcium intake is 0 mg  Weekly vitamin D intake is 50,000 IU    Smoking history: no  Alcohol consumption: no  Prednisone history: yes    Exercise: no    Previous work-up for osteoporosis includes the following:  DEXA Scan: 6/01/2020  Vitamin 25OH D level: 30.8 (3/02/2020)  PTH: 58 (3/02/2020)  TSH: 0.970 (3/02/2020)    Therapy History includes:  Current osteoporosis therapy includes: none  Prior osteoporosis therapy includes: none  The following osteoporosis therapy have been ineffective: none  The following osteoporosis therapy were stopped because of side effects: none    Immunizations:   Immunization History   Administered Date(s) Administered    Influenza Vaccine 09/24/2012, 11/15/2013, 08/15/2017    Influenza Vaccine PF 11/24/2014, 09/21/2016    Pneumococcal Conjugate (PCV-13) 08/28/2018    Pneumococcal Polysaccharide (PPSV-23) 08/15/2017    TB Skin Test (PPD) Intradermal 08/14/2017    Tdap 08/06/2018    Zoster Vaccine, Live 08/15/2017     Active problems include:    Patient Active Problem List   Diagnosis Code    Chronic pulmonary embolism (HCC) I27.82    Essential hypertension I10    DNR (do not resuscitate) Z66    Leg swelling M79.89    Seropositive rheumatoid arthritis of multiple sites ([facility] M05.79    Long-term use of immunosuppressant medication Z79.899    Morbid obesity with BMI of 45.0-49.9, adult ([facility] E66.01, Z68.42    Fibromyalgia M79.7    Vitamin D deficiency E55.9    CKD (chronic kidney disease) stage 2, GFR 60-89 ml/min N18.2    Age-related osteoporosis without current pathological fracture M81.0     HISTORY OF PRESENT ILLNESS    Ms. Sulaiman Hall returns for an acute follow up visit. On her last visit, I continued methotrexate 25 mg subcutaneous every Monday and Actemra 162 mg every Monday, which she has taken with good tolerance. She denies breakthrough. I also ordered a DXA which showed osteoporosis of her wrist so I started Prolia but it as denied. She called yesterday reporting a rash on her legs. Today, she feels good apart from a rash. She developed red itching spots on her legs last week on Tuesday the day after her Actemra. It also involved her back and arm. Benadryl has helped. She did not take her Actemra yesterday. They have not improved since last week. She denies infection or new detergent. She is using a new body wash called Tropical about the same time.     She denies fever, weight loss, blurred vision, vision loss, oral ulcers, ankle swelling, dry cough, dyspnea, nausea, vomiting, dysphagia, abdominal pain, black or bloody stool, fall since last visit, easy bruising and increased thirst.    Last toxicity monitoring by blood work was done on 3/02/2020 and did not reveal any significant adverse effects, except WBC 1.8     Most recent inflammatory markers from 3/02/2020 revealed a ESR 19 mm/hr and CRP <1 mg/L. The patient has not had any interval hospital admissions, infections, or surgeries. REVIEW OF SYSTEMS    A comprehensive review of systems was performed and pertinent results are documented in the HPI, review of systems is otherwise non-contributory. PAST MEDICAL HISTORY    She has a past medical history of Anemia, Fibromyalgia, Hypertension, Osteoarthritis, Osteoporosis, Pulmonary embolism ([facility] and Rheumatoid arthritis ([facility] FAMILY HISTORY    Her family history includes Arthritis-rheumatoid in her mother; Cancer in her father; Diabetes in her maternal grandmother and son; Heart Attack in her paternal grandmother; Schizophrenia in her son. SOCIAL HISTORY    She reports that she has never smoked. She has never used smokeless tobacco. She reports that she does not drink alcohol or use drugs. MEDICATIONS    Current Outpatient Medications   Medication Sig Dispense Refill    folic acid (FOLVITE) 1 mg tablet TAKE 1 TABLET EVERY DAY 90 Tab 0    methotrexate, PF, 25 mg/mL injection INJECT 1 ML SUBCUTANEOUSLY EVERY MONDAY. VIAL IS FOR SINGLE USE ONLY, DISCARD IMMEDIATELY AFTER USE  24 mL 2    tocilizumab (ACTEMRA) 162 mg/0.9 mL syringe 0.9 mL by SubCUTAneous route every Monday. Indications: rheumatoid arthritis 4 Syringe 11    Insulin Syringe-Needle U-100 (DROPLET INSULIN SYRINGE) 1 mL 30 gauge x 5/16 syrg USE TO INJECT METHOTREXATE ONE TIME WEEKLY 100 Syringe 0    ergocalciferol (ERGOCALCIFEROL) 50,000 unit capsule Take 1 Cap by mouth every seven (7) days. 12 Cap 4    acetaminophen (TYLENOL ARTHRITIS PAIN) 650 mg TbER Take 650 mg by mouth as needed.       IRON, FERROUS SULFATE, PO Take 65 mg by mouth daily.      XARELTO 20 mg tab tablet Take 1 Tab by mouth daily (with breakfast). (Patient taking differently: Take 15 mg by mouth every other day.) 90 Tab 0    multivitamin (ONE A DAY) tablet Take 1 Tab by mouth daily.  metoprolol tartrate (LOPRESSOR) 50 mg tablet two (2) times a day.  denosumab (Prolia) 60 mg/mL injection 60 mg by SubCUTAneous route. ALLERGIES    Allergies   Allergen Reactions    Clindamycin Swelling and Other (comments)     fever    Humira [Adalimumab] Hives       PHYSICAL EXAMINATION    Visit Vitals  BP (!) 159/91   Pulse 85   Temp 97.5 °F (36.4 °C)   Resp 18   Ht 5' 6\" (1.676 m)   Wt 308 lb (139.7 kg)   BMI 49.71 kg/m²     Body mass index is 49.71 kg/m². General: Patient is alert, oriented x 3, not in acute distress    HEENT:   Sclerae are not injected and appear moist.  There is no alopecia. Neck is supple     Chest:  Breathing comfortably at room air    Skin exam:  Few erythematous non-blanchable small papules on legs with some on right elbow    Musculoskeletal exam:  A comprehensive musculoskeletal exam was NOT performed for all joints of each upper and lower extremity and assessed for swelling, tenderness and range of motion.  Positive results are documented as below:    Previous Exam    Tenderness of trapezius on right  Decreased ROM of wrists    Left elbow flexion deformity  Right 3rd PIP flexion deformity  Decreased ROM of ankles pain or swelling     Z-Deformities:   none  [place] Neck Deformities:  none  [place]'s Deformities:  none  Ulnar Deviation:   none     Joint Count 7/7/2020 3/2/2020 12/2/2019 9/16/2019 6/17/2019 3/18/2019 12/17/2018   Patient pain (0-100) 5 20 50 20 90 50 100   MHAQ 0 0 0 0 0 0.125 2   Left elbow - Tender - - - - - - 1   Left elbow - Swollen - - - - - - 1   Left wrist- Tender - 0 1 - 1 - 1   Left wrist- Swollen - 0 0 - 1 - 1   Left 1st MCP - Tender - - - - 1 - -   Left 1st MCP - Swollen - - - - 0 - -   Left 2nd MCP - Tender - - - - 0 0 0 Left 2nd MCP - Swollen - - - - 1 1 1   Left 3rd MCP - Tender - - - - - 0 -   Left 3rd MCP - Swollen - - - - - 1 -   Left 4th MCP - Tender - - - - 1 - -   Left 4th MCP - Swollen - - - - 0 - -   Left 5th MCP - Tender - - - - - - -   Left thumb IP - Tender - - - - 1 - -   Left thumb IP - Swollen - - - - 1 - -   Left 2nd PIP - Tender - - - - 1 - 0   Left 2nd PIP - Swollen - - - - 1 - 1   Left 3rd PIP - Tender - - 1 - 1 - 1   Left 3rd PIP - Swollen - - 1 - 1 - 1   Left 4th PIP - Tender - - 1 - 1 - 1   Left 4th PIP - Swollen - - 1 - 1 - 1   Left 5th PIP - Tender - - 1 - 1 - 1   Left 5th PIP - Swollen - - 1 - 1 - 1   Right wrist- Tender - - 0 0 0 - 0   Right wrist- Swollen - - 1 1 1 - 1   Right 1st MCP - Tender - - - - - - -   Right 2nd MCP - Tender - - - - - - -   Right 2nd MCP - Swollen - - - - - - -   Right 3rd MCP - Tender - - - - - - -   Right 3rd MCP - Swollen - - - - - - -   Right 4th MCP - Swollen - - - - - - -   Right 5th MCP - Tender - - - - - - -   Right 5th MCP - Swollen - - - - - - -   Right 2nd PIP - Tender - - - 0 1 1 1   Right 2nd PIP - Swollen - - - 1 1 1 1   Right 3rd PIP - Tender - - 0 0 1 1 0   Right 3rd PIP - Swollen - - 1 1 1 1 1   Right 4th PIP - Tender - - - - 1 1 1   Right 4th PIP - Swollen - - - - 1 1 1   Right 5th PIP - Tender - - 0 - 1 1 1   Right 5th PIP - Swollen - - 1 - 1 1 1   Tender Joint Count (Total) - 0 4 0 12 4 8   Swollen Joint Count (Total) - 0 6 3 12 6 12   Physician Assessment (0-10) - 0 2 1 4 2 4   Patient Assessment (0-10) 0.5 2 5 2 2 2 9.5   CDAI Total (calculated) - 2 17 6 30 14 33.5       DATA REVIEW    Laboratory     Recent laboratory results were reviewed, summarized, and discussed with the patient. Imaging    Musculoskeletal Ultrasound    None    Radiographs    Chest 3/15/2018: clear lungs. The cardiac and mediastinal contours and pulmonary vascularity are normal.  The bones and soft tissues are within normal limits. Bilateral Hand 7/31/2017: RIGHT: no fracture. There is diffuse osteopenia. Both corticated and non corticated erosive changes are present involving the radiocarpal joint, carpal joints, and metacarpal carpal joints. In addition, there is involvement of the first and second metacarpal phalangeal joints, most pronounced in the first. The lunate and radius appear ankylosed. LEFT:  no fracture. There is diffuse osteopenia. Both corticated and non corticated erosive changes are present involving the radiocarpal joint, carpal joints, and metacarpal carpal joints. The lunate and radius appear ankylosed. Some erosive changes are also present in the third PIP joint. The scaphoid lunate distance is widened. Left Elbow 7/31/2017: marked flattening of the radial head with sclerosis. In erosive changes also present of the proximal ulna. A moderate joint effusion is seen. Use osteopenia is noted. Bilateral Foot 7/31/2017: RIGHT: diffuse osteopenia. Non corticated erosions are present involving the second, fourth, and fifth MTP joints. Associated soft tissue swelling is present. The there may be an ankylosis of the fourth and third metatarsals to the midfoot. Flattening of the second metatarsal head is noted. There is a small Achilles and moderate the plantar spur. No fracture is seen. LEFT: diffuse osteopenia. Erosive changes are present at the second and third metatarsal tarsal joints. A small spur is noted at the Achilles insertion. No fractures identified. Less forefoot soft tissue swelling is present. No fracture is seen    Chest 1/30/2017: lungs remain clear. No pneumothorax or pleural effusion apparent. Cardiac silhouette remains large. Endotracheal tube and nasogastric tube have been removed. Left central line stable in position with tip projecting over the superior vena cava. Right Hip 4/26/2016: no fracture or dislocation. The right hip joint spaces normal and symmetric with the left side.  Enthesophytes are seen along either iliac crest and there is relatively severe bilateral facet hypertrophy at L5-S1. The sacroiliac joints appear grossly normal.     Right Femur 4/26/2016: the patient is status post prior placement of a 3 component right total knee prosthesis. From this examination a full assessment of the prosthesis is limited. Grossly this examination is negative for a loosening of the prosthetic components, heterotopic ossification, or additional complications of the prosthesis. The joint spaces of the right hip are well maintained without significant osteoarthritis. This examination is negative for a fracture or an insufficiency fracture of the proximal femur. This examination examination is negative for focal lytic or blastic lesions of the right femur. CT Imaging    CT Abdomen and Pelvis without contrast 7/31/2017:there are linear densities at both lung bases suggesting scarring or subsegmental atelectasis. The lung bases are otherwise clear no pleural effusion is seen. The liver spleen and pancreas are unremarkable. No renal stone or mass is seen. No ureteral dilatation or stone is seen. The urinary bladder is unremarkable. A urinary catheter is noted with tip within the urinary bladder. Small calcifications within the uterus are again noted. The uterus and pelvic adnexa are otherwise unremarkable. No dilated bowel or free air or free fluid is seen. Specifically there is no evidence of retroperitoneal or intra-abdominal hemorrhage. An inferior vena cava filter is noted. MR Imaging    MRI Lumbar Spine with and without contrast 4/26/2016: study is suboptimal secondary to patient's body habitus. T12-L1: Normal for age. L1-L2: Normal for age. L2-L3: There is mild facet joint arthropathy. L3-L4: There is mild disc disease with concentric bulge. Moderate to severe facet arthropathy is present with bony hypertrophy and ligamentous thickening. There is moderate central canal stenosis and lateral recess narrowing.  Bilateral foraminal narrowing is present. There is absence of fat within the right neural foramen suggesting involvement of the exiting L3 nerve root. L4-L5: There is mild disc disease with loss of disc height. Moderate to severe facet arthropathy is present with bony hypertrophy and ligamentous thickening. There is moderate central canal stenosis primarily in a transverse direction. Severe lateral recess narrowing is present with moderate bilateral foraminal narrowing. Absence of fat within the right neural foramen suggests involvement of the exiting right L4 nerve root. L5-S1: There is mild disc degeneration with loss of disc height. Moderate to severe facet arthropathy is present with bony hypertrophy and ligamentous thickening. No significant canal stenosis. There is mild lateral recess narrowing. Bilateral neural foraminal narrowing is also present, right greater than left. Absence of fat within the right neural foramen suggests involvement of the exiting right L5 nerve root. Visualized portions of the sacrum are normal. There is no abnormal enhancement. The conus is normal in contour and signal characteristics. Paraspinal soft tissues are unremarkable. DXA     DXA 6/01/2020: (excluded None) lumbar spine L1-L4 T score 1.7 (BMD 1.402 g/cm2), left femoral neck T score: -0.6 (0.864 g/cm2), left total hip T score: 0.2 (1.027 g/cm2), right femoral neck T score: -1.3 (0.864 g/cm2), right total hip T score: -0.7 (0.921 g/cm2), and distal one third left radius T score -3.3 (BMD 0.588 g/cm2). FRAX score 6.6 % probability in 10 years for major osteoporotic fracture and 0.7 % 10 year probability of hip fracture. I personally reviewed the images of this study.     DXA 8/28/2017: (excluded None) showed lumbar spine L1-L4 T score 1.2 (BMD 1.345 g/cm2), left femoral neck T score -0.9 (0.919 g/cm2), left total hip T score: 0.1 (1.020 g/cm2), right femoral neck T score: -0.4 (0.977 g/cm2), right total hip T score: -0.6 (0.938 g/cm2), and distal one third left radius T score N/A (BMD N/A g/cm2). FRAX score 5.8 % probability in 10 years for major osteoporotic fracture and 0.4 % 10 year probability of hip fracture. ASSESSMENT AND PLAN    This is an acute follow up visit for Ms. Nicola Garcia. 1) Seropositive Erosive Rheumatoid Arthritis. She is maintained on methotrexate 25 mg subcutaneously every Wednesday and Actemra 162 mg every Monday with good tolerance. She denies breakthrough. Her CDAI was previously 2 (previously 17, 6, 6, 14, 33.5, 42, 36, 17, 3, 5) with 0 tender and 0 swollen joints, consistent with remission. She developed a pruritic rash on her legs, arms and back last week Tuesday. Her Actemra dose was Monday. She did not take her dose today. Her rash has not improved. She also started using a new body wash last week, so is uncertain if that is related to it. Her rash on her legs is scarce but are non-blanchable papules. She has more lesion on left leg than right and a few around her right elbow. I asked her to stop using her body wash and to continue holding Actemra and to inform me next week if her rash improves. She has been on Actemra for more than a year, so unlikely, but possible. I ordered a CBCD to evaluate for cytopenia. 2) Long Term Use of Immunosuppressants. The patient remains on immunomodulatory medications (methotrexate, Actemra) and requires frequent toxicity monitoring by blood work. 3) Vitamin D Deficiency. Her vitamin D level was 30.8 (previously 30.7, 33.2, 28.5, 48.1, 41.1, 24.7). She is on weekly ergocalciferol 50,000.     4) Morbid Obesity. Her BMI was 49.71 (previously 48.42, 46.97, 47.13, 48.10, 47.94, 48.74, 48.10, 47.78, 48.97, 48.74). Weight loss is recommended.     5) Fibromyalgia. Her history, constellation of symptoms, and examination are consistent with a pain syndrome, possiblity secondary to Rheumatoid Arthritis. This was not an active issue.    6) Leukopenia.  Her WBC was 1.8 (previously 2.2) and likely from Actemra. This is stable. I will repeat. 7) CKD Stage 2. This resolved. Her eGFR was 83. 8) Positive Lupus Anticoagulant with negative Confirm. Her lupus anticoagulant confirm was negative (less than 1.5), therefore she does not have lupus anticoagulant related anti-phospholipid syndrome. 9) Osteoporosis. Her DXA 6/01/2020 showed distal one third left radius T score -3.3 (BMD 0.588 g/cm2). I started Prolia but it was denied for unknown reason. Will resubmit. 10) Musculoskeletal Neck Pain. I recommend physical therapy. The patient voiced understanding of the aforementioned assessment and plan. Summary of plan was provided in the After Visit Summary patient instructions.      TODAY'S ORDERS    Orders Placed This Encounter    CBC WITH AUTOMATED DIFF    folic acid (FOLVITE) 1 mg tablet    denosumab (Prolia) 60 mg/mL injection     Future Appointments   Date Time Provider Constance Abreu   8/31/2020  2:00 PM MD Emerson Dunn MD, [address]    Adult Rheumatology   Rheumatology Ultrasound Certified  [facility]  A Part of [facility], [place], operated by [facility], [address] [place] [address]   Phone [phone]  Fax [phone]

## 2020-07-08 ENCOUNTER — TELEPHONE (OUTPATIENT)
Dept: RHEUMATOLOGY | Age: 70
End: 2020-07-08

## 2020-07-08 NOTE — TELEPHONE ENCOUNTER
I am speaking with rep. Salbador Rodgers at [facility] and christo about the denial of Prolia for this PT. Terence Abbott. Questionnaire in the Appeals department is going to be done over the phone now with Donnie to move process quicker. Finished with appeals and should have a answer within 7 days. Terence Abbott

## 2020-07-09 ENCOUNTER — TELEPHONE (OUTPATIENT)
Dept: RHEUMATOLOGY | Age: 70
End: 2020-07-09

## 2020-07-09 NOTE — TELEPHONE ENCOUNTER
Decision of Appeal and grievances overturn the denial and was approved P/Carlitos in the [facility] for PT's Prolia 06/08/2020-12/30/2020. Prolia 60mg .  [employer]

## 2020-07-14 ENCOUNTER — TELEPHONE (OUTPATIENT)
Dept: RHEUMATOLOGY | Age: 70
End: 2020-07-14

## 2020-07-20 ENCOUNTER — TELEPHONE (OUTPATIENT)
Dept: RHEUMATOLOGY | Age: 70
End: 2020-07-20

## 2020-07-20 ENCOUNTER — HOSPITAL ENCOUNTER (OUTPATIENT)
Dept: INFUSION THERAPY | Age: 70
Discharge: HOME | End: 2020-07-20
Payer: MEDICARE

## 2020-07-20 VITALS
HEART RATE: 75 BPM | SYSTOLIC BLOOD PRESSURE: 124 MMHG | DIASTOLIC BLOOD PRESSURE: 84 MMHG | TEMPERATURE: 96.9 F | RESPIRATION RATE: 18 BRPM

## 2020-07-20 LAB
ALBUMIN SERPL-MCNC: 3.8 G/DL (ref 3.5–5)
ANION GAP SERPL CALC-SCNC: 6 MMOL/L (ref 5–15)
BUN SERPL-MCNC: 9 MG/DL (ref 6–20)
BUN/CREAT SERPL: 11 (ref 12–20)
CALCIUM SERPL-MCNC: 8.7 MG/DL (ref 8.5–10.1)
CHLORIDE SERPL-SCNC: 107 MMOL/L (ref 97–108)
CO2 SERPL-SCNC: 25 MMOL/L (ref 21–32)
CREAT SERPL-MCNC: 0.82 MG/DL (ref 0.55–1.02)
GLUCOSE SERPL-MCNC: 90 MG/DL (ref 65–100)
MAGNESIUM SERPL-MCNC: 2.2 MG/DL (ref 1.6–2.4)
PHOSPHATE SERPL-MCNC: 3.4 MG/DL (ref 2.6–4.7)
PHOSPHATE SERPL-MCNC: 3.5 MG/DL (ref 2.6–4.7)
POTASSIUM SERPL-SCNC: 4.2 MMOL/L (ref 3.5–5.1)
SODIUM SERPL-SCNC: 138 MMOL/L (ref 136–145)

## 2020-07-20 PROCEDURE — 80069 RENAL FUNCTION PANEL: CPT

## 2020-07-20 PROCEDURE — 36415 COLL VENOUS BLD VENIPUNCTURE: CPT

## 2020-07-20 PROCEDURE — 84100 ASSAY OF PHOSPHORUS: CPT

## 2020-07-20 PROCEDURE — 96372 THER/PROPH/DIAG INJ SC/IM: CPT

## 2020-07-20 PROCEDURE — 74011250636 HC RX REV CODE- 250/636: Performed by: INTERNAL MEDICINE

## 2020-07-20 PROCEDURE — 83735 ASSAY OF MAGNESIUM: CPT

## 2020-07-20 RX ADMIN — DENOSUMAB 60 MG: 60 INJECTION SUBCUTANEOUS at 16:21

## 2020-07-20 NOTE — TELEPHONE ENCOUNTER
PT was called today to place on schedule for next injection of Prolia. [employer] pharmacy approved by Raymona Chirag at THE [facility] for the period of 06/08/2020-12/31/2020. Next injectioon will be at [facility] at rheumatology.  RW

## 2020-07-20 NOTE — PROGRESS NOTES
Outpatient Infusion Center Progress Note    1500 Pt admit to [facility] for Prolia ambulatory in stable condition. Assessment completed. No new concerns voiced. Labs drawn per order. 1st dose education provided; question/concerns addressed. Visit Vitals  /84   Pulse 75   Temp 96.9 °F (36.1 °C)   Resp 18       Medications:  Medications Administered     denosumab (PROLIA) injection 60 mg     Admin Date  07/20/2020 Action  Given Dose  60 mg Route  SubCUTAneous Administered By  Citlali Paredes RN                  1625 Pt tolerated treatment well. D/c home ambulatory in no distress.  Pt aware of next appointment scheduled for   Future Appointments   Date Time Provider Constance Abreu   8/31/2020  2:00 PM Russell Cannon MD [address] [facility] [facility]   1/18/2021  3:00 PM [address]     Recent Results (from the past 12 hour(s))   RENAL FUNCTION PANEL    Collection Time: 07/20/20  3:06 PM   Result Value Ref Range    Sodium 138 136 - 145 mmol/L    Potassium 4.2 3.5 - 5.1 mmol/L    Chloride 107 97 - 108 mmol/L    CO2 25 21 - 32 mmol/L    Anion gap 6 5 - 15 mmol/L    Glucose 90 65 - 100 mg/dL    BUN 9 6 - 20 MG/DL    Creatinine 0.82 0.55 - 1.02 MG/DL    BUN/Creatinine ratio 11 (L) 12 - 20      GFR est AA >60 >60 ml/min/1.73m2    GFR est non-AA >60 >60 ml/min/1.73m2    Calcium 8.7 8.5 - 10.1 MG/DL    Phosphorus 3.4 2.6 - 4.7 MG/DL    Albumin 3.8 3.5 - 5.0 g/dL   MAGNESIUM    Collection Time: 07/20/20  3:06 PM   Result Value Ref Range    Magnesium 2.2 1.6 - 2.4 mg/dL   PHOSPHORUS    Collection Time: 07/20/20  3:06 PM   Result Value Ref Range    Phosphorus 3.5 2.6 - 4.7 MG/DL

## 2020-07-24 ENCOUNTER — DOCUMENTATION ONLY (OUTPATIENT)
Dept: PHARMACY | Age: 70
End: 2020-07-24

## 2020-07-24 NOTE — PROGRESS NOTES
[facility] at [address] [facility] Update     Date: 7/24/2020     Em Peterson 1950      Medication: Actemra 162mg/0.9mL     Co-pay = $0     Fill: 7/223/2020     Ship: 7/23/2020     Deliver: 7/24/2020     Next Fill Due: 8/20/2020     Pharmacist offered counseling.     Neena Mitchell, [address] at [facility],  [place], [address]  phone: [phone]   fax: [phone]

## 2020-07-29 DIAGNOSIS — M05.79 SEROPOSITIVE RHEUMATOID ARTHRITIS OF MULTIPLE SITES (HCC): ICD-10-CM

## 2020-07-29 DIAGNOSIS — D72.819 LEUKOPENIA, UNSPECIFIED TYPE: ICD-10-CM

## 2020-08-20 ENCOUNTER — TELEPHONE (OUTPATIENT)
Dept: RHEUMATOLOGY | Age: 70
End: 2020-08-20

## 2020-09-04 ENCOUNTER — DOCUMENTATION ONLY (OUTPATIENT)
Dept: PHARMACY | Age: 70
End: 2020-09-04

## 2020-09-04 DIAGNOSIS — Z79.60 LONG-TERM USE OF IMMUNOSUPPRESSANT MEDICATION: ICD-10-CM

## 2020-09-04 DIAGNOSIS — M05.79 SEROPOSITIVE RHEUMATOID ARTHRITIS OF MULTIPLE SITES (HCC): ICD-10-CM

## 2020-09-04 RX ORDER — FOLIC ACID 1 MG/1
TABLET ORAL
Qty: 90 TAB | Refills: 0 | Status: SHIPPED | OUTPATIENT
Start: 2020-09-04 | End: 2020-11-05

## 2020-09-04 NOTE — PROGRESS NOTES
[facility] at [address] [facility] Update    Date: 09/04/20    Shaye [facility] 1950     Medication: Actemra 162 mg/0.9 ml syringes     Co-pay = $0    Fill: 8/20/20    Ship: 8/20/20    Deliver: 8/21/20    Next Fill Due: 9/10/20    Pharmacist counseled the patient on:        - Use  - Dosing  - Administration  - Side effects    Handout was provided.     Jose Lopez, [address] at [facility],  [place], [address]  phone: [phone]   fax: [phone]

## 2020-09-23 ENCOUNTER — DOCUMENTATION ONLY (OUTPATIENT)
Dept: PHARMACY | Age: 70
End: 2020-09-23

## 2020-09-23 NOTE — PROGRESS NOTES
[facility] at [address] [facility] Update     Date: 9/23/2020     Agatha Peterson 1950      Medication: Actemra 162mg/0.9mL     Co-pay = $0     Fill: 9/10/2020     Ship: 9/22/2020     Deliver: 9/23/2020     Next Fill Due: 10/26/2020     Pharmacist offered counseling.     Florecita Davenport, [address] at [facility],  [place], [address]  phone: [phone]   fax: [phone]

## 2020-10-07 ENCOUNTER — OFFICE VISIT (OUTPATIENT)
Dept: RHEUMATOLOGY | Age: 70
End: 2020-10-07
Payer: MEDICARE

## 2020-10-07 VITALS
DIASTOLIC BLOOD PRESSURE: 92 MMHG | BODY MASS INDEX: 47.09 KG/M2 | WEIGHT: 293 LBS | TEMPERATURE: 97.7 F | HEIGHT: 66 IN | RESPIRATION RATE: 20 BRPM | SYSTOLIC BLOOD PRESSURE: 146 MMHG | HEART RATE: 85 BPM

## 2020-10-07 DIAGNOSIS — R93.89 ABNORMAL FINDINGS ON DIAGNOSTIC IMAGING OF OTHER SPECIFIED BODY STRUCTURES: ICD-10-CM

## 2020-10-07 DIAGNOSIS — M81.0 AGE-RELATED OSTEOPOROSIS WITHOUT CURRENT PATHOLOGICAL FRACTURE: ICD-10-CM

## 2020-10-07 DIAGNOSIS — Z79.60 LONG-TERM USE OF IMMUNOSUPPRESSANT MEDICATION: ICD-10-CM

## 2020-10-07 DIAGNOSIS — E55.9 VITAMIN D DEFICIENCY: ICD-10-CM

## 2020-10-07 DIAGNOSIS — M05.79 SEROPOSITIVE RHEUMATOID ARTHRITIS OF MULTIPLE SITES (HCC): Primary | ICD-10-CM

## 2020-10-07 PROCEDURE — 1090F PRES/ABSN URINE INCON ASSESS: CPT | Performed by: INTERNAL MEDICINE

## 2020-10-07 PROCEDURE — G8755 DIAS BP > OR = 90: HCPCS | Performed by: INTERNAL MEDICINE

## 2020-10-07 PROCEDURE — 99215 OFFICE O/P EST HI 40 MIN: CPT | Performed by: INTERNAL MEDICINE

## 2020-10-07 PROCEDURE — G8753 SYS BP > OR = 140: HCPCS | Performed by: INTERNAL MEDICINE

## 2020-10-07 PROCEDURE — G8417 CALC BMI ABV UP PARAM F/U: HCPCS | Performed by: INTERNAL MEDICINE

## 2020-10-07 PROCEDURE — G8536 NO DOC ELDER MAL SCRN: HCPCS | Performed by: INTERNAL MEDICINE

## 2020-10-07 PROCEDURE — G8427 DOCREV CUR MEDS BY ELIG CLIN: HCPCS | Performed by: INTERNAL MEDICINE

## 2020-10-07 PROCEDURE — 3017F COLORECTAL CA SCREEN DOC REV: CPT | Performed by: INTERNAL MEDICINE

## 2020-10-07 PROCEDURE — G8432 DEP SCR NOT DOC, RNG: HCPCS | Performed by: INTERNAL MEDICINE

## 2020-10-07 PROCEDURE — 1101F PT FALLS ASSESS-DOCD LE1/YR: CPT | Performed by: INTERNAL MEDICINE

## 2020-10-07 NOTE — LETTER
10/7/20 Patient: Sheree Quintanilla YOB: 1950 Date of Visit: 10/7/2020 Maple Severin, MD 
[address] [address] VIA Facsimile: [phone] Dear Maple Severin, MD, Thank you for referring Ms. Elmer Garcia to [facility] for evaluation. My notes for this consultation are attached. If you have questions, please do not hesitate to call me. I look forward to following your patient along with you.  
 
 
Sincerely, 
 
Vu Lombardo MD

## 2020-10-07 NOTE — PROGRESS NOTES
REASON FOR VISIT    This is an acute follow up visit for Ms. Jyothi Bains for    ICD-10-CM   1. Seropositive rheumatoid arthritis of multiple sites ([facility] M05.79     Inflammatory arthritis phenotype includes:  Anti-CCP positive: yes (21)  Rheumatoid factor positive: yes (94.8)  Erosive disease: yes  Extra-articular manifestations include: none    Immunosuppression Screening (9/16/2019):   Quantiferon TB: negative  PPD: negative (2016)  PPD: negative (8/16/2017)  Hepatitis B: negative  Hepatitis C: negative     Therapy History includes:  Current DMARD therapy include: methotrexate 25 mg subcutaneously every Monday, Actemra 162 mg every Monday (11/2018 to 3/2019, 9/16/2019 to present)  Prior DMARD therapy include: gold (one year), methotrexate 20 mg (PO), Humira 40 mg every 14 days (10/31/2017), Enbrel every Monday (2/2018 - 6/11/2018), Toni Eans 11 mg XR (6/18/2018 to 9/17/2018),  Kevzara 200 mg every 14 days (3/19/2019 to 6/17/2019), Actemra 162 mg weekly Actemra 162 mg every 14 days (6/2019 to present; error; should be every 7 days)  Discontinued DMARDs because of inefficacy: gold, Enbrel, Xeljanz 11 mg XR, Kevzara  Discontinued DMARDs because of side effects: Humira (rash), Kevzara (hives)    Bone Density Historical Synopsis    Height loss since age 29 (at least two inches): 0  Fracture history includes: no  Family history of hip fracture: no  Fall Risk: no    Daily calcium intake is 0 mg  Weekly vitamin D intake is 50,000 IU    Smoking history: no  Alcohol consumption: no  Prednisone history: yes    Exercise: no    Previous work-up for osteoporosis includes the following:  DEXA Scan: 6/01/2020  Vitamin 25OH D level: 30.8 (3/02/2020)  PTH: 58 (3/02/2020)  TSH: 0.970 (3/02/2020)    Therapy History includes:  Current osteoporosis therapy includes: Prolia 60 mg every 6 months (7/20/2020 to present)  Prior osteoporosis therapy includes: none  The following osteoporosis therapy have been ineffective: none  The following osteoporosis therapy were stopped because of side effects: none    Immunizations:   Immunization History   Administered Date(s) Administered    Influenza Vaccine 09/24/2012, 11/15/2013, 08/15/2017    Influenza Vaccine PF 11/24/2014, 09/21/2016    Pneumococcal Conjugate (PCV-13) 08/28/2018    Pneumococcal Polysaccharide (PPSV-23) 08/15/2017    TB Skin Test (PPD) Intradermal 08/14/2017    Tdap 08/06/2018    Zoster Vaccine, Live 08/15/2017     Active problems include:    Patient Active Problem List   Diagnosis Code    Chronic pulmonary embolism ([facility] I27.82    Essential hypertension I10    DNR (do not resuscitate) Z66    Leg swelling M79.89    Seropositive rheumatoid arthritis of multiple sites ([facility] M05.79    Long-term use of immunosuppressant medication Z79.899    Morbid obesity with BMI of 45.0-49.9, adult ([facility] E66.01, Z68.42    Fibromyalgia M79.7    Vitamin D deficiency E55.9    CKD (chronic kidney disease) stage 2, GFR 60-89 ml/min N18.2    Age-related osteoporosis without current pathological fracture M81.0     HISTORY OF PRESENT ILLNESS    Ms. Malu Palma returns for an acute follow up visit. On her last visit, I continued methotrexate 25 mg subcutaneous every Monday and Actemra 162 mg every Monday, which she has taken with good tolerance. She denies breakthrough. Her rash resolved. I stared pRolia which se received on 7/20/2020. Today, she feels well. She denies pain, swelling, or stiffness in her joints.  She had an asymptomatic boil under her breast treated with antibiotics which resolved I.t    She denies fever, weight loss, blurred vision, vision loss, oral ulcers, ankle swelling, dry cough, dyspnea, nausea, vomiting, dysphagia, abdominal pain, black or bloody stool, fall since last visit, easy bruising and increased thirst.    Last toxicity monitoring by blood work was done on 3/02/2020 and did not reveal any significant adverse effects, except WBC 1.8     Most recent inflammatory markers from 3/02/2020 revealed a ESR 19 mm/hr and CRP <1 mg/L. The patient has not had any interval hospital admissions, infections, or surgeries. REVIEW OF SYSTEMS    A comprehensive review of systems was performed and pertinent results are documented in the HPI, review of systems is otherwise non-contributory. PAST MEDICAL HISTORY    She has a past medical history of Anemia, Fibromyalgia, Hypertension, Osteoarthritis, Osteoporosis, Pulmonary embolism ([address] and Rheumatoid arthritis ([facility] FAMILY HISTORY    Her family history includes Arthritis-rheumatoid in her mother; Cancer in her father; Diabetes in her maternal grandmother and son; Heart Attack in her paternal grandmother; Schizophrenia in her son. SOCIAL HISTORY    She reports that she has never smoked. She has never used smokeless tobacco. She reports that she does not drink alcohol or use drugs. MEDICATIONS    Current Outpatient Medications   Medication Sig Dispense Refill    folic acid (FOLVITE) 1 mg tablet TAKE 1 TABLET EVERY DAY 90 Tab 0    denosumab (Prolia) 60 mg/mL injection 60 mg by SubCUTAneous route every 6 months.  methotrexate, PF, 25 mg/mL injection INJECT 1 ML SUBCUTANEOUSLY EVERY MONDAY. VIAL IS FOR SINGLE USE ONLY, DISCARD IMMEDIATELY AFTER USE  24 mL 2    tocilizumab (ACTEMRA) 162 mg/0.9 mL syringe 0.9 mL by SubCUTAneous route every Monday. Indications: rheumatoid arthritis 4 Syringe 11    Insulin Syringe-Needle U-100 (DROPLET INSULIN SYRINGE) 1 mL 30 gauge x 5/16 syrg USE TO INJECT METHOTREXATE ONE TIME WEEKLY 100 Syringe 0    ergocalciferol (ERGOCALCIFEROL) 50,000 unit capsule Take 1 Cap by mouth every seven (7) days. 12 Cap 4    acetaminophen (TYLENOL ARTHRITIS PAIN) 650 mg TbER Take 650 mg by mouth as needed.  IRON, FERROUS SULFATE, PO Take 65 mg by mouth daily.  XARELTO 20 mg tab tablet Take 1 Tab by mouth daily (with breakfast).  (Patient taking differently: Take 15 mg by mouth every other day.) 90 Tab 0    multivitamin (ONE A DAY) tablet Take 1 Tab by mouth daily.  metoprolol tartrate (LOPRESSOR) 50 mg tablet two (2) times a day. ALLERGIES    Allergies   Allergen Reactions    Clindamycin Swelling and Other (comments)     fever    Humira [Adalimumab] Hives       PHYSICAL EXAMINATION    Visit Vitals  BP (!) 146/92   Pulse 85   Temp 97.7 °F (36.5 °C)   Resp 20   Ht 5' 6\" (1.676 m)   Wt 303 lb (137.4 kg)   BMI 48.91 kg/m²     Body mass index is 48.91 kg/m². General: Patient is alert, oriented x 3, not in acute distress    HEENT:   Sclerae are not injected and appear moist.  There is no alopecia. Neck is supple     Cardiovascular:  Heart is regular rate and rhythm, no murmurs. Chest:  Lungs are clear to auscultation bilaterally. Musculoskeletal exam:  A comprehensive musculoskeletal exam was performed for all joints of each upper and lower extremity and assessed for swelling, tenderness and range of motion.  Positive results are documented as below:    Tenderness of trapezius on right  Decreased ROM of wrists    Left elbow flexion deformity  Right 3rd PIP flexion deformity  Decreased ROM of ankles pain or swelling (RESOLVED)    Z-Deformities:   none  [place] Deformities:  none  [place]'s Deformities:  none  Ulnar Deviation:   none     Joint Count 10/7/2020 3/2/2020 12/2/2019 9/16/2019 6/17/2019 3/18/2019 12/17/2018   Patient pain (0-100) 0 20 50 20 90 50 100   MHAQ 0 0 0 0 0 0.125 2   Left elbow - Tender - - - - - - 1   Left elbow - Swollen - - - - - - 1   Left wrist- Tender 0 0 1 - 1 - 1   Left wrist- Swollen 0 0 0 - 1 - 1   Left 1st MCP - Tender - - - - 1 - -   Left 1st MCP - Swollen - - - - 0 - -   Left 2nd MCP - Tender - - - - 0 0 0   Left 2nd MCP - Swollen - - - - 1 1 1   Left 3rd MCP - Tender - - - - - 0 -   Left 3rd MCP - Swollen - - - - - 1 -   Left 4th MCP - Tender - - - - 1 - -   Left 4th MCP - Swollen - - - - 0 - -   Left 5th MCP - Tender - - - - - - -   Left thumb IP - Tender - - - - 1 - -   Left thumb IP - Swollen - - - - 1 - -   Left 2nd PIP - Tender - - - - 1 - 0   Left 2nd PIP - Swollen - - - - 1 - 1   Left 3rd PIP - Tender - - 1 - 1 - 1   Left 3rd PIP - Swollen - - 1 - 1 - 1   Left 4th PIP - Tender - - 1 - 1 - 1   Left 4th PIP - Swollen - - 1 - 1 - 1   Left 5th PIP - Tender - - 1 - 1 - 1   Left 5th PIP - Swollen - - 1 - 1 - 1   Right wrist- Tender - - 0 0 0 - 0   Right wrist- Swollen - - 1 1 1 - 1   Right 1st MCP - Tender - - - - - - -   Right 2nd MCP - Tender - - - - - - -   Right 2nd MCP - Swollen - - - - - - -   Right 3rd MCP - Tender - - - - - - -   Right 3rd MCP - Swollen - - - - - - -   Right 4th MCP - Swollen - - - - - - -   Right 5th MCP - Tender - - - - - - -   Right 5th MCP - Swollen - - - - - - -   Right 2nd PIP - Tender - - - 0 1 1 1   Right 2nd PIP - Swollen - - - 1 1 1 1   Right 3rd PIP - Tender - - 0 0 1 1 0   Right 3rd PIP - Swollen - - 1 1 1 1 1   Right 4th PIP - Tender - - - - 1 1 1   Right 4th PIP - Swollen - - - - 1 1 1   Right 5th PIP - Tender - - 0 - 1 1 1   Right 5th PIP - Swollen - - 1 - 1 1 1   Tender Joint Count (Total) 0 0 4 0 12 4 8   Swollen Joint Count (Total) 0 0 6 3 12 6 12   Physician Assessment (0-10) 0 0 2 1 4 2 4   Patient Assessment (0-10) 0 2 5 2 2 2 9.5   CDAI Total (calculated) 0 2 17 6 30 14 33.5       DATA REVIEW    Laboratory     Recent laboratory results were reviewed, summarized, and discussed with the patient. Imaging    Musculoskeletal Ultrasound    None    Radiographs    Chest 3/15/2018: clear lungs. The cardiac and mediastinal contours and pulmonary vascularity are normal.  The bones and soft tissues are within normal limits. Bilateral Hand 7/31/2017: RIGHT: no fracture. There is diffuse osteopenia. Both corticated and non corticated erosive changes are present involving the radiocarpal joint, carpal joints, and metacarpal carpal joints.  In addition, there is involvement of the first and second metacarpal phalangeal joints, most pronounced in the first. The lunate and radius appear ankylosed. LEFT:  no fracture. There is diffuse osteopenia. Both corticated and non corticated erosive changes are present involving the radiocarpal joint, carpal joints, and metacarpal carpal joints. The lunate and radius appear ankylosed. Some erosive changes are also present in the third PIP joint. The scaphoid lunate distance is widened. Left Elbow 7/31/2017: marked flattening of the radial head with sclerosis. In erosive changes also present of the proximal ulna. A moderate joint effusion is seen. Use osteopenia is noted. Bilateral Foot 7/31/2017: RIGHT: diffuse osteopenia. Non corticated erosions are present involving the second, fourth, and fifth MTP joints. Associated soft tissue swelling is present. The there may be an ankylosis of the fourth and third metatarsals to the midfoot. Flattening of the second metatarsal head is noted. There is a small Achilles and moderate the plantar spur. No fracture is seen. LEFT: diffuse osteopenia. Erosive changes are present at the second and third metatarsal tarsal joints. A small spur is noted at the Achilles insertion. No fractures identified. Less forefoot soft tissue swelling is present. No fracture is seen    Chest 1/30/2017: lungs remain clear. No pneumothorax or pleural effusion apparent. Cardiac silhouette remains large. Endotracheal tube and nasogastric tube have been removed. Left central line stable in position with tip projecting over the superior vena cava. Right Hip 4/26/2016: no fracture or dislocation. The right hip joint spaces normal and symmetric with the left side. Enthesophytes are seen along either iliac crest and there is relatively severe bilateral facet hypertrophy at L5-S1. The sacroiliac joints appear grossly normal.     Right Femur 4/26/2016: the patient is status post prior placement of a 3 component right total knee prosthesis.  From this examination a full assessment of the prosthesis is limited. Grossly this examination is negative for a loosening of the prosthetic components, heterotopic ossification, or additional complications of the prosthesis. The joint spaces of the right hip are well maintained without significant osteoarthritis. This examination is negative for a fracture or an insufficiency fracture of the proximal femur. This examination examination is negative for focal lytic or blastic lesions of the right femur. CT Imaging    CT Abdomen and Pelvis without contrast 7/31/2017:there are linear densities at both lung bases suggesting scarring or subsegmental atelectasis. The lung bases are otherwise clear no pleural effusion is seen. The liver spleen and pancreas are unremarkable. No renal stone or mass is seen. No ureteral dilatation or stone is seen. The urinary bladder is unremarkable. A urinary catheter is noted with tip within the urinary bladder. Small calcifications within the uterus are again noted. The uterus and pelvic adnexa are otherwise unremarkable. No dilated bowel or free air or free fluid is seen. Specifically there is no evidence of retroperitoneal or intra-abdominal hemorrhage. An inferior vena cava filter is noted. MR Imaging    MRI Lumbar Spine with and without contrast 4/26/2016: study is suboptimal secondary to patient's body habitus. T12-L1: Normal for age. L1-L2: Normal for age. L2-L3: There is mild facet joint arthropathy. L3-L4: There is mild disc disease with concentric bulge. Moderate to severe facet arthropathy is present with bony hypertrophy and ligamentous thickening. There is moderate central canal stenosis and lateral recess narrowing. Bilateral foraminal narrowing is present. There is absence of fat within the right neural foramen suggesting involvement of the exiting L3 nerve root. L4-L5: There is mild disc disease with loss of disc height.  Moderate to severe facet arthropathy is present with bony hypertrophy and ligamentous thickening. There is moderate central canal stenosis primarily in a transverse direction. Severe lateral recess narrowing is present with moderate bilateral foraminal narrowing. Absence of fat within the right neural foramen suggests involvement of the exiting right L4 nerve root. L5-S1: There is mild disc degeneration with loss of disc height. Moderate to severe facet arthropathy is present with bony hypertrophy and ligamentous thickening. No significant canal stenosis. There is mild lateral recess narrowing. Bilateral neural foraminal narrowing is also present, right greater than left. Absence of fat within the right neural foramen suggests involvement of the exiting right L5 nerve root. Visualized portions of the sacrum are normal. There is no abnormal enhancement. The conus is normal in contour and signal characteristics. Paraspinal soft tissues are unremarkable. DXA     DXA 6/01/2020: (excluded None) lumbar spine L1-L4 T score 1.7 (BMD 1.402 g/cm2), left femoral neck T score: -0.6 (0.864 g/cm2), left total hip T score: 0.2 (1.027 g/cm2), right femoral neck T score: -1.3 (0.864 g/cm2), right total hip T score: -0.7 (0.921 g/cm2), and distal one third left radius T score -3.3 (BMD 0.588 g/cm2). FRAX score 6.6 % probability in 10 years for major osteoporotic fracture and 0.7 % 10 year probability of hip fracture. I personally reviewed the images of this study. DXA 8/28/2017: (excluded None) showed lumbar spine L1-L4 T score 1.2 (BMD 1.345 g/cm2), left femoral neck T score -0.9 (0.919 g/cm2), left total hip T score: 0.1 (1.020 g/cm2), right femoral neck T score: -0.4 (0.977 g/cm2), right total hip T score: -0.6 (0.938 g/cm2), and distal one third left radius T score N/A (BMD N/A g/cm2). FRAX score 5.8 % probability in 10 years for major osteoporotic fracture and 0.4 % 10 year probability of hip fracture. ASSESSMENT AND PLAN    This is an acute follow up visit for Ms. Tracy Bowden.     1) Seropositive Erosive Rheumatoid Arthritis. She is maintained on methotrexate 25 mg subcutaneously every Wednesday and Actemra 162 mg every Monday with good tolerance. She denies breakthrough. She denies inflammatory joint symptoms. She denies rash. Her CDAI was 0 (previously 2, 17, 6, 6, 14, 33.5, 42, 36, 17, 3, 5) with 0 tender and 0 swollen joints, consistent with remission. I will continue treatment. Labs ordered and slip given. 2) Long Term Use of Immunosuppressants. The patient remains on immunomodulatory medications (methotrexate, Actemra) and requires frequent toxicity monitoring by blood work. Respective labs ordered and slip given. 3) Vitamin D Deficiency. Her vitamin D level was 30.8 (previously 30.7, 33.2, 28.5, 48.1, 41.1, 24.7). She is on weekly ergocalciferol 50,000. I will check her level. 4) Morbid Obesity. Her BMI was 48. 91.     5) Fibromyalgia. This was not an active issue.    6) Leukopenia. Her WBC was 2.4  (previously 1.8, 2.2) and likely from Actemra. This is stable. I will repeat. 7) CKD Stage 2. This resolved. Her eGFR was 83. 8) Positive Lupus Anticoagulant with negative Confirm. Her lupus anticoagulant confirm was negative (less than 1.5), therefore she does not have lupus anticoagulant related anti-phospholipid syndrome. 9) Age-Related Osteoporosis. Her DXA 6/01/2020 showed distal one third left radius T score -3.3 (BMD 0.588 g/cm2). She started Prolia 60 mg every 6 months on 7/20/2020 with good tolerance. I will continue. I ordered labs. 10) Musculoskeletal Neck Pain. This was not an active issue today. I recommend physical therapy. The patient voiced understanding of the aforementioned assessment and plan. Summary of plan was provided in the After Visit Summary patient instructions.      TODAY'S ORDERS    Orders Placed This Encounter    QUANTIFERON-TB GOLD PLUS    C REACTIVE PROTEIN, QT    CBC WITH AUTOMATED DIFF    CHRONIC HEPATITIS PANEL    METABOLIC PANEL, COMPREHENSIVE    PROTEIN ELECTROPHORESIS W/ REFLX BELGICA    SED RATE (ESR)    VITAMIN D, 25 HYDROXY    PTH INTACT    TSH REFLEX TO T4     Future Appointments   Date Time Provider Constance Lois   1/18/2021  1:00 PM INFUSIONINJ_RC AOCR BS AMB   1/18/2021  3:00 PM H2 ABBY FASTRACK [facility]     Jeb West MD, [address] [facility]    Adult Rheumatology   Rheumatology Ultrasound Certified  [facility]  A Part of [facility], [address]   Phone [phone]  Fax [phone]

## 2020-10-08 NOTE — TELEPHONE ENCOUNTER
Called and spoke with caretaker in reference to results. Caretaker verbalized understanding. We started her on Actemra with 0 copay, did she start it?

## 2020-10-22 ENCOUNTER — DOCUMENTATION ONLY (OUTPATIENT)
Dept: PHARMACY | Age: 70
End: 2020-10-22

## 2020-10-22 NOTE — PROGRESS NOTES
[facility] at [address] [facility] Update     Date: 10/21/2020     Sandy Peterson 1950      Medication: Actemra 162mg/0.9mL     Co-pay = $0     Fill: 10/20/2020     Ship: 10/20/2020     Deliver: 10/21/2020     Next Fill Due: 11/10/2020     Pharmacist offered counseling.     Jose L Deutsch, [address] at [facility],  [place], [address]  phone: [phone]   fax: [phone]

## 2020-11-02 DIAGNOSIS — Z79.60 LONG-TERM USE OF IMMUNOSUPPRESSANT MEDICATION: ICD-10-CM

## 2020-11-02 DIAGNOSIS — M05.79 SEROPOSITIVE RHEUMATOID ARTHRITIS OF MULTIPLE SITES (HCC): ICD-10-CM

## 2020-11-02 RX ORDER — METHOTREXATE 25 MG/ML
INJECTION INTRA-ARTERIAL; INTRAMUSCULAR; INTRATHECAL; INTRAVENOUS
Qty: 24 ML | Refills: 2 | Status: SHIPPED | OUTPATIENT
Start: 2020-11-02 | End: 2021-07-30 | Stop reason: SDUPTHER

## 2020-11-13 ENCOUNTER — DOCUMENTATION ONLY (OUTPATIENT)
Dept: PHARMACY | Age: 70
End: 2020-11-13

## 2020-11-13 NOTE — PROGRESS NOTES
[facility] at [address] [facility] Update     Date: 11/11/2020     Rafa Macias Nicholas 1950      Medication: Actemra 162mg/0.9mL     Co-pay = $0     Fill: 11/10/2020     Ship: 11/10/2020     Deliver: 11/11/2020     Next Fill Due: 12/1/2020     Pharmacist offered counseling.     Kallie Ma, [address] at [facility],  [place], [address]  phone: [phone]   fax: [phone]

## 2020-12-01 DIAGNOSIS — M05.79 SEROPOSITIVE RHEUMATOID ARTHRITIS OF MULTIPLE SITES (HCC): ICD-10-CM

## 2020-12-01 RX ORDER — TOCILIZUMAB 180 MG/ML
INJECTION, SOLUTION SUBCUTANEOUS
Qty: 3.6 ML | Refills: 11 | Status: SHIPPED | OUTPATIENT
Start: 2020-12-01 | End: 2021-03-03 | Stop reason: SDUPTHER

## 2020-12-14 DIAGNOSIS — E55.9 VITAMIN D DEFICIENCY: ICD-10-CM

## 2020-12-14 DIAGNOSIS — M05.79 SEROPOSITIVE RHEUMATOID ARTHRITIS OF MULTIPLE SITES (HCC): ICD-10-CM

## 2020-12-14 DIAGNOSIS — Z79.60 LONG-TERM USE OF IMMUNOSUPPRESSANT MEDICATION: ICD-10-CM

## 2020-12-15 RX ORDER — ERGOCALCIFEROL 1.25 MG/1
50000 CAPSULE ORAL
Qty: 12 CAP | Refills: 4 | Status: SHIPPED | OUTPATIENT
Start: 2020-12-15 | End: 2021-07-30 | Stop reason: SDUPTHER

## 2021-01-14 ENCOUNTER — TELEPHONE (OUTPATIENT)
Dept: RHEUMATOLOGY | Age: 71
End: 2021-01-14

## 2021-01-18 ENCOUNTER — HOSPITAL ENCOUNTER (OUTPATIENT)
Dept: INFUSION THERAPY | Age: 71
Discharge: HOME | End: 2021-01-18
Payer: MEDICARE

## 2021-01-18 VITALS — HEART RATE: 77 BPM | DIASTOLIC BLOOD PRESSURE: 98 MMHG | TEMPERATURE: 96.9 F | SYSTOLIC BLOOD PRESSURE: 152 MMHG

## 2021-01-18 LAB
ALBUMIN SERPL-MCNC: 4 G/DL (ref 3.5–5)
ANION GAP SERPL CALC-SCNC: 9 MMOL/L (ref 5–15)
BUN SERPL-MCNC: 13 MG/DL (ref 6–20)
BUN/CREAT SERPL: 14 (ref 12–20)
CALCIUM SERPL-MCNC: 9.5 MG/DL (ref 8.5–10.1)
CHLORIDE SERPL-SCNC: 107 MMOL/L (ref 97–108)
CO2 SERPL-SCNC: 26 MMOL/L (ref 21–32)
CREAT SERPL-MCNC: 0.9 MG/DL (ref 0.55–1.02)
GLUCOSE SERPL-MCNC: 80 MG/DL (ref 65–100)
MAGNESIUM SERPL-MCNC: 2.1 MG/DL (ref 1.6–2.4)
PHOSPHATE SERPL-MCNC: 3.5 MG/DL (ref 2.6–4.7)
PHOSPHATE SERPL-MCNC: 3.6 MG/DL (ref 2.6–4.7)
POTASSIUM SERPL-SCNC: 4.4 MMOL/L (ref 3.5–5.1)
SODIUM SERPL-SCNC: 142 MMOL/L (ref 136–145)

## 2021-01-18 PROCEDURE — 74011250636 HC RX REV CODE- 250/636: Performed by: INTERNAL MEDICINE

## 2021-01-18 PROCEDURE — 84100 ASSAY OF PHOSPHORUS: CPT

## 2021-01-18 PROCEDURE — 96372 THER/PROPH/DIAG INJ SC/IM: CPT

## 2021-01-18 PROCEDURE — 80069 RENAL FUNCTION PANEL: CPT

## 2021-01-18 PROCEDURE — 36415 COLL VENOUS BLD VENIPUNCTURE: CPT

## 2021-01-18 PROCEDURE — 83735 ASSAY OF MAGNESIUM: CPT

## 2021-01-18 RX ADMIN — DENOSUMAB 60 MG: 60 INJECTION SUBCUTANEOUS at 16:40

## 2021-01-18 NOTE — PROGRESS NOTES
Outpatient Infusion Center Progress Note    1500 Pt admit to [facility] for labs and Prolia injection ambulatory in stable condition. Assessment completed. No new concerns voiced. Peripherally stuck for labs which were sent for processing. Prior to treatment patient was screened for COVID. Denies any signs or symptoms of COVID. Denies any physical contact with anyone exposed to, diagnosed with or with pending or positive COVID test. Denies any pending or positive COVID test herself. Visit Vitals  BP (!) 152/98 (BP 1 Location: Right arm, BP Patient Position: Sitting)   Pulse 77   Temp 96.9 °F (36.1 °C)   Breastfeeding No       Medications Administered     denosumab (PROLIA) injection 60 mg     Admin Date  01/18/2021 Action  Given Dose  60 mg Route  SubCUTAneous Administered By  Grabiel Siddiqui                1640 Pt tolerated treatment well. D/c home ambulatory in no distress. Pt aware of next appointment scheduled.     Recent Results (from the past 12 hour(s))   RENAL FUNCTION PANEL    Collection Time: 01/18/21  3:02 PM   Result Value Ref Range    Sodium 142 136 - 145 mmol/L    Potassium 4.4 3.5 - 5.1 mmol/L    Chloride 107 97 - 108 mmol/L    CO2 26 21 - 32 mmol/L    Anion gap 9 5 - 15 mmol/L    Glucose 80 65 - 100 mg/dL    BUN 13 6 - 20 MG/DL    Creatinine 0.90 0.55 - 1.02 MG/DL    BUN/Creatinine ratio 14 12 - 20      GFR est AA >60 >60 ml/min/1.73m2    GFR est non-AA >60 >60 ml/min/1.73m2    Calcium 9.5 8.5 - 10.1 MG/DL    Phosphorus 3.5 2.6 - 4.7 MG/DL    Albumin 4.0 3.5 - 5.0 g/dL   MAGNESIUM    Collection Time: 01/18/21  3:02 PM   Result Value Ref Range    Magnesium 2.1 1.6 - 2.4 mg/dL   PHOSPHORUS    Collection Time: 01/18/21  3:02 PM   Result Value Ref Range    Phosphorus 3.6 2.6 - 4.7 MG/DL       Monika Silva RN

## 2021-03-03 ENCOUNTER — TELEPHONE (OUTPATIENT)
Dept: RHEUMATOLOGY | Age: 71
End: 2021-03-03

## 2021-03-03 DIAGNOSIS — M05.79 SEROPOSITIVE RHEUMATOID ARTHRITIS OF MULTIPLE SITES (HCC): ICD-10-CM

## 2021-03-03 NOTE — TELEPHONE ENCOUNTER
New script for Actemra sent to [address] [facility] since that is where it was filled last in Dec.

## 2021-03-03 NOTE — TELEPHONE ENCOUNTER
Patient calling due to her Actemra has run out. She has 3 needles left she stated. Her phone is [phone].

## 2021-03-04 RX ORDER — TOCILIZUMAB 180 MG/ML
INJECTION, SOLUTION SUBCUTANEOUS
Qty: 3.6 ML | Refills: 11 | Status: SHIPPED | OUTPATIENT
Start: 2021-03-04 | End: 2022-03-16 | Stop reason: SDUPTHER

## 2021-03-08 ENCOUNTER — DOCUMENTATION ONLY (OUTPATIENT)
Dept: PHARMACY | Age: 71
End: 2021-03-08

## 2021-03-08 NOTE — PROGRESS NOTES
[facility] at [address] [facility] Update    Date: 03/08/21    Loyd Pole 1950     Medication: Actemra 162mg/0.9mL syringe (weekly)    Prior Authorization: through 12/31/2021    Co-pay $9.20    Fill: 3/4/2021    Ship: 3/8/2021    Deliver: 3/9/2021    Next Fill Due: 3/25/2021    Pharmacist offered counseling to the patient. Handout provided.     Kristel Arrington, [address] at [address]   [place], [facility] [address]  phone: [phone]   fax: [phone]

## 2021-04-07 ENCOUNTER — OFFICE VISIT (OUTPATIENT)
Dept: RHEUMATOLOGY | Age: 71
End: 2021-04-07
Payer: MEDICARE

## 2021-04-07 VITALS
HEIGHT: 66 IN | WEIGHT: 293 LBS | BODY MASS INDEX: 47.09 KG/M2 | HEART RATE: 84 BPM | DIASTOLIC BLOOD PRESSURE: 64 MMHG | TEMPERATURE: 97.1 F | SYSTOLIC BLOOD PRESSURE: 109 MMHG | OXYGEN SATURATION: 95 % | RESPIRATION RATE: 18 BRPM

## 2021-04-07 DIAGNOSIS — M05.79 SEROPOSITIVE RHEUMATOID ARTHRITIS OF MULTIPLE SITES (HCC): Primary | ICD-10-CM

## 2021-04-07 DIAGNOSIS — E55.9 VITAMIN D DEFICIENCY: ICD-10-CM

## 2021-04-07 DIAGNOSIS — Z79.60 LONG-TERM USE OF IMMUNOSUPPRESSANT MEDICATION: ICD-10-CM

## 2021-04-07 DIAGNOSIS — M81.0 AGE-RELATED OSTEOPOROSIS WITHOUT CURRENT PATHOLOGICAL FRACTURE: ICD-10-CM

## 2021-04-07 DIAGNOSIS — E78.5 DYSLIPIDEMIA: ICD-10-CM

## 2021-04-07 PROCEDURE — G8432 DEP SCR NOT DOC, RNG: HCPCS | Performed by: INTERNAL MEDICINE

## 2021-04-07 PROCEDURE — 3017F COLORECTAL CA SCREEN DOC REV: CPT | Performed by: INTERNAL MEDICINE

## 2021-04-07 PROCEDURE — G8752 SYS BP LESS 140: HCPCS | Performed by: INTERNAL MEDICINE

## 2021-04-07 PROCEDURE — G8417 CALC BMI ABV UP PARAM F/U: HCPCS | Performed by: INTERNAL MEDICINE

## 2021-04-07 PROCEDURE — 1101F PT FALLS ASSESS-DOCD LE1/YR: CPT | Performed by: INTERNAL MEDICINE

## 2021-04-07 PROCEDURE — G8754 DIAS BP LESS 90: HCPCS | Performed by: INTERNAL MEDICINE

## 2021-04-07 PROCEDURE — G8427 DOCREV CUR MEDS BY ELIG CLIN: HCPCS | Performed by: INTERNAL MEDICINE

## 2021-04-07 PROCEDURE — G8536 NO DOC ELDER MAL SCRN: HCPCS | Performed by: INTERNAL MEDICINE

## 2021-04-07 PROCEDURE — 1090F PRES/ABSN URINE INCON ASSESS: CPT | Performed by: INTERNAL MEDICINE

## 2021-04-07 PROCEDURE — 99215 OFFICE O/P EST HI 40 MIN: CPT | Performed by: INTERNAL MEDICINE

## 2021-04-07 NOTE — PROGRESS NOTES
Chief Complaint   Patient presents with    Arthritis     right elbow pain     Visit Vitals  /64 (BP 1 Location: Left arm, BP Patient Position: Sitting, BP Cuff Size: Large adult)   Pulse 84   Temp 97.1 °F (36.2 °C) (Oral)   Resp 18   Ht 5' 6\" (1.676 m)   Wt 307 lb (139.3 kg)   SpO2 95%   BMI 49.55 kg/m²     1. Have you been to the ER, urgent care clinic since your last visit? Hospitalized since your last visit?no    2. Have you seen or consulted any other health care providers outside of the [address] [place] [address] since your last visit? Include any pap smears or colon screening.  Cardio Dr. Jorge Valencia

## 2021-04-07 NOTE — LETTER
4/7/2021 Patient: Cassia Azar YOB: 1950 Date of Visit: 4/7/2021 Pawan Bruce MD 
[facility] [address] [facility] [address] [address] Fax: [phone] Dear Pawan Bruce MD, Thank you for referring Ms. Endy Magdaleno to [facility] for evaluation. My notes for this consultation are attached. If you have questions, please do not hesitate to call me. I look forward to following your patient along with you.  
 
 
Sincerely, 
 
Nuvia Phillips MD

## 2021-04-07 NOTE — PROGRESS NOTES
REASON FOR VISIT    This is a follow up visit for Ms. Rebecca Dixon for    ICD-10-CM   1.  Seropositive rheumatoid arthritis of multiple sites ([facility] M05.79     Inflammatory arthritis phenotype includes:  Anti-CCP positive: yes (21)  Rheumatoid factor positive: yes (94.8)  Erosive disease: yes  Extra-articular manifestations include: none    Immunosuppression Screening (10/15/2020):  Quantiferon TB: negative  PPD: negative (2016)  PPD: negative (8/16/2017)  Hepatitis B: negative  Hepatitis C: negative     Therapy History includes:  Current DMARD therapy include: methotrexate 25 mg subcutaneously every Monday, Actemra 162 mg every Monday (11/2018 to 3/2019, 9/16/2019 to present)  Prior DMARD therapy include: gold (one year), methotrexate 20 mg (PO), Humira 40 mg every 14 days (10/31/2017), Enbrel every Monday (2/2018 to 6/11/2018), Adelina Spells 11 mg XR (6/18/2018 to 9/17/2018),  Kevzara 200 mg every 14 days (3/19/2019 to 6/17/2019), Actemra 162 mg weekly Actemra 162 mg every 14 days (6/2019 to present; error; should be every 7 days)  Discontinued DMARDs because of inefficacy: gold, Enbrel, Xeljanz 11 mg XR, Kevzara  Discontinued DMARDs because of side effects: Humira (rash), Kevzara (hives)    Bone Density Historical Synopsis    Height loss since age 29 (at least two inches): 0  Fracture history includes: no  Family history of hip fracture: no  Fall Risk: no    Daily calcium intake is 0 mg  Weekly vitamin D intake is 50,000 IU    Smoking history: no  Alcohol consumption: no  Prednisone history: yes    Exercise: no    Previous work-up for osteoporosis includes the following:  DEXA Scan: 6/01/2020  Vitamin 25OH D level: 38.2 (10/15/2020)  PTH: 32 (10/15/2020)  TSH: 1.60 (10/15/2020)    Therapy History includes:  Current osteoporosis therapy includes: Prolia 60 mg every 6 months (7/20/2020 to present)  Prior osteoporosis therapy includes: none  The following osteoporosis therapy have been ineffective: none  The following osteoporosis therapy were stopped because of side effects: none    HISTORY OF PRESENT ILLNESS    Ms. Jeannette Dsouza returns for a follow up visit. On her last visit, I continued methotrexate 25 mg subcutaneous every Monday and Actemra 162 mg every Monday, which she has taken with good tolerance. She denies breakthrough. I also continued Prolia which se received on 1/18/2021. Today, she feels well. She denies pain, swelling, or stiffness in her joints. She has pain in her right elbow when she leans on it, since March. No injury. She declines COVID. She denies fever, weight loss, blurred vision, vision loss, oral ulcers, ankle swelling, dry cough, dyspnea, nausea, vomiting, dysphagia, abdominal pain, black or bloody stool, fall since last visit, easy bruising and increased thirst.    Most recent toxicity monitoring by blood work was done on 10/15/2020 and did not reveal any significant adverse effects, except WBC 2.3    Most recent inflammatory markers from 10/15/2020 revealed a ESR 11 mm/hr and CRP <0.5 mg/L. I reviewed the patient's interval medical history, surgical history, medications, and allergies. The patient has not had any interval hospital admissions, infections, or surgeries. REVIEW OF SYSTEMS    A comprehensive review of systems was performed and pertinent results are documented in the HPI, review of systems is otherwise non-contributory. PAST MEDICAL HISTORY    She has a past medical history of Anemia, Fibromyalgia, Hypertension, Osteoarthritis, Osteoporosis, Pulmonary embolism ([address] and Rheumatoid arthritis ([facility] FAMILY HISTORY    Her family history includes Arthritis-rheumatoid in her mother; Cancer in her father; Diabetes in her maternal grandmother and son; Heart Attack in her paternal grandmother; Schizophrenia in her son. SOCIAL HISTORY    She reports that she has never smoked.  She has never used smokeless tobacco. She reports that she does not drink alcohol or use drugs.    MEDICATIONS    Current Outpatient Medications   Medication Sig    rivaroxaban (Xarelto) 10 mg tablet Take  by mouth daily.  tocilizumab (Actemra) 162 mg/0.9 mL syringe INJECT 0.9ML (162MG) UNDER THE SKIN ONCE EVERY MONDAY    ergocalciferol (ERGOCALCIFEROL) 1,250 mcg (50,000 unit) capsule TAKE 1 CAP BY MOUTH EVERY SEVEN (7) DAYS.  folic acid (FOLVITE) 1 mg tablet TAKE 1 TABLET EVERY DAY    methotrexate, PF, 25 mg/mL injection INJECT 1 ML SUBCUTANEOUSLY EVERY MONDAY. VIAL IS FOR SINGLE USE ONLY, DISCARD IMMEDIATELY AFTER USE.  denosumab (Prolia) 60 mg/mL injection 60 mg by SubCUTAneous route every 6 months.  Insulin Syringe-Needle U-100 (DROPLET INSULIN SYRINGE) 1 mL 30 gauge x 5/16 syrg USE TO INJECT METHOTREXATE ONE TIME WEEKLY    acetaminophen (TYLENOL ARTHRITIS PAIN) 650 mg TbER Take 650 mg by mouth as needed.  IRON, FERROUS SULFATE, PO Take 65 mg by mouth daily.  multivitamin (ONE A DAY) tablet Take 1 Tab by mouth daily.  metoprolol tartrate (LOPRESSOR) 50 mg tablet two (2) times a day. No current facility-administered medications for this visit. ALLERGIES    Allergies   Allergen Reactions    Clindamycin Swelling and Other (comments)     fever    Humira [Adalimumab] Hives       PHYSICAL EXAMINATION    Visit Vitals  /64 (BP 1 Location: Left arm, BP Patient Position: Sitting, BP Cuff Size: Large adult)   Pulse 84   Temp 97.1 °F (36.2 °C) (Oral)   Resp 18   Ht 5' 6\" (1.676 m)   Wt 307 lb (139.3 kg)   SpO2 95%   BMI 49.55 kg/m²     Body mass index is 49.55 kg/m². General: Patient is alert, oriented x 3, not in acute distress    HEENT:   Sclerae are not injected and appear moist.  There is no alopecia. Cardiovascular:  Heart is regular rate and rhythm, no murmurs. Chest:  Lungs are clear to auscultation bilaterally.     Musculoskeletal exam:  A comprehensive musculoskeletal exam was performed for all joints of each upper and lower extremity and assessed for swelling, tenderness and range of motion.  Positive results are documented as below:    Decreased ROM of wrists    Left elbow flexion deformity  Right 3rd PIP flexion deformity    Z-Deformities:   none  [place] Deformities:  none  [place] Deformities:  none  Ulnar Deviation:   none     Joint Count 4/7/2021 10/7/2020 3/2/2020 12/2/2019 9/16/2019 6/17/2019 3/18/2019   Patient pain (0-100) 10 0 20 50 20 90 50   MHAQ 0 0 0 0 0 0 0.125   Left elbow - Tender - - - - - - -   Left elbow - Swollen - - - - - - -   Left wrist- Tender 0 0 0 1 - 1 -   Left wrist- Swollen 0 0 0 0 - 1 -   Left 1st MCP - Tender - - - - - 1 -   Left 1st MCP - Swollen - - - - - 0 -   Left 2nd MCP - Tender - - - - - 0 0   Left 2nd MCP - Swollen - - - - - 1 1   Left 3rd MCP - Tender - - - - - - 0   Left 3rd MCP - Swollen - - - - - - 1   Left 4th MCP - Tender 0 - - - - 1 -   Left 4th MCP - Swollen - - - - - 0 -   Left 5th MCP - Tender - - - - - - -   Left thumb IP - Tender - - - - - 1 -   Left thumb IP - Swollen - - - - - 1 -   Left 2nd PIP - Tender - - - - - 1 -   Left 2nd PIP - Swollen - - - - - 1 -   Left 3rd PIP - Tender - - - 1 - 1 -   Left 3rd PIP - Swollen - - - 1 - 1 -   Left 4th PIP - Tender - - - 1 - 1 -   Left 4th PIP - Swollen - - - 1 - 1 -   Left 5th PIP - Tender - - - 1 - 1 -   Left 5th PIP - Swollen - - - 1 - 1 -   Right wrist- Tender - - - 0 0 0 -   Right wrist- Swollen - - - 1 1 1 -   Right 1st MCP - Tender - - - - - - -   Right 2nd MCP - Tender - - - - - - -   Right 2nd MCP - Swollen - - - - - - -   Right 3rd MCP - Tender - - - - - - -   Right 3rd MCP - Swollen - - - - - - -   Right 4th MCP - Swollen - - - - - - -   Right 5th MCP - Tender - - - - - - -   Right 5th MCP - Swollen - - - - - - -   Right 2nd PIP - Tender - - - - 0 1 1   Right 2nd PIP - Swollen - - - - 1 1 1   Right 3rd PIP - Tender - - - 0 0 1 1   Right 3rd PIP - Swollen - - - 1 1 1 1   Right 4th PIP - Tender - - - - - 1 1   Right 4th PIP - Swollen - - - - - 1 1 Right 5th PIP - Tender - - - 0 - 1 1   Right 5th PIP - Swollen - - - 1 - 1 1   Tender Joint Count (Total) 0 0 0 4 0 12 4   Swollen Joint Count (Total) 0 0 0 6 3 12 6   Physician Assessment (0-10) 0 0 0 2 1 4 2   Patient Assessment (0-10) 1 0 2 5 2 2 2   CDAI Total (calculated) 1 0 2 17 6 30 14       DATA REVIEW    Laboratory     Recent laboratory results were reviewed, summarized, and discussed with the patient. Imaging    Musculoskeletal Ultrasound    None    Radiographs    Chest 3/15/2018: clear lungs. The cardiac and mediastinal contours and pulmonary vascularity are normal.  The bones and soft tissues are within normal limits. Bilateral Hand 7/31/2017: RIGHT: no fracture. There is diffuse osteopenia. Both corticated and non corticated erosive changes are present involving the radiocarpal joint, carpal joints, and metacarpal carpal joints. In addition, there is involvement of the first and second metacarpal phalangeal joints, most pronounced in the first. The lunate and radius appear ankylosed. LEFT:  no fracture. There is diffuse osteopenia. Both corticated and non corticated erosive changes are present involving the radiocarpal joint, carpal joints, and metacarpal carpal joints. The lunate and radius appear ankylosed. Some erosive changes are also present in the third PIP joint. The scaphoid lunate distance is widened. Left Elbow 7/31/2017: marked flattening of the radial head with sclerosis. In erosive changes also present of the proximal ulna. A moderate joint effusion is seen. Use osteopenia is noted. Bilateral Foot 7/31/2017: RIGHT: diffuse osteopenia. Non corticated erosions are present involving the second, fourth, and fifth MTP joints. Associated soft tissue swelling is present. The there may be an ankylosis of the fourth and third metatarsals to the midfoot. Flattening of the second metatarsal head is noted. There is a small Achilles and moderate the plantar spur. No fracture is seen. LEFT: diffuse osteopenia. Erosive changes are present at the second and third metatarsal tarsal joints. A small spur is noted at the Achilles insertion. No fractures identified. Less forefoot soft tissue swelling is present. No fracture is seen    Chest 1/30/2017: lungs remain clear. No pneumothorax or pleural effusion apparent. Cardiac silhouette remains large. Endotracheal tube and nasogastric tube have been removed. Left central line stable in position with tip projecting over the superior vena cava. Right Hip 4/26/2016: no fracture or dislocation. The right hip joint spaces normal and symmetric with the left side. Enthesophytes are seen along either iliac crest and there is relatively severe bilateral facet hypertrophy at L5-S1. The sacroiliac joints appear grossly normal.     Right Femur 4/26/2016: the patient is status post prior placement of a 3 component right total knee prosthesis. From this examination a full assessment of the prosthesis is limited. Grossly this examination is negative for a loosening of the prosthetic components, heterotopic ossification, or additional complications of the prosthesis. The joint spaces of the right hip are well maintained without significant osteoarthritis. This examination is negative for a fracture or an insufficiency fracture of the proximal femur. This examination examination is negative for focal lytic or blastic lesions of the right femur. CT Imaging    CT Abdomen and Pelvis without contrast 7/31/2017:there are linear densities at both lung bases suggesting scarring or subsegmental atelectasis. The lung bases are otherwise clear no pleural effusion is seen. The liver spleen and pancreas are unremarkable. No renal stone or mass is seen. No ureteral dilatation or stone is seen. The urinary bladder is unremarkable. A urinary catheter is noted with tip within the urinary bladder. Small calcifications within the uterus are again noted.  The uterus and pelvic adnexa are otherwise unremarkable. No dilated bowel or free air or free fluid is seen. Specifically there is no evidence of retroperitoneal or intra-abdominal hemorrhage. An inferior vena cava filter is noted. MR Imaging    MRI Lumbar Spine with and without contrast 4/26/2016: study is suboptimal secondary to patient's body habitus. T12-L1: Normal for age. L1-L2: Normal for age. L2-L3: There is mild facet joint arthropathy. L3-L4: There is mild disc disease with concentric bulge. Moderate to severe facet arthropathy is present with bony hypertrophy and ligamentous thickening. There is moderate central canal stenosis and lateral recess narrowing. Bilateral foraminal narrowing is present. There is absence of fat within the right neural foramen suggesting involvement of the exiting L3 nerve root. L4-L5: There is mild disc disease with loss of disc height. Moderate to severe facet arthropathy is present with bony hypertrophy and ligamentous thickening. There is moderate central canal stenosis primarily in a transverse direction. Severe lateral recess narrowing is present with moderate bilateral foraminal narrowing. Absence of fat within the right neural foramen suggests involvement of the exiting right L4 nerve root. L5-S1: There is mild disc degeneration with loss of disc height. Moderate to severe facet arthropathy is present with bony hypertrophy and ligamentous thickening. No significant canal stenosis. There is mild lateral recess narrowing. Bilateral neural foraminal narrowing is also present, right greater than left. Absence of fat within the right neural foramen suggests involvement of the exiting right L5 nerve root. Visualized portions of the sacrum are normal. There is no abnormal enhancement. The conus is normal in contour and signal characteristics. Paraspinal soft tissues are unremarkable.      DXA     DXA 6/01/2020: (excluded None) lumbar spine L1-L4 T score 1.7 (BMD 1.402 g/cm2), left femoral neck T score: -0.6 (0.864 g/cm2), left total hip T score: 0.2 (1.027 g/cm2), right femoral neck T score: -1.3 (0.864 g/cm2), right total hip T score: -0.7 (0.921 g/cm2), and distal one third left radius T score -3.3 (BMD 0.588 g/cm2). FRAX score 6.6 % probability in 10 years for major osteoporotic fracture and 0.7 % 10 year probability of hip fracture. DXA 8/28/2017: (excluded None) showed lumbar spine L1-L4 T score 1.2 (BMD 1.345 g/cm2), left femoral neck T score -0.9 (0.919 g/cm2), left total hip T score: 0.1 (1.020 g/cm2), right femoral neck T score: -0.4 (0.977 g/cm2), right total hip T score: -0.6 (0.938 g/cm2), and distal one third left radius T score N/A (BMD N/A g/cm2). FRAX score 5.8 % probability in 10 years for major osteoporotic fracture and 0.4 % 10 year probability of hip fracture. ASSESSMENT AND PLAN    This is a follow up visit for Ms. Devin Laboy. 1) Seropositive Erosive Rheumatoid Arthritis. She is maintained on methotrexate 25 mg subcutaneously every Wednesday and Actemra 162 mg every Monday with good tolerance. She denies breakthrough. She denies inflammatory joint symptoms. Her CDAI was 1 (previously 0, 2, 17, 6, 6, 14, 33.5, 42, 36, 17, 3, 5) with 0 tender and 0 swollen joints, consistent with remission. I will continue treatment. She declines COVID vaccine. 2) Long Term Use of Immunosuppressants. The patient remains on high risk immunomodulatory medications (methotrexate, Actemra) and requires frequent toxicity monitoring by blood work to evaluate for toxicities. Respective labs were ordered (see below orders for details). 3) Vitamin D Deficiency. Her vitamin D level was 38.2 (previously 30.8, 30.7, 33.2, 28.5, 48.1, 41.1, 24.7). She is on weekly ergocalciferol 50,000. I will check her level. 4) Morbid Obesity. Her BMI was 49.55.     5) Fibromyalgia. This was not an active issue.    6) Leukopenia. This is due to Actemra. This is stable. 7) CKD Stage 2. This resolved. Her eGFR was 83. 8) Positive Lupus Anticoagulant with negative Confirm. Her lupus anticoagulant confirm was negative (less than 1.5), therefore she does not have lupus anticoagulant related anti-phospholipid syndrome. 9) Age-Related Osteoporosis. Her DXA 6/01/2020 showed distal one third left radius T score -3.3 (BMD 0.588 g/cm2). She started Prolia 60 mg every 6 months on 7/20/2020 with good tolerance. I will continue. 10) Musculoskeletal Neck Pain. This was not an active issue today. I had recommend physical therapy. The patient voiced understanding of the aforementioned assessment and plan. A total of 41 minutes was spent on this visit, reviewing interval notes, interval testing results, ordering tests, refilling medications, documenting the findings in the note, patient education, counseling, and coordination of care as described above. All questions asked and answered.     TODAY'S ORDERS    Orders Placed This Encounter    CBC WITH AUTOMATED DIFF    METABOLIC PANEL, COMPREHENSIVE    C REACTIVE PROTEIN, QT    SED RATE (ESR)    METHOTREXATE POLYGLUTAMATES    VITAMIN D, 25 HYDROXY    LIPID PANEL     Future Appointments   Date Time Provider Constance Abreu   7/19/2021  4:30 PM [address] [place] [address]   10/7/2021  1:00 PM Thelma Acevedo MD AO[facility] BS AMB     Joann Wolf MD, [address]    Adult Rheumatology   Rheumatology Ultrasound Certified  [facility]  A [facility] of [facility], [address] [place] [address]   Phone [phone]  Fax [phone]

## 2021-04-08 ENCOUNTER — TRANSCRIBE ORDER (OUTPATIENT)
Dept: NEUROLOGY | Age: 71
End: 2021-04-08

## 2021-04-29 ENCOUNTER — TELEPHONE (OUTPATIENT)
Dept: RHEUMATOLOGY | Age: 71
End: 2021-04-29

## 2021-04-29 DIAGNOSIS — M05.79 SEROPOSITIVE RHEUMATOID ARTHRITIS OF MULTIPLE SITES (HCC): Primary | ICD-10-CM

## 2021-04-29 NOTE — TELEPHONE ENCOUNTER
Spoke with Deidre Yañez from [employer] and she stated that the methotrexate Polyglutamate was not resulted because quantity obtained was insufficient. She will call the pt to have it redrawn, and we will place a new order.

## 2021-05-13 LAB
METHOTREXATE PG1: 46 NMOL/L RBC
METHOTREXATE PG2: 31 NMOL/L RBC
METHOTREXATE PG3: 107 NMOL/L RBC
METHOTREXATE PG4: 65 NMOL/L RBC
METHOTREXATE PG5: 20 NMOL/L RBC
METHOTREXATE-PG TOTAL: 269 NMOL/L RBC
MTXPGSRA INTERPRETATION: NORMAL

## 2021-06-15 DIAGNOSIS — M81.0 AGE-RELATED OSTEOPOROSIS WITHOUT CURRENT PATHOLOGICAL FRACTURE: Primary | ICD-10-CM

## 2021-07-19 ENCOUNTER — HOSPITAL ENCOUNTER (OUTPATIENT)
Dept: INFUSION THERAPY | Age: 71
Discharge: HOME | End: 2021-07-19

## 2021-07-19 ENCOUNTER — OFFICE VISIT (OUTPATIENT)
Dept: RHEUMATOLOGY | Age: 71
End: 2021-07-19
Payer: MEDICARE

## 2021-07-19 VITALS
TEMPERATURE: 97 F | SYSTOLIC BLOOD PRESSURE: 166 MMHG | OXYGEN SATURATION: 98 % | HEART RATE: 80 BPM | DIASTOLIC BLOOD PRESSURE: 88 MMHG | RESPIRATION RATE: 17 BRPM

## 2021-07-19 DIAGNOSIS — M81.0 AGE-RELATED OSTEOPOROSIS WITHOUT CURRENT PATHOLOGICAL FRACTURE: Primary | ICD-10-CM

## 2021-07-19 PROCEDURE — 96372 THER/PROPH/DIAG INJ SC/IM: CPT

## 2021-07-19 RX ORDER — ONDANSETRON 2 MG/ML
8 INJECTION INTRAMUSCULAR; INTRAVENOUS AS NEEDED
Status: CANCELLED | OUTPATIENT
Start: 2022-01-17

## 2021-07-19 RX ORDER — EPINEPHRINE 1 MG/ML
0.3 INJECTION, SOLUTION, CONCENTRATE INTRAVENOUS AS NEEDED
Status: CANCELLED | OUTPATIENT
Start: 2022-01-17

## 2021-07-19 RX ORDER — DIPHENHYDRAMINE HYDROCHLORIDE 50 MG/ML
50 INJECTION, SOLUTION INTRAMUSCULAR; INTRAVENOUS AS NEEDED
Status: CANCELLED
Start: 2022-01-17

## 2021-07-19 RX ORDER — EPINEPHRINE 1 MG/ML
0.3 INJECTION, SOLUTION, CONCENTRATE INTRAVENOUS AS NEEDED
Status: CANCELLED | OUTPATIENT
Start: 2021-07-19

## 2021-07-19 RX ORDER — ACETAMINOPHEN 325 MG/1
650 TABLET ORAL AS NEEDED
Status: CANCELLED
Start: 2021-07-19

## 2021-07-19 RX ORDER — ALBUTEROL SULFATE 0.83 MG/ML
2.5 SOLUTION RESPIRATORY (INHALATION) AS NEEDED
Status: CANCELLED
Start: 2022-01-17

## 2021-07-19 RX ORDER — DIPHENHYDRAMINE HYDROCHLORIDE 50 MG/ML
25 INJECTION, SOLUTION INTRAMUSCULAR; INTRAVENOUS AS NEEDED
Status: CANCELLED
Start: 2021-07-19

## 2021-07-19 RX ORDER — HYDROCORTISONE SODIUM SUCCINATE 100 MG/2ML
100 INJECTION, POWDER, FOR SOLUTION INTRAMUSCULAR; INTRAVENOUS AS NEEDED
Status: CANCELLED | OUTPATIENT
Start: 2022-01-17

## 2021-07-19 RX ORDER — DIPHENHYDRAMINE HYDROCHLORIDE 50 MG/ML
25 INJECTION, SOLUTION INTRAMUSCULAR; INTRAVENOUS AS NEEDED
Status: CANCELLED
Start: 2022-01-17

## 2021-07-19 RX ORDER — ONDANSETRON 2 MG/ML
8 INJECTION INTRAMUSCULAR; INTRAVENOUS AS NEEDED
Status: CANCELLED | OUTPATIENT
Start: 2021-07-19

## 2021-07-19 RX ORDER — ACETAMINOPHEN 325 MG/1
650 TABLET ORAL AS NEEDED
Status: CANCELLED
Start: 2022-01-17

## 2021-07-19 RX ORDER — HYDROCORTISONE SODIUM SUCCINATE 100 MG/2ML
100 INJECTION, POWDER, FOR SOLUTION INTRAMUSCULAR; INTRAVENOUS AS NEEDED
Status: CANCELLED | OUTPATIENT
Start: 2021-07-19

## 2021-07-19 RX ORDER — ALBUTEROL SULFATE 0.83 MG/ML
2.5 SOLUTION RESPIRATORY (INHALATION) AS NEEDED
Status: CANCELLED
Start: 2021-07-19

## 2021-07-19 RX ORDER — DIPHENHYDRAMINE HYDROCHLORIDE 50 MG/ML
50 INJECTION, SOLUTION INTRAMUSCULAR; INTRAVENOUS AS NEEDED
Status: CANCELLED
Start: 2021-07-19

## 2021-07-19 NOTE — PROGRESS NOTES
[facility] Rheumatology  OBIC Note    Date: 2021    Rheumatology OBIC COVID-19 SCREENING  The patient was asked the following questions and answered as documented below:    1. Do you have any symptoms of COVID-19? SOB, coughing, fever, or generally not feeling well? NO  2. Have you been exposed to COVID-19 recently? NO  3. Have you had any recent contact with family/friend that has a pending COVID test? NO    Name: Shar Malloy  MRN: [id number]       : 1950  Diagnosis: Age-related osteoporosis  Treatment: Prolia 60mg every 6 months    Patient [facility] to [facility] at 1130. Ms. Toyin Brush allergies reviewed and she agreed to receiving today's treatment. A physical assessment was performed initially and post-treatment. Pt. denies new complaints today. Her last Prolia treatment was 2021. Ms. Angie Vergara vitals were monitored before, during and after medication administration. Visit Vitals  BP (!) 166/88 (BP 1 Location: Left arm, BP Patient Position: Sitting)   Pulse 80   Temp 97 °F (36.1 °C)   Resp 17   SpO2 98%        Relevant labs results:   2021 10:39   Sodium 141   Potassium 4.0   Chloride 109 (H)   CO2 26   Anion gap 6   Glucose 92   BUN 8   Creatinine 0.78   BUN/Creatinine ratio 10 (L)   Calcium 9.6   Phosphorus 3.5   GFR est non-AA >60   GFR est AA >60   Albumin 3.9     Medications given per providers orders:     Administrations This Visit     denosumab (PROLIA) injection 60 mg     Admin Date  2021 Action  Given Dose  60 mg Route  SubCUTAneous Administered By  Princess Anderson RN            [address] [id number]  Lot # [id number]  Exp   2023     Ms. Peterson tolerated the treatment without complaints and there was no change to initial physical assessment. No medication reactions seen while in presence of this RN. Pt declined AVS.  All of patients questions were answered.   Ms. Toyin Brush was discharged ambulatory with rollator from Rheumatology OBIC in stable condition at 12:00.      Future Appointments   Date Time Provider Constance Abreu   9/2/2021 11:20 AM Jacque Thorpe MD [facility] BS AMB   1/17/2022 11:00 AM [facility] BS AMB     Jignesh Jenkins RN  July 19, 2021  12:00pm

## 2021-07-30 DIAGNOSIS — M05.79 SEROPOSITIVE RHEUMATOID ARTHRITIS OF MULTIPLE SITES (HCC): ICD-10-CM

## 2021-07-30 DIAGNOSIS — Z79.60 LONG-TERM USE OF IMMUNOSUPPRESSANT MEDICATION: ICD-10-CM

## 2021-07-30 DIAGNOSIS — E55.9 VITAMIN D DEFICIENCY: ICD-10-CM

## 2021-07-30 RX ORDER — SYRINGE-NEEDLE,INSULIN,0.5 ML 30 GX5/16"
SYRINGE, EMPTY DISPOSABLE MISCELLANEOUS
Qty: 100 SYRINGE | Refills: 0 | Status: SHIPPED | OUTPATIENT
Start: 2021-07-30 | End: 2022-07-20 | Stop reason: SDUPTHER

## 2021-07-30 RX ORDER — FOLIC ACID 1 MG/1
1 TABLET ORAL DAILY
Qty: 90 TABLET | Refills: 5 | Status: SHIPPED | OUTPATIENT
Start: 2021-07-30 | End: 2022-07-20 | Stop reason: SDUPTHER

## 2021-07-30 RX ORDER — ERGOCALCIFEROL 1.25 MG/1
50000 CAPSULE ORAL
Qty: 12 CAPSULE | Refills: 4 | Status: SHIPPED | OUTPATIENT
Start: 2021-07-30 | End: 2022-08-09 | Stop reason: SDUPTHER

## 2021-07-30 RX ORDER — METHOTREXATE 25 MG/ML
INJECTION INTRA-ARTERIAL; INTRAMUSCULAR; INTRATHECAL; INTRAVENOUS
Qty: 24 ML | Refills: 2 | Status: SHIPPED | OUTPATIENT
Start: 2021-07-30 | End: 2022-04-29 | Stop reason: SDUPTHER

## 2021-09-02 ENCOUNTER — OFFICE VISIT (OUTPATIENT)
Dept: RHEUMATOLOGY | Age: 71
End: 2021-09-02
Payer: MEDICARE

## 2021-09-02 VITALS
WEIGHT: 293 LBS | TEMPERATURE: 98.5 F | SYSTOLIC BLOOD PRESSURE: 125 MMHG | BODY MASS INDEX: 49.39 KG/M2 | RESPIRATION RATE: 20 BRPM | DIASTOLIC BLOOD PRESSURE: 76 MMHG | HEART RATE: 81 BPM

## 2021-09-02 DIAGNOSIS — E78.5 DYSLIPIDEMIA: ICD-10-CM

## 2021-09-02 DIAGNOSIS — E55.9 VITAMIN D DEFICIENCY: ICD-10-CM

## 2021-09-02 DIAGNOSIS — M05.79 SEROPOSITIVE RHEUMATOID ARTHRITIS OF MULTIPLE SITES (HCC): Primary | ICD-10-CM

## 2021-09-02 DIAGNOSIS — R93.89 ABNORMAL FINDINGS ON DIAGNOSTIC IMAGING OF OTHER SPECIFIED BODY STRUCTURES: ICD-10-CM

## 2021-09-02 DIAGNOSIS — M81.0 AGE-RELATED OSTEOPOROSIS WITHOUT CURRENT PATHOLOGICAL FRACTURE: ICD-10-CM

## 2021-09-02 DIAGNOSIS — Z79.60 LONG-TERM USE OF IMMUNOSUPPRESSANT MEDICATION: ICD-10-CM

## 2021-09-02 DIAGNOSIS — Z11.59 ENCOUNTER FOR SCREENING FOR OTHER VIRAL DISEASES: ICD-10-CM

## 2021-09-02 DIAGNOSIS — R79.9 ABNORMAL FINDING OF BLOOD CHEMISTRY, UNSPECIFIED: ICD-10-CM

## 2021-09-02 PROCEDURE — 3017F COLORECTAL CA SCREEN DOC REV: CPT | Performed by: INTERNAL MEDICINE

## 2021-09-02 PROCEDURE — G8536 NO DOC ELDER MAL SCRN: HCPCS | Performed by: INTERNAL MEDICINE

## 2021-09-02 PROCEDURE — 99215 OFFICE O/P EST HI 40 MIN: CPT | Performed by: INTERNAL MEDICINE

## 2021-09-02 PROCEDURE — G8752 SYS BP LESS 140: HCPCS | Performed by: INTERNAL MEDICINE

## 2021-09-02 PROCEDURE — G8427 DOCREV CUR MEDS BY ELIG CLIN: HCPCS | Performed by: INTERNAL MEDICINE

## 2021-09-02 PROCEDURE — G8417 CALC BMI ABV UP PARAM F/U: HCPCS | Performed by: INTERNAL MEDICINE

## 2021-09-02 PROCEDURE — G8754 DIAS BP LESS 90: HCPCS | Performed by: INTERNAL MEDICINE

## 2021-09-02 PROCEDURE — 1090F PRES/ABSN URINE INCON ASSESS: CPT | Performed by: INTERNAL MEDICINE

## 2021-09-02 PROCEDURE — G8432 DEP SCR NOT DOC, RNG: HCPCS | Performed by: INTERNAL MEDICINE

## 2021-09-02 PROCEDURE — 1101F PT FALLS ASSESS-DOCD LE1/YR: CPT | Performed by: INTERNAL MEDICINE

## 2021-09-02 NOTE — PROGRESS NOTES
REASON FOR VISIT    This is a follow up visit for Ms. Mendel Kettering for    ICD-10-CM   1.  Seropositive rheumatoid arthritis of multiple sites ([facility] [address] M05.79     Inflammatory arthritis phenotype includes:  Anti-CCP positive: yes (21)  Rheumatoid factor positive: yes (94.8)  Erosive disease: yes  Extra-articular manifestations include: none    Immunosuppression Screening (10/15/2020):  Quantiferon TB: negative  PPD: negative (2016)  PPD: negative (8/16/2017)  Hepatitis B: negative  Hepatitis C: negative     Therapy History includes:  Current DMARD therapy include: methotrexate 25 mg subcutaneously every Monday, Actemra 162 mg every Monday (11/2018 to 3/2019, 9/16/2019 to present)  Prior DMARD therapy include: gold (one year), methotrexate 20 mg (PO), Humira 40 mg every 14 days (10/31/2017), Enbrel every Monday (2/2018 to 6/11/2018), Ana Simper 11 mg XR (6/18/2018 to 9/17/2018),  Kevzara 200 mg every 14 days (3/19/2019 to 6/17/2019), Actemra 162 mg weekly Actemra 162 mg every 14 days (6/2019 to present; error; should be every 7 days)  Discontinued DMARDs because of inefficacy: gold, Enbrel, Xeljanz 11 mg XR, Kevzara  Discontinued DMARDs because of side effects: Humira (rash), Kevzara (hives)    Bone Density Historical Synopsis    Height loss since age 29 (at least two inches): 0  Fracture history includes: no  Family history of hip fracture: no  Fall Risk: no    Daily calcium intake is 0 mg  Weekly vitamin D intake is 50,000 IU    Smoking history: no  Alcohol consumption: no  Prednisone history: yes    Exercise: no    Previous work-up for osteoporosis includes the following:  DEXA Scan: 6/01/2020  Vitamin 25OH D level: 38.2 (10/15/2020)  PTH: 32 (10/15/2020)  TSH: 1.60 (10/15/2020)    Therapy History includes:  Current osteoporosis therapy includes: Prolia 60 mg every 6 months (7/20/2020 to present)  Prior osteoporosis therapy includes: none  The following osteoporosis therapy have been ineffective: none  The following osteoporosis therapy were stopped because of side effects: none    HISTORY OF PRESENT ILLNESS    Ms. Nory Treviño returns for a follow up visit. On her last visit, I continued methotrexate 25 mg subcutaneous every Monday and Actemra 162 mg every Monday, which she has taken with good tolerance. She denies breakthrough. I also continued Prolia which she received on 7/19/2021. Today, she feels well. She denies pain, swelling, or stiffness in her joints. She declines COVID. She denies fever, weight loss, blurred vision, vision loss, oral ulcers, ankle swelling, dry cough, dyspnea, nausea, vomiting, dysphagia, abdominal pain, black or bloody stool, fall since last visit, easy bruising and increased thirst.    Most recent toxicity monitoring by blood work was done on 4/12/2021 and did not reveal any significant adverse effects. I reviewed the patient's interval medical history, surgical history, medications, and allergies. The patient has not had any interval hospital admissions, infections, or surgeries. REVIEW OF SYSTEMS    A comprehensive review of systems was performed and pertinent results are documented in the HPI, review of systems is otherwise non-contributory. PAST MEDICAL HISTORY    She has a past medical history of Anemia, Fibromyalgia, Hypertension, Osteoarthritis, Osteoporosis, Pulmonary embolism ([address] and Rheumatoid arthritis ([facility] [address] FAMILY HISTORY    Her family history includes Arthritis-rheumatoid in her mother; Cancer in her father; Diabetes in her maternal grandmother and son; Heart Attack in her paternal grandmother; Schizophrenia in her son. SOCIAL HISTORY    She reports that she has never smoked. She has never used smokeless tobacco. She reports that she does not drink alcohol and does not use drugs. MEDICATIONS    Current Outpatient Medications   Medication Sig    folic acid (FOLVITE) 1 mg tablet Take 1 Tablet by mouth daily.     methotrexate, PF, 25 mg/mL injection INJECT 1 ML SUBCUTANEOUSLY EVERY MONDAY. VIAL IS FOR SINGLE USE ONLY, DISCARD IMMEDIATELY AFTER USE.  ergocalciferol (ERGOCALCIFEROL) 1,250 mcg (50,000 unit) capsule Take 1 Capsule by mouth every seven (7) days.  Insulin Syringe-Needle U-100 (Droplet Insulin Syringe) 1 mL 30 gauge x 5/16 syrg USE TO INJECT METHOTREXATE ONE TIME WEEKLY    rivaroxaban (Xarelto) 10 mg tablet Take  by mouth daily.  tocilizumab (Actemra) 162 mg/0.9 mL syringe INJECT 0.9ML (162MG) UNDER THE SKIN ONCE EVERY MONDAY    denosumab (Prolia) 60 mg/mL injection 60 mg by SubCUTAneous route every 6 months.  acetaminophen (TYLENOL ARTHRITIS PAIN) 650 mg TbER Take 650 mg by mouth as needed.  IRON, FERROUS SULFATE, PO Take 65 mg by mouth daily.  multivitamin (ONE A DAY) tablet Take 1 Tab by mouth daily.  metoprolol tartrate (LOPRESSOR) 50 mg tablet two (2) times a day. No current facility-administered medications for this visit. ALLERGIES    Allergies   Allergen Reactions    Clindamycin Swelling and Other (comments)     fever    Humira [Adalimumab] Hives       PHYSICAL EXAMINATION    Visit Vitals  /76   Pulse 81   Temp 98.5 °F (36.9 °C)   Resp 20   Wt 306 lb (138.8 kg)   BMI 49.39 kg/m²     Body mass index is 49.39 kg/m². General: Patient is alert, oriented x 3, not in acute distress    HEENT:   Sclerae are not injected and appear moist.  There is no alopecia. Cardiovascular:  Heart is regular rate and rhythm, no murmurs. Chest:  Lungs are clear to auscultation bilaterally. Musculoskeletal exam:  A comprehensive musculoskeletal exam was performed for all joints of each upper and lower extremity and assessed for swelling, tenderness and range of motion.  Positive results are documented as below:    Decreased ROM of wrists    Left elbow flexion deformity  Right 3rd PIP flexion deformity    Z-Deformities:   none  [place]ck Deformities:  none  [place]'s Deformities:  none  Ulnar Deviation:   none Joint Count 9/2/2021 4/7/2021 10/7/2020 3/2/2020 12/2/2019 9/16/2019 6/17/2019   Patient pain (0-100) 0 10 0 20 50 20 90   MHAQ 0 0 0 0 0 0 0   Left elbow - Tender - - - - - - -   Left elbow - Swollen - - - - - - -   Left wrist- Tender 0 0 0 0 1 - 1   Left wrist- Swollen 0 0 0 0 0 - 1   Left 1st MCP - Tender - - - - - - 1   Left 1st MCP - Swollen - - - - - - 0   Left 2nd MCP - Tender - - - - - - 0   Left 2nd MCP - Swollen - - - - - - 1   Left 3rd MCP - Tender - - - - - - -   Left 3rd MCP - Swollen - - - - - - -   Left 4th MCP - Tender - 0 - - - - 1   Left 4th MCP - Swollen - - - - - - 0   Left 5th MCP - Tender - - - - - - -   Left thumb IP - Tender - - - - - - 1   Left thumb IP - Swollen - - - - - - 1   Left 2nd PIP - Tender - - - - - - 1   Left 2nd PIP - Swollen - - - - - - 1   Left 3rd PIP - Tender - - - - 1 - 1   Left 3rd PIP - Swollen - - - - 1 - 1   Left 4th PIP - Tender - - - - 1 - 1   Left 4th PIP - Swollen - - - - 1 - 1   Left 5th PIP - Tender - - - - 1 - 1   Left 5th PIP - Swollen - - - - 1 - 1   Right wrist- Tender - - - - 0 0 0   Right wrist- Swollen - - - - 1 1 1   Right 1st MCP - Tender - - - - - - -   Right 2nd MCP - Tender - - - - - - -   Right 2nd MCP - Swollen - - - - - - -   Right 3rd MCP - Tender - - - - - - -   Right 3rd MCP - Swollen - - - - - - -   Right 4th MCP - Swollen - - - - - - -   Right 5th MCP - Tender - - - - - - -   Right 5th MCP - Swollen - - - - - - -   Right 2nd PIP - Tender - - - - - 0 1   Right 2nd PIP - Swollen - - - - - 1 1   Right 3rd PIP - Tender - - - - 0 0 1   Right 3rd PIP - Swollen - - - - 1 1 1   Right 4th PIP - Tender - - - - - - 1   Right 4th PIP - Swollen - - - - - - 1   Right 5th PIP - Tender - - - - 0 - 1   Right 5th PIP - Swollen - - - - 1 - 1   Tender Joint Count (Total) 0 0 0 0 4 0 12   Swollen Joint Count (Total) 0 0 0 0 6 3 12   Physician Assessment (0-10) 0 0 0 0 2 1 4   Patient Assessment (0-10) 0 1 0 2 5 2 2   CDAI Total (calculated) 0 1 0 2 17 6 30 DATA REVIEW    Laboratory     Recent laboratory results were reviewed, summarized, and discussed with the patient. Imaging    Musculoskeletal Ultrasound    None    Radiographs    Chest 3/15/2018: clear lungs. The cardiac and mediastinal contours and pulmonary vascularity are normal.  The bones and soft tissues are within normal limits. Bilateral Hand 7/31/2017: RIGHT: no fracture. There is diffuse osteopenia. Both corticated and non corticated erosive changes are present involving the radiocarpal joint, carpal joints, and metacarpal carpal joints. In addition, there is involvement of the first and second metacarpal phalangeal joints, most pronounced in the first. The lunate and radius appear ankylosed. LEFT:  no fracture. There is diffuse osteopenia. Both corticated and non corticated erosive changes are present involving the radiocarpal joint, carpal joints, and metacarpal carpal joints. The lunate and radius appear ankylosed. Some erosive changes are also present in the third PIP joint. The scaphoid lunate distance is widened. Left Elbow 7/31/2017: marked flattening of the radial head with sclerosis. In erosive changes also present of the proximal ulna. A moderate joint effusion is seen. Use osteopenia is noted. Bilateral Foot 7/31/2017: RIGHT: diffuse osteopenia. Non corticated erosions are present involving the second, fourth, and fifth MTP joints. Associated soft tissue swelling is present. The there may be an ankylosis of the fourth and third metatarsals to the midfoot. Flattening of the second metatarsal head is noted. There is a small Achilles and moderate the plantar spur. No fracture is seen. LEFT: diffuse osteopenia. Erosive changes are present at the second and third metatarsal tarsal joints. A small spur is noted at the Achilles insertion. No fractures identified. Less forefoot soft tissue swelling is present. No fracture is seen    Chest 1/30/2017: lungs remain clear.  No pneumothorax or pleural effusion apparent. Cardiac silhouette remains large. Endotracheal tube and nasogastric tube have been removed. Left central line stable in position with tip projecting over the superior vena cava. Right Hip 4/26/2016: no fracture or dislocation. The right hip joint spaces normal and symmetric with the left side. Enthesophytes are seen along either iliac crest and there is relatively severe bilateral facet hypertrophy at L5-S1. The sacroiliac joints appear grossly normal.     Right Femur 4/26/2016: the patient is status post prior placement of a 3 component right total knee prosthesis. From this examination a full assessment of the prosthesis is limited. Grossly this examination is negative for a loosening of the prosthetic components, heterotopic ossification, or additional complications of the prosthesis. The joint spaces of the right hip are well maintained without significant osteoarthritis. This examination is negative for a fracture or an insufficiency fracture of the proximal femur. This examination examination is negative for focal lytic or blastic lesions of the right femur. CT Imaging    CT Abdomen and Pelvis without contrast 7/31/2017:there are linear densities at both lung bases suggesting scarring or subsegmental atelectasis. The lung bases are otherwise clear no pleural effusion is seen. The liver spleen and pancreas are unremarkable. No renal stone or mass is seen. No ureteral dilatation or stone is seen. The urinary bladder is unremarkable. A urinary catheter is noted with tip within the urinary bladder. Small calcifications within the uterus are again noted. The uterus and pelvic adnexa are otherwise unremarkable. No dilated bowel or free air or free fluid is seen. Specifically there is no evidence of retroperitoneal or intra-abdominal hemorrhage. An inferior vena cava filter is noted.     MR Imaging    MRI Lumbar Spine with and without contrast 4/26/2016: study is suboptimal secondary to patient's body habitus. T12-L1: Normal for age. L1-L2: Normal for age. L2-L3: There is mild facet joint arthropathy. L3-L4: There is mild disc disease with concentric bulge. Moderate to severe facet arthropathy is present with bony hypertrophy and ligamentous thickening. There is moderate central canal stenosis and lateral recess narrowing. Bilateral foraminal narrowing is present. There is absence of fat within the right neural foramen suggesting involvement of the exiting L3 nerve root. L4-L5: There is mild disc disease with loss of disc height. Moderate to severe facet arthropathy is present with bony hypertrophy and ligamentous thickening. There is moderate central canal stenosis primarily in a transverse direction. Severe lateral recess narrowing is present with moderate bilateral foraminal narrowing. Absence of fat within the right neural foramen suggests involvement of the exiting right L4 nerve root. L5-S1: There is mild disc degeneration with loss of disc height. Moderate to severe facet arthropathy is present with bony hypertrophy and ligamentous thickening. No significant canal stenosis. There is mild lateral recess narrowing. Bilateral neural foraminal narrowing is also present, right greater than left. Absence of fat within the right neural foramen suggests involvement of the exiting right L5 nerve root. Visualized portions of the sacrum are normal. There is no abnormal enhancement. The conus is normal in contour and signal characteristics. Paraspinal soft tissues are unremarkable. DXA     DXA 6/01/2020: (excluded None) lumbar spine L1-L4 T score 1.7 (BMD 1.402 g/cm2), left femoral neck T score: -0.6 (0.864 g/cm2), left total hip T score: 0.2 (1.027 g/cm2), right femoral neck T score: -1.3 (0.864 g/cm2), right total hip T score: -0.7 (0.921 g/cm2), and distal one third left radius T score -3.3 (BMD 0.588 g/cm2).  FRAX score 6.6 % probability in 10 years for major osteoporotic fracture and 0.7 % 10 year probability of hip fracture. DXA 8/28/2017: (excluded None) showed lumbar spine L1-L4 T score 1.2 (BMD 1.345 g/cm2), left femoral neck T score -0.9 (0.919 g/cm2), left total hip T score: 0.1 (1.020 g/cm2), right femoral neck T score: -0.4 (0.977 g/cm2), right total hip T score: -0.6 (0.938 g/cm2), and distal one third left radius T score N/A (BMD N/A g/cm2). FRAX score 5.8 % probability in 10 years for major osteoporotic fracture and 0.4 % 10 year probability of hip fracture. ASSESSMENT AND PLAN    This is a follow up visit for Ms. Davis Esquivel. 1) Seropositive Erosive Rheumatoid Arthritis. She is maintained on methotrexate 25 mg subcutaneously every Wednesday and Actemra 162 mg every Monday with good tolerance. She denies breakthrough. She feels swell. She denies inflammatory joint symptoms. Her CDAI was 0 (previously 1, 0, 2, 17, 6, 6, 14, 33.5, 42, 36, 17, 3, 5) with 0 tender and 0 swollen joints, consistent with remission. I will continue treatment. She declines COVID vaccine. 2) Long Term Use of Immunosuppressants. The patient remains on high risk immunomodulatory medications (methotrexate, Actemra) and requires frequent toxicity monitoring by blood work to evaluate for toxicities. Respective labs were ordered (see below orders for details). 3) Vitamin D Deficiency. Her vitamin D level was 44.8 (previously 38.2, 30.8, 30.7, 33.2, 28.5, 48.1, 41.1, 24.7). She is on weekly ergocalciferol 50,000. I will check her level. 4)  Age-Related Osteoporosis. Her DXA 6/01/2020 showed distal one third left radius T score -3.3 (BMD 0.588 g/cm2). She started Prolia 60 mg every 6 months on 7/20/2020 with good tolerance. Her most recent dose was 7/19/2021. I will continue. 5) Positive Lupus Anticoagulant with negative Confirm. Her lupus anticoagulant confirm was negative (less than 1.5), therefore she does not have lupus anticoagulant related anti-phospholipid syndrome. 6) Leukopenia. This is due to Actemra. This is stable. 7) CKD Stage 2. This resolved. Her eGFR was 83. 8) Musculoskeletal Neck Pain. This was not an active issue today. I had recommend physical therapy. 9) Fibromyalgia. This resolved. The patient voiced understanding of the aforementioned assessment and plan. A total of 43 minutes was spent on this visit, reviewing interval notes, interval testing results, ordering tests, refilling medications, documenting the findings in the note, patient education, counseling, and coordination of care as described above. All questions asked and answered.     TODAY'S ORDERS    Orders Placed This Encounter    METHOTREXATE POLYGLUTAMATES    CBC WITH AUTOMATED DIFF    METABOLIC PANEL, COMPREHENSIVE    C REACTIVE PROTEIN, QT    SED RATE (ESR)    LIPID PANEL    QUANTIFERON-TB PLUS(CLIENT INCUB.)    HEP B SURFACE AG    HEP B SURFACE AB    HBV CORE AB, IGG/IGM    HEPATITIS C AB    HEPATITIS BE AB    PTH INTACT    PROTEIN ELECTROPHORESIS W/ REFLX BELGICA    VITAMIN D, 25 HYDROXY    TSH+T3+FREE T4+T3 FREE    HEMOGLOBIN A1C WITH EAG     Future Appointments   Date Time Provider Constance Abreu   1/17/2022 11:00 AM INFUSIONINJ_RC AOCR BS AMB   3/2/2022 10:40 AM Rodri Bear MD AOCR BS AMB     Jluis Thornton MD, [facility]    Adult Rheumatology   Rheumatology Ultrasound Certified  [facility]  A Part of [facility], [address] [place] [address]   Phone [phone]  Fax [phone]

## 2021-09-02 NOTE — LETTER
9/2/2021    Patient: Valeri Esteves   YOB: 1950   Date of Visit: 9/2/2021     Michele Teran MD  [place] [id number]  Via Fax: [phone]    Dear Michele Teran MD,      Thank you for referring Ms. Brigido Trevizo to [facility] for evaluation. My notes for this consultation are attached. If you have questions, please do not hesitate to call me. I look forward to following your patient along with you.       Sincerely,    Zaria Yanez MD

## 2022-01-12 DIAGNOSIS — M81.0 AGE-RELATED OSTEOPOROSIS WITHOUT CURRENT PATHOLOGICAL FRACTURE: Primary | ICD-10-CM

## 2022-01-13 ENCOUNTER — LAB ONLY (OUTPATIENT)
Dept: FAMILY MEDICINE CLINIC | Age: 72
End: 2022-01-13

## 2022-01-13 DIAGNOSIS — M81.0 AGE-RELATED OSTEOPOROSIS WITHOUT CURRENT PATHOLOGICAL FRACTURE: ICD-10-CM

## 2022-01-14 LAB
ALBUMIN SERPL-MCNC: 4 G/DL (ref 3.5–5)
ANION GAP SERPL CALC-SCNC: 3 MMOL/L (ref 5–15)
BUN SERPL-MCNC: 8 MG/DL (ref 6–20)
BUN/CREAT SERPL: 9 (ref 12–20)
CALCIUM SERPL-MCNC: 9.5 MG/DL (ref 8.5–10.1)
CHLORIDE SERPL-SCNC: 111 MMOL/L (ref 97–108)
CO2 SERPL-SCNC: 26 MMOL/L (ref 21–32)
CREAT SERPL-MCNC: 0.86 MG/DL (ref 0.55–1.02)
GLUCOSE SERPL-MCNC: 102 MG/DL (ref 65–100)
PHOSPHATE SERPL-MCNC: 2.8 MG/DL (ref 2.6–4.7)
POTASSIUM SERPL-SCNC: 3.9 MMOL/L (ref 3.5–5.1)
SODIUM SERPL-SCNC: 140 MMOL/L (ref 136–145)

## 2022-01-19 NOTE — PROGRESS NOTES
[facility] Rheumatology  OBIC Note    Date: 2022    Rheumatology OBIC COVID-19 SCREENING  The patient was asked the following questions and answered as documented below:    1. Do you have any symptoms of COVID-19? SOB, coughing, fever, or generally not feeling well? NO  2. Have you been exposed to COVID-19 recently? NO  3. Have you had any recent contact with family/friend that has a pending COVID test? NO    Name: Jake Bales  MRN: [id number]       : 1950  Diagnosis: Osteoporosis  Treatment: Prolia  Referring Provider: Dr. Ronda Rodriguez  Supervising Provider: Dr. Christiano Dempsey    Patient arrived to [facility] at [id number] [phone]. Ms. Mary Young allergies reviewed and she agreed to receiving today's treatment. Pt. denies new complaints today. Visit Vitals  BP (!) 161/94   Pulse 86   Temp 97 °F (36.1 °C)   Resp 18   SpO2 95%      Recent labs results:  Results for Joaquim Craig (MRN [id number]) as of 2022 11:45   2022 12:22   Sodium 140   Potassium 3.9   Chloride 111 (H)   CO2 26   Anion gap 3 (L)   Glucose 102 (H)   BUN 8   Creatinine 0.86   BUN/Creatinine ratio 9 (L)   Calcium 9.5   Phosphorus 2.8   GFR est non-AA >60   GFR est AA >60   Albumin 4.0     Medications given per providers orders:  Administrations This Visit     denosumab (PROLIA) injection 60 mg     Admin Date  2022 Action  Given Dose  60 mg Route  SubCUTAneous Administered By  Cecille Pavon RN            Prolia to right lower posterior arm  NDC 87975-308-28  Lot # [id number]  Exp 2024    Ms. Peterson tolerated the treatment without complaints and no medication reactions were seen while in presence of this RN. Patient provided with AVS, which included future appointments and written educational material regarding Prolia. All of the patients questions were answered and then discharged ambulatory with rollator/walker from Rheumatology OBIC in stable condition at 1200. Pt in Waiting room awaiting caravan for transport home.      Future Appointments   Date Time Provider Constance Abreu   1/20/2022  1:00 PM INFUSIONINJ_RC AOCR BS AMB   3/16/2022 11:30 AM Shweta Dsouza MD AOCR BS AMB   7/12/2022  1:00 PM LAB ONLY [facility]   7/20/2022  1:00 PM INFUSIONINJ_RC AOCR BS AMB     Vel Sanchez RN  January 19, 2022  12:15pm

## 2022-01-20 ENCOUNTER — OFFICE VISIT (OUTPATIENT)
Dept: RHEUMATOLOGY | Age: 72
End: 2022-01-20
Payer: COMMERCIAL

## 2022-01-20 VITALS
HEART RATE: 86 BPM | SYSTOLIC BLOOD PRESSURE: 161 MMHG | OXYGEN SATURATION: 95 % | RESPIRATION RATE: 18 BRPM | DIASTOLIC BLOOD PRESSURE: 94 MMHG | TEMPERATURE: 97 F

## 2022-01-20 DIAGNOSIS — M81.0 AGE-RELATED OSTEOPOROSIS WITHOUT CURRENT PATHOLOGICAL FRACTURE: Primary | ICD-10-CM

## 2022-01-20 PROCEDURE — 96372 THER/PROPH/DIAG INJ SC/IM: CPT

## 2022-01-20 RX ORDER — ALBUTEROL SULFATE 0.83 MG/ML
2.5 SOLUTION RESPIRATORY (INHALATION) AS NEEDED
Status: CANCELLED
Start: 2022-07-17

## 2022-01-20 RX ORDER — ACETAMINOPHEN 325 MG/1
650 TABLET ORAL AS NEEDED
Status: CANCELLED
Start: 2022-07-17

## 2022-01-20 RX ORDER — EPINEPHRINE 1 MG/ML
0.3 INJECTION, SOLUTION, CONCENTRATE INTRAVENOUS AS NEEDED
Status: CANCELLED | OUTPATIENT
Start: 2022-07-17

## 2022-01-20 RX ORDER — DIPHENHYDRAMINE HYDROCHLORIDE 50 MG/ML
25 INJECTION, SOLUTION INTRAMUSCULAR; INTRAVENOUS AS NEEDED
Status: CANCELLED
Start: 2022-07-17

## 2022-01-20 RX ORDER — ONDANSETRON 2 MG/ML
8 INJECTION INTRAMUSCULAR; INTRAVENOUS AS NEEDED
Status: CANCELLED | OUTPATIENT
Start: 2022-07-17

## 2022-01-20 RX ORDER — HYDROCORTISONE SODIUM SUCCINATE 100 MG/2ML
100 INJECTION, POWDER, FOR SOLUTION INTRAMUSCULAR; INTRAVENOUS AS NEEDED
Status: CANCELLED | OUTPATIENT
Start: 2022-07-17

## 2022-01-20 RX ORDER — DIPHENHYDRAMINE HYDROCHLORIDE 50 MG/ML
50 INJECTION, SOLUTION INTRAMUSCULAR; INTRAVENOUS AS NEEDED
Status: CANCELLED
Start: 2022-07-17

## 2022-03-16 ENCOUNTER — OFFICE VISIT (OUTPATIENT)
Dept: RHEUMATOLOGY | Age: 72
End: 2022-03-16
Payer: MEDICARE

## 2022-03-16 VITALS
SYSTOLIC BLOOD PRESSURE: 154 MMHG | BODY MASS INDEX: 49.23 KG/M2 | WEIGHT: 293 LBS | DIASTOLIC BLOOD PRESSURE: 90 MMHG | RESPIRATION RATE: 18 BRPM | TEMPERATURE: 98.1 F | HEART RATE: 52 BPM

## 2022-03-16 DIAGNOSIS — Z79.60 LONG-TERM USE OF IMMUNOSUPPRESSANT MEDICATION: ICD-10-CM

## 2022-03-16 DIAGNOSIS — M05.79 SEROPOSITIVE RHEUMATOID ARTHRITIS OF MULTIPLE SITES (HCC): Primary | ICD-10-CM

## 2022-03-16 DIAGNOSIS — M81.0 AGE-RELATED OSTEOPOROSIS WITHOUT CURRENT PATHOLOGICAL FRACTURE: ICD-10-CM

## 2022-03-16 PROCEDURE — G8753 SYS BP > OR = 140: HCPCS | Performed by: INTERNAL MEDICINE

## 2022-03-16 PROCEDURE — 3017F COLORECTAL CA SCREEN DOC REV: CPT | Performed by: INTERNAL MEDICINE

## 2022-03-16 PROCEDURE — 1101F PT FALLS ASSESS-DOCD LE1/YR: CPT | Performed by: INTERNAL MEDICINE

## 2022-03-16 PROCEDURE — G8427 DOCREV CUR MEDS BY ELIG CLIN: HCPCS | Performed by: INTERNAL MEDICINE

## 2022-03-16 PROCEDURE — G8417 CALC BMI ABV UP PARAM F/U: HCPCS | Performed by: INTERNAL MEDICINE

## 2022-03-16 PROCEDURE — 99215 OFFICE O/P EST HI 40 MIN: CPT | Performed by: INTERNAL MEDICINE

## 2022-03-16 PROCEDURE — G8755 DIAS BP > OR = 90: HCPCS | Performed by: INTERNAL MEDICINE

## 2022-03-16 PROCEDURE — G8510 SCR DEP NEG, NO PLAN REQD: HCPCS | Performed by: INTERNAL MEDICINE

## 2022-03-16 PROCEDURE — 1090F PRES/ABSN URINE INCON ASSESS: CPT | Performed by: INTERNAL MEDICINE

## 2022-03-16 PROCEDURE — G8536 NO DOC ELDER MAL SCRN: HCPCS | Performed by: INTERNAL MEDICINE

## 2022-03-16 RX ORDER — TOCILIZUMAB 180 MG/ML
162 INJECTION, SOLUTION SUBCUTANEOUS
Qty: 1.8 ML | Refills: 11 | Status: SHIPPED | OUTPATIENT
Start: 2022-03-16 | End: 2022-03-21 | Stop reason: SDUPTHER

## 2022-03-16 NOTE — PATIENT INSTRUCTIONS
1. Space out your Actemra injections to once every other week (every 14 days). 2. Continue 25mg weekly methotrexate, and daily folic acid. 3. Repeat labs ASAP, and once more in 3 months. 4. Return in July for your Prolia injection. 5. Xrays, any [facility] location would be OK, such as those associated with the hospitals ([facility], [address]). . you don't need an appointment. Most affordable copays may be at [facility]. [address] [facility] [address], [address] [place] [address]. Tel: [phone]    5. Return in 4 months. We can see you the same day you come in for your Prolia.

## 2022-03-16 NOTE — PROGRESS NOTES
REASON FOR VISIT    This is a follow up visit for Ms. Catrachita Yan for    ICD-10-CM   1.  Seropositive rheumatoid arthritis of multiple sites ([facility] M05.79     Inflammatory arthritis phenotype includes:  Anti-CCP positive: yes (21)  Rheumatoid factor positive: yes (94.8)  Erosive disease: yes  Extra-articular manifestations include: none    Immunosuppression Screening (10/15/2020):  Quantiferon TB: negative  PPD: negative (2016)  PPD: negative (8/16/2017)  Hepatitis B: negative  Hepatitis C: negative     Therapy History includes:  Current DMARD therapy include: methotrexate 25 mg subcutaneously every Monday, Actemra 162 mg every Monday (11/2018 to 3/2019, 9/16/2019 to present)   Prior DMARD therapy include: gold (one year), methotrexate 20 mg (PO), Humira 40 mg every 14 days (10/31/2017), Enbrel every Monday (2/2018 to 6/11/2018), Forestine Sovereign 11 mg XR (6/18/2018 to 9/17/2018),  Kevzara 200 mg every 14 days (3/19/2019 to 6/17/2019)  Discontinued DMARDs because of inefficacy: gold, Enbrel, Xeljanz 11 mg XR, Kevzara  Discontinued DMARDs because of side effects: Humira (rash), Kevzara (hives)    Bone Density Historical Synopsis    Height loss since age 29 (at least two inches): 0  Fracture history includes: no  Family history of hip fracture: no  Fall Risk: no    Daily calcium intake is 0 mg  Weekly vitamin D intake is 50,000 IU    Smoking history: no  Alcohol consumption: no  Prednisone history: yes    Exercise: no    Previous work-up for osteoporosis includes the following:  DEXA Scan: 6/01/2020  Vitamin 25OH D level: 38.2 (10/15/2020)  PTH: 32 (10/15/2020)  TSH: 1.60 (10/15/2020)    Therapy History includes:  Current osteoporosis therapy includes: Prolia 60 mg every 6 months (7/20/2020 to present)  Prior osteoporosis therapy includes: none  The following osteoporosis therapy have been ineffective: none  The following osteoporosis therapy were stopped because of side effects: none    HISTORY OF PRESENT ILLNESS    Ms. Catrachita Yan returns for a follow up visit. Remains on methotrexate 25mg weekly and Actemra every Sunday. Can't tell when she is due for her next shot, does OK symptomatically with more extended spacing of doses. Joint pains have been dramatically better since she started weekly Actemra though. Vision getting more blurred, notices when trying to watch TV. No redness or tearing. Has ophthalmology appointment in a week. No breathing complaints, no cough. Tolerating Prolia injections well. Last injection was 1/20/22. Denies recent falls. Daughter recently had her get a lifecall necklace. Pt does live alone, daughter is in area and administers her shots for her. The patient has not had any interval hospital admissions, infections, or surgeries. REVIEW OF SYSTEMS    A comprehensive review of systems was performed and pertinent results are documented in the HPI, review of systems is otherwise non-contributory. PAST MEDICAL HISTORY    She has a past medical history of Anemia, Fibromyalgia, Hypertension, Osteoarthritis, Osteoporosis, Pulmonary embolism ([facility] and Rheumatoid arthritis ([facility] FAMILY HISTORY    Her family history includes Arthritis-rheumatoid in her mother; Cancer in her father; Diabetes in her maternal grandmother and son; Heart Attack in her paternal grandmother; Schizophrenia in her son. SOCIAL HISTORY    She reports that she has never smoked. She has never used smokeless tobacco. She reports that she does not drink alcohol and does not use drugs. MEDICATIONS    Current Outpatient Medications   Medication Sig    folic acid (FOLVITE) 1 mg tablet Take 1 Tablet by mouth daily.  methotrexate, PF, 25 mg/mL injection INJECT 1 ML SUBCUTANEOUSLY EVERY MONDAY. VIAL IS FOR SINGLE USE ONLY, DISCARD IMMEDIATELY AFTER USE.  ergocalciferol (ERGOCALCIFEROL) 1,250 mcg (50,000 unit) capsule Take 1 Capsule by mouth every seven (7) days.     Insulin Syringe-Needle U-100 (Droplet Insulin Syringe) 1 mL 30 gauge x 5/16 syrg USE TO INJECT METHOTREXATE ONE TIME WEEKLY    rivaroxaban (Xarelto) 10 mg tablet Take  by mouth daily.  tocilizumab (Actemra) 162 mg/0.9 mL syringe INJECT 0.9ML (162MG) UNDER THE SKIN ONCE EVERY MONDAY    denosumab (Prolia) 60 mg/mL injection 60 mg by SubCUTAneous route every 6 months.  acetaminophen (TYLENOL ARTHRITIS PAIN) 650 mg TbER Take 650 mg by mouth as needed.  IRON, FERROUS SULFATE, PO Take 65 mg by mouth daily.  multivitamin (ONE A DAY) tablet Take 1 Tab by mouth daily.  metoprolol tartrate (LOPRESSOR) 50 mg tablet two (2) times a day. No current facility-administered medications for this visit. ALLERGIES    Allergies   Allergen Reactions    Clindamycin Swelling and Other (comments)     fever    Humira [Adalimumab] Hives       PHYSICAL EXAMINATION    Visit Vitals  BP (!) 154/90   Pulse (!) 52   Temp 98.1 °F (36.7 °C)   Resp 18   Wt 305 lb (138.3 kg)   BMI 49.23 kg/m²     Body mass index is 49.23 kg/m². General:  The patient is well developed, well nourished, alert, and in no apparent distress. Eyes: Sclera are anicteric. No conjunctival injection. HEENT:  Oropharynx is clear. No oral ulcers. Adequate salivary pooling. No cervical or supraclavicular lymphadenopathy. Lungs:  Clear to auscultation bilaterally, without wheeze or stridor. Normal respiratory effort. Cor:  Regular rate and rhythm. No murmurs rubs or gallops. Abdomen: Soft, non-tender, without hepatomegaly or masses. Extremities: No calf tenderness or edema. Warm and well perfused. Skin:  No significant abnormalities. Neuro: Nonfocal  Musculoskeletal:    A comprehensive musculoskeletal exam was performed for all joints of each upper and lower extremity and assessed for swelling, tenderness and range of motion.  Results are documented as below:  Bilateral shoulder crepitus  Decreased flexion/extension of wrists  Left elbow extends to ~150deg  Right ankle limited subtalar ROM.  No evidence of synovitis in the small joints of the hands, wrists, shoulders, elbows, hips, knees or ankles. DATA REVIEW    Laboratory     Recent laboratory results were reviewed, summarized, and discussed with the patient. 1/13/22: Cr 0.86    Imaging    Musculoskeletal Ultrasound    None    Radiographs    Chest 3/15/2018: clear lungs. The cardiac and mediastinal contours and pulmonary vascularity are normal.  The bones and soft tissues are within normal limits. Bilateral Hand 7/31/2017: RIGHT: no fracture. There is diffuse osteopenia. Both corticated and non corticated erosive changes are present involving the radiocarpal joint, carpal joints, and metacarpal carpal joints. In addition, there is involvement of the first and second metacarpal phalangeal joints, most pronounced in the first. The lunate and radius appear ankylosed. LEFT:  no fracture. There is diffuse osteopenia. Both corticated and non corticated erosive changes are present involving the radiocarpal joint, carpal joints, and metacarpal carpal joints. The lunate and radius appear ankylosed. Some erosive changes are also present in the third PIP joint. The scaphoid lunate distance is widened. Left Elbow 7/31/2017: marked flattening of the radial head with sclerosis. In erosive changes also present of the proximal ulna. A moderate joint effusion is seen. Use osteopenia is noted. Bilateral Foot 7/31/2017: RIGHT: diffuse osteopenia. Non corticated erosions are present involving the second, fourth, and fifth MTP joints. Associated soft tissue swelling is present. The there may be an ankylosis of the fourth and third metatarsals to the midfoot. Flattening of the second metatarsal head is noted. There is a small Achilles and moderate the plantar spur. No fracture is seen. LEFT: diffuse osteopenia. Erosive changes are present at the second and third metatarsal tarsal joints. A small spur is noted at the Achilles insertion.  No fractures identified. Less forefoot soft tissue swelling is present. No fracture is seen    Chest 1/30/2017: lungs remain clear. No pneumothorax or pleural effusion apparent. Cardiac silhouette remains large. Endotracheal tube and nasogastric tube have been removed. Left central line stable in position with tip projecting over the superior vena cava. Right Hip 4/26/2016: no fracture or dislocation. The right hip joint spaces normal and symmetric with the left side. Enthesophytes are seen along either iliac crest and there is relatively severe bilateral facet hypertrophy at L5-S1. The sacroiliac joints appear grossly normal.     Right Femur 4/26/2016: the patient is status post prior placement of a 3 component right total knee prosthesis. From this examination a full assessment of the prosthesis is limited. Grossly this examination is negative for a loosening of the prosthetic components, heterotopic ossification, or additional complications of the prosthesis. The joint spaces of the right hip are well maintained without significant osteoarthritis. This examination is negative for a fracture or an insufficiency fracture of the proximal femur. This examination examination is negative for focal lytic or blastic lesions of the right femur. CT Imaging    CT Abdomen and Pelvis without contrast 7/31/2017:there are linear densities at both lung bases suggesting scarring or subsegmental atelectasis. The lung bases are otherwise clear no pleural effusion is seen. The liver spleen and pancreas are unremarkable. No renal stone or mass is seen. No ureteral dilatation or stone is seen. The urinary bladder is unremarkable. A urinary catheter is noted with tip within the urinary bladder. Small calcifications within the uterus are again noted. The uterus and pelvic adnexa are otherwise unremarkable. No dilated bowel or free air or free fluid is seen.  Specifically there is no evidence of retroperitoneal or intra-abdominal hemorrhage. An inferior vena cava filter is noted. MR Imaging    MRI Lumbar Spine with and without contrast 4/26/2016: study is suboptimal secondary to patient's body habitus. T12-L1: Normal for age. L1-L2: Normal for age. L2-L3: There is mild facet joint arthropathy. L3-L4: There is mild disc disease with concentric bulge. Moderate to severe facet arthropathy is present with bony hypertrophy and ligamentous thickening. There is moderate central canal stenosis and lateral recess narrowing. Bilateral foraminal narrowing is present. There is absence of fat within the right neural foramen suggesting involvement of the exiting L3 nerve root. L4-L5: There is mild disc disease with loss of disc height. Moderate to severe facet arthropathy is present with bony hypertrophy and ligamentous thickening. There is moderate central canal stenosis primarily in a transverse direction. Severe lateral recess narrowing is present with moderate bilateral foraminal narrowing. Absence of fat within the right neural foramen suggests involvement of the exiting right L4 nerve root. L5-S1: There is mild disc degeneration with loss of disc height. Moderate to severe facet arthropathy is present with bony hypertrophy and ligamentous thickening. No significant canal stenosis. There is mild lateral recess narrowing. Bilateral neural foraminal narrowing is also present, right greater than left. Absence of fat within the right neural foramen suggests involvement of the exiting right L5 nerve root. Visualized portions of the sacrum are normal. There is no abnormal enhancement. The conus is normal in contour and signal characteristics. Paraspinal soft tissues are unremarkable.      DXA     DXA 6/01/2020: (excluded None) lumbar spine L1-L4 T score 1.7 (BMD 1.402 g/cm2), left femoral neck T score: -0.6 (0.864 g/cm2), left total hip T score: 0.2 (1.027 g/cm2), right femoral neck T score: -1.3 (0.864 g/cm2), right total hip T score: -0.7 (0.921 g/cm2), and distal one third left radius T score -3.3 (BMD 0.588 g/cm2). FRAX score 6.6 % probability in 10 years for major osteoporotic fracture and 0.7 % 10 year probability of hip fracture. DXA 8/28/2017: (excluded None) showed lumbar spine L1-L4 T score 1.2 (BMD 1.345 g/cm2), left femoral neck T score -0.9 (0.919 g/cm2), left total hip T score: 0.1 (1.020 g/cm2), right femoral neck T score: -0.4 (0.977 g/cm2), right total hip T score: -0.6 (0.938 g/cm2), and distal one third left radius T score N/A (BMD N/A g/cm2). FRAX score 5.8 % probability in 10 years for major osteoporotic fracture and 0.4 % 10 year probability of hip fracture. ASSESSMENT AND PLAN    This is a follow up visit for Ms. Mary Carmen Dong for seropositive erosive rheumatoid arthritis, currently in medication-induced remission on methotrexate and weekly Actemra. We reviewed next steps for deescalating immunosuppression, and while Actemra is not explicitly labeled at her weight for every other week dosing, we do know this is well-tolerated at this interval with giant cell arteritis, and ethically it is problematic to continue her on weekly Actemra when she does well with every other week injections, given global shortages of Actemra caused by its use in the pandemic. Continuing full-dose subQ methotrexate for now concurrently. 1. Seropositive rheumatoid arthritis of multiple sites ([facility]  - Space out Actemra to every other week injections  - Cont methotrexate 25mg subQ weekly  - C REACTIVE PROTEIN, QT; Future  - CBC WITH AUTOMATED DIFF; Future  - METABOLIC PANEL, COMPREHENSIVE; Future  - SED RATE (ESR); Future  - QUANTIFERON-TB GOLD PLUS; Future  - C REACTIVE PROTEIN, QT; Future  - CBC WITH AUTOMATED DIFF; Future  - METABOLIC PANEL, COMPREHENSIVE; Future  - SED RATE (ESR); Future  - XR HAND RT MIN 3 V; Future  - XR HAND LT MIN 3 V; Future    2. Long-term use of immunosuppressant medication  - CBC WITH AUTOMATED DIFF;  Future  - METABOLIC PANEL, COMPREHENSIVE; Future  - QUANTIFERON-TB GOLD PLUS; Future  - CBC WITH AUTOMATED DIFF; Future  - METABOLIC PANEL, COMPREHENSIVE; Future      Patient Instructions   1. Space out your Actemra injections to once every other week (every 14 days). 2. Continue 25mg weekly methotrexate, and daily folic acid. 3. Repeat labs ASAP, and once more in 3 months. 4. Return in July for your Prolia injection. 5. Xrays, any [facility] location would be OK, such as those associated with the hospitals ([facility], [address]). . you don't need an appointment. Most affordable copays may be at [facility]. [address]. [facility], [address] [place] [address]. Tel: [phone]    5. Return in 4 months. We can see you the same day you come in for your Prolia.         TODAY'S ORDERS    Orders Placed This Encounter    QUANTIFERON-TB GOLD PLUS    XR HAND RT MIN 3 V    XR HAND LT MIN 3 V    C REACTIVE PROTEIN, QT    CBC WITH AUTOMATED DIFF    METABOLIC PANEL, COMPREHENSIVE    SED RATE (ESR)    C REACTIVE PROTEIN, QT    CBC WITH AUTOMATED DIFF    METABOLIC PANEL, COMPREHENSIVE    SED RATE (ESR)    DISCONTD: tocilizumab (Actemra) 162 mg/0.9 mL syringe     Future Appointments   Date Time Provider Constance Abreu   7/12/2022  1:00 PM LAB ONLY [facility] BS AMB   7/20/2022  1:00 PM INFUSIONINJ_RC [facility] BS AMB   7/20/2022  1:30 PM Shweta Dsouza MD [facility] BS AMB     Face to face time: 30 minutes  Note preparation and records review day of service: 15 minutes  Total provider time day of service: 39 minutes      Pavel Washington MD M[facility]    Adult Rheumatology   [facility]  A Part of [facility], [address]   Phone [phone]  Fax [phone]

## 2022-03-17 ENCOUNTER — TELEPHONE (OUTPATIENT)
Dept: RHEUMATOLOGY | Age: 72
End: 2022-03-17

## 2022-03-18 ENCOUNTER — APPOINTMENT (OUTPATIENT)
Dept: FAMILY MEDICINE CLINIC | Age: 72
End: 2022-03-18

## 2022-03-18 PROBLEM — M81.0 AGE-RELATED OSTEOPOROSIS WITHOUT CURRENT PATHOLOGICAL FRACTURE: Status: ACTIVE | Noted: 2020-07-07

## 2022-03-18 PROBLEM — M05.79 SEROPOSITIVE RHEUMATOID ARTHRITIS OF MULTIPLE SITES (HCC): Status: ACTIVE | Noted: 2017-07-31

## 2022-03-18 PROBLEM — Z66 DNR (DO NOT RESUSCITATE): Status: ACTIVE | Noted: 2017-05-01

## 2022-03-18 PROBLEM — I10 ESSENTIAL HYPERTENSION: Status: ACTIVE | Noted: 2017-03-31

## 2022-03-18 PROBLEM — M79.7 FIBROMYALGIA: Status: ACTIVE | Noted: 2017-07-31

## 2022-03-19 PROBLEM — Z79.60 LONG-TERM USE OF IMMUNOSUPPRESSANT MEDICATION: Status: ACTIVE | Noted: 2017-07-31

## 2022-03-19 PROBLEM — N18.2 CKD (CHRONIC KIDNEY DISEASE) STAGE 2, GFR 60-89 ML/MIN: Status: ACTIVE | Noted: 2017-08-14

## 2022-03-19 PROBLEM — M79.89 LEG SWELLING: Status: ACTIVE | Noted: 2017-07-31

## 2022-03-19 PROBLEM — E66.01 MORBID OBESITY WITH BMI OF 45.0-49.9, ADULT (HCC): Status: ACTIVE | Noted: 2017-07-31

## 2022-03-19 PROBLEM — I27.82 CHRONIC PULMONARY EMBOLISM (HCC): Status: ACTIVE | Noted: 2017-03-31

## 2022-03-19 LAB
ALBUMIN SERPL-MCNC: 4.6 G/DL (ref 3.8–4.8)
ALBUMIN/GLOB SERPL: 1.6 {RATIO} (ref 1.2–2.2)
ALP SERPL-CCNC: 32 IU/L (ref 44–121)
ALT SERPL-CCNC: 14 IU/L (ref 0–32)
AST SERPL-CCNC: 18 IU/L (ref 0–40)
BASOPHILS # BLD AUTO: 0.1 X10E3/UL (ref 0–0.2)
BASOPHILS NFR BLD AUTO: 2 %
BILIRUB SERPL-MCNC: 1 MG/DL (ref 0–1.2)
BUN SERPL-MCNC: 10 MG/DL (ref 8–27)
BUN/CREAT SERPL: 10 (ref 12–28)
CALCIUM SERPL-MCNC: 9.5 MG/DL (ref 8.7–10.3)
CHLORIDE SERPL-SCNC: 103 MMOL/L (ref 96–106)
CO2 SERPL-SCNC: 21 MMOL/L (ref 20–29)
CREAT SERPL-MCNC: 0.97 MG/DL (ref 0.57–1)
CRP SERPL-MCNC: <1 MG/L (ref 0–10)
EGFR: 63 ML/MIN/1.73
EOSINOPHIL # BLD AUTO: 0.1 X10E3/UL (ref 0–0.4)
EOSINOPHIL NFR BLD AUTO: 2 %
ERYTHROCYTE [DISTWIDTH] IN BLOOD BY AUTOMATED COUNT: 13.5 % (ref 11.7–15.4)
ERYTHROCYTE [SEDIMENTATION RATE] IN BLOOD BY WESTERGREN METHOD: 3 MM/HR (ref 0–40)
GLOBULIN SER CALC-MCNC: 2.9 G/DL (ref 1.5–4.5)
GLUCOSE SERPL-MCNC: 97 MG/DL (ref 65–99)
HCT VFR BLD AUTO: 37.9 % (ref 34–46.6)
HGB BLD-MCNC: 12.6 G/DL (ref 11.1–15.9)
IMM GRANULOCYTES # BLD AUTO: 0 X10E3/UL (ref 0–0.1)
IMM GRANULOCYTES NFR BLD AUTO: 0 %
LYMPHOCYTES # BLD AUTO: 0.8 X10E3/UL (ref 0.7–3.1)
LYMPHOCYTES NFR BLD AUTO: 25 %
MCH RBC QN AUTO: 34.1 PG (ref 26.6–33)
MCHC RBC AUTO-ENTMCNC: 33.2 G/DL (ref 31.5–35.7)
MCV RBC AUTO: 103 FL (ref 79–97)
MONOCYTES # BLD AUTO: 0.3 X10E3/UL (ref 0.1–0.9)
MONOCYTES NFR BLD AUTO: 11 %
NEUTROPHILS # BLD AUTO: 1.8 X10E3/UL (ref 1.4–7)
NEUTROPHILS NFR BLD AUTO: 60 %
PLATELET # BLD AUTO: 176 X10E3/UL (ref 150–450)
POTASSIUM SERPL-SCNC: 4.1 MMOL/L (ref 3.5–5.2)
PROT SERPL-MCNC: 7.5 G/DL (ref 6–8.5)
RBC # BLD AUTO: 3.69 X10E6/UL (ref 3.77–5.28)
SODIUM SERPL-SCNC: 141 MMOL/L (ref 134–144)
WBC # BLD AUTO: 3 X10E3/UL (ref 3.4–10.8)

## 2022-03-20 PROBLEM — E55.9 VITAMIN D DEFICIENCY: Status: ACTIVE | Noted: 2017-08-14

## 2022-03-21 DIAGNOSIS — M05.79 SEROPOSITIVE RHEUMATOID ARTHRITIS OF MULTIPLE SITES (HCC): ICD-10-CM

## 2022-03-21 RX ORDER — TOCILIZUMAB 180 MG/ML
162 INJECTION, SOLUTION SUBCUTANEOUS
Qty: 1.8 ML | Refills: 11 | Status: SHIPPED | OUTPATIENT
Start: 2022-03-21 | End: 2022-04-26 | Stop reason: SDUPTHER

## 2022-03-23 ENCOUNTER — TELEPHONE (OUTPATIENT)
Dept: RHEUMATOLOGY | Age: 72
End: 2022-03-23

## 2022-03-23 ENCOUNTER — APPOINTMENT (OUTPATIENT)
Dept: FAMILY MEDICINE CLINIC | Age: 72
End: 2022-03-23

## 2022-03-23 LAB
GAMMA INTERFERON BACKGROUND BLD IA-ACNC: 0.05 IU/ML
M TB IFN-G BLD-IMP: ABNORMAL
M TB IFN-G CD4+ BCKGRND COR BLD-ACNC: 0.05 IU/ML
MITOGEN IGNF BLD-ACNC: 0.19 IU/ML
QUANTIFERON INCUBATION, QF1T: ABNORMAL
QUANTIFERON TB2 AG: 0.06 IU/ML
SERVICE CMNT-IMP: ABNORMAL

## 2022-03-23 NOTE — TELEPHONE ENCOUNTER
[facility] Called looking to confirm the frequency of Actemra. Per Dr Alfonso Torres pt to take every other week, but  recommends weekly due to patient's weight. Call back number is [phone].

## 2022-03-23 NOTE — TELEPHONE ENCOUNTER
Spoke with [facility] and clarified Actemra frequency of every 14 days. Dr. Stephen Carvajal also spoke with them on the phone and provided clarification as to why he wants her on every 14 days as opposed to once weekly. They stated an understanding.

## 2022-04-26 DIAGNOSIS — M05.79 SEROPOSITIVE RHEUMATOID ARTHRITIS OF MULTIPLE SITES (HCC): ICD-10-CM

## 2022-04-26 RX ORDER — TOCILIZUMAB 180 MG/ML
162 INJECTION, SOLUTION SUBCUTANEOUS
Qty: 3.6 ML | Refills: 11 | Status: SHIPPED | OUTPATIENT
Start: 2022-04-26

## 2022-04-26 NOTE — PROGRESS NOTES
Pt describes return of widespread inflammatory arthritis since decreasing Actemra to every other week. Has not had problems to date getting injections weekly. Not yet interested in switching drug class to Orencia, which she hasn't taken previously. Reasonable to return to weekly injections given this previously controlled her arthritis, and despite the low WBC she was not having infections.   Will evaluate response at next visit

## 2022-04-29 DIAGNOSIS — Z79.60 LONG-TERM USE OF IMMUNOSUPPRESSANT MEDICATION: ICD-10-CM

## 2022-04-29 DIAGNOSIS — M05.79 SEROPOSITIVE RHEUMATOID ARTHRITIS OF MULTIPLE SITES (HCC): ICD-10-CM

## 2022-05-02 RX ORDER — METHOTREXATE 25 MG/ML
INJECTION INTRA-ARTERIAL; INTRAMUSCULAR; INTRATHECAL; INTRAVENOUS
Qty: 24 ML | Refills: 2 | Status: SHIPPED | OUTPATIENT
Start: 2022-05-02 | End: 2022-07-20 | Stop reason: SDUPTHER

## 2022-06-23 LAB
ALBUMIN SERPL-MCNC: 4.4 G/DL (ref 3.8–4.8)
ALBUMIN/GLOB SERPL: 1.5 {RATIO} (ref 1.2–2.2)
ALP SERPL-CCNC: 38 IU/L (ref 44–121)
ALT SERPL-CCNC: 13 IU/L (ref 0–32)
AST SERPL-CCNC: 17 IU/L (ref 0–40)
BASOPHILS # BLD AUTO: 0 X10E3/UL (ref 0–0.2)
BASOPHILS NFR BLD AUTO: 1 %
BILIRUB SERPL-MCNC: 0.8 MG/DL (ref 0–1.2)
BUN SERPL-MCNC: 12 MG/DL (ref 8–27)
BUN/CREAT SERPL: 13 (ref 12–28)
CALCIUM SERPL-MCNC: 9.6 MG/DL (ref 8.7–10.3)
CHLORIDE SERPL-SCNC: 105 MMOL/L (ref 96–106)
CO2 SERPL-SCNC: 22 MMOL/L (ref 20–29)
CREAT SERPL-MCNC: 0.9 MG/DL (ref 0.57–1)
CRP SERPL-MCNC: <1 MG/L (ref 0–10)
EGFR: 69 ML/MIN/1.73
EOSINOPHIL # BLD AUTO: 0.1 X10E3/UL (ref 0–0.4)
EOSINOPHIL NFR BLD AUTO: 3 %
ERYTHROCYTE [DISTWIDTH] IN BLOOD BY AUTOMATED COUNT: 13.5 % (ref 11.7–15.4)
ERYTHROCYTE [SEDIMENTATION RATE] IN BLOOD BY WESTERGREN METHOD: 19 MM/HR (ref 0–40)
GLOBULIN SER CALC-MCNC: 2.9 G/DL (ref 1.5–4.5)
GLUCOSE SERPL-MCNC: 94 MG/DL (ref 65–99)
HCT VFR BLD AUTO: 35.5 % (ref 34–46.6)
HGB BLD-MCNC: 12.1 G/DL (ref 11.1–15.9)
IMM GRANULOCYTES # BLD AUTO: 0 X10E3/UL (ref 0–0.1)
IMM GRANULOCYTES NFR BLD AUTO: 0 %
LYMPHOCYTES # BLD AUTO: 0.7 X10E3/UL (ref 0.7–3.1)
LYMPHOCYTES NFR BLD AUTO: 31 %
MCH RBC QN AUTO: 33.5 PG (ref 26.6–33)
MCHC RBC AUTO-ENTMCNC: 34.1 G/DL (ref 31.5–35.7)
MCV RBC AUTO: 98 FL (ref 79–97)
MONOCYTES # BLD AUTO: 0.2 X10E3/UL (ref 0.1–0.9)
MONOCYTES NFR BLD AUTO: 10 %
NEUTROPHILS # BLD AUTO: 1.2 X10E3/UL (ref 1.4–7)
NEUTROPHILS NFR BLD AUTO: 55 %
PLATELET # BLD AUTO: 172 X10E3/UL (ref 150–450)
POTASSIUM SERPL-SCNC: 4.5 MMOL/L (ref 3.5–5.2)
PROT SERPL-MCNC: 7.3 G/DL (ref 6–8.5)
RBC # BLD AUTO: 3.61 X10E6/UL (ref 3.77–5.28)
SODIUM SERPL-SCNC: 141 MMOL/L (ref 134–144)
WBC # BLD AUTO: 2.2 X10E3/UL (ref 3.4–10.8)

## 2022-06-30 ENCOUNTER — TELEPHONE (OUTPATIENT)
Dept: RHEUMATOLOGY | Age: 72
End: 2022-06-30

## 2022-07-12 ENCOUNTER — APPOINTMENT (OUTPATIENT)
Dept: FAMILY MEDICINE CLINIC | Age: 72
End: 2022-07-12

## 2022-07-12 DIAGNOSIS — M81.0 AGE-RELATED OSTEOPOROSIS WITHOUT CURRENT PATHOLOGICAL FRACTURE: Primary | ICD-10-CM

## 2022-07-12 DIAGNOSIS — M81.0 AGE-RELATED OSTEOPOROSIS WITHOUT CURRENT PATHOLOGICAL FRACTURE: ICD-10-CM

## 2022-07-12 LAB
ALBUMIN SERPL-MCNC: 4 G/DL (ref 3.5–5)
ANION GAP SERPL CALC-SCNC: 5 MMOL/L (ref 5–15)
BUN SERPL-MCNC: 14 MG/DL (ref 6–20)
BUN/CREAT SERPL: 16 (ref 12–20)
CALCIUM SERPL-MCNC: 9.1 MG/DL (ref 8.5–10.1)
CHLORIDE SERPL-SCNC: 109 MMOL/L (ref 97–108)
CO2 SERPL-SCNC: 26 MMOL/L (ref 21–32)
CREAT SERPL-MCNC: 0.89 MG/DL (ref 0.55–1.02)
GLUCOSE SERPL-MCNC: 87 MG/DL (ref 65–100)
PHOSPHATE SERPL-MCNC: 2.7 MG/DL (ref 2.6–4.7)
POTASSIUM SERPL-SCNC: 4.2 MMOL/L (ref 3.5–5.1)
SODIUM SERPL-SCNC: 140 MMOL/L (ref 136–145)

## 2022-07-12 NOTE — PROGRESS NOTES
[employer] [facility]  [facility] Note    Date: 2022  Name: Lord Niño  MRN: [id number]       : 1950  Diagnosis: Osteoporosis  Treatment: Prolia  Referring Provider: Dr. Marce Puente  Supervising Provider: Dr. Marce Puente    Patient arrived to [facility] at [id number]. Ms. Tracey Collado allergies reviewed and she agreed to receiving today's treatment. A physical assessment was performed initially and post-treatment. Pt. denies new complaints today. Visit Vitals  Pulse 83   Temp 98 °F (36.7 °C)   Resp 17   SpO2 93%      Recent labs results:   Latest Reference Range & Units 22 14:22   Sodium 136 - 145 mmol/L 140   Potassium 3.5 - 5.1 mmol/L 4.2   Chloride 97 - 108 mmol/L 109 (H)   CO2 21 - 32 mmol/L 26   Anion gap 5 - 15 mmol/L 5   Glucose 65 - 100 mg/dL 87   BUN 6 - 20 MG/DL 14   Creatinine 0.55 - 1.02 MG/DL 0.89   BUN/Creatinine ratio 12 - 20   16   Calcium 8.5 - 10.1 MG/DL 9.1   Phosphorus 2.6 - 4.7 MG/DL 2.7   GFR est non-AA >60 ml/min/1.73m2 >60   GFR est AA >60 ml/min/1.73m2 >60   (H): Data is abnormally high    Medications given per providers orders:  Administrations This Visit       denosumab (PROLIA) injection 60 mg       Admin Date  2022 Action  Given Dose  60 mg Route  SubCUTAneous Administered By  Susanna Malave RN                     Prolia to right arm  . [address] [id number]  Lot # [id number]  Exp 10/2024     Ms. Peterson tolerated the treatment without complaints and no medication reactions were seen while in presence of this RN. Patient provided with AVS, which included future appointments and written educational material regarding Prolia. All of the patients questions were answered and then discharged ambulatory with rollator/walker from Rheumatology OBIC in stable condition at 1310. Encounter routed to supervising provider for co-signing.      Future Appointments   Date Time Provider Constance Abreu   2022  1:20 PM Reji Mann MD [facility] BS AMB   2022  1:00 PM LAB ONLY [facility] BS AMB 1/18/2023  1:00 PM INFUSIONINJ_RC [facility] BS SHY Gonzalez RN  July 20, 2022

## 2022-07-20 ENCOUNTER — OFFICE VISIT (OUTPATIENT)
Dept: RHEUMATOLOGY | Age: 72
End: 2022-07-20

## 2022-07-20 ENCOUNTER — OFFICE VISIT (OUTPATIENT)
Dept: RHEUMATOLOGY | Age: 72
End: 2022-07-20
Payer: MEDICARE

## 2022-07-20 VITALS
RESPIRATION RATE: 20 BRPM | TEMPERATURE: 98.8 F | OXYGEN SATURATION: 98 % | HEART RATE: 80 BPM | DIASTOLIC BLOOD PRESSURE: 81 MMHG | HEIGHT: 66 IN | WEIGHT: 293 LBS | BODY MASS INDEX: 47.09 KG/M2 | SYSTOLIC BLOOD PRESSURE: 124 MMHG

## 2022-07-20 VITALS — TEMPERATURE: 98 F | RESPIRATION RATE: 17 BRPM | OXYGEN SATURATION: 93 % | HEART RATE: 83 BPM

## 2022-07-20 DIAGNOSIS — M81.0 AGE-RELATED OSTEOPOROSIS WITHOUT CURRENT PATHOLOGICAL FRACTURE: Primary | ICD-10-CM

## 2022-07-20 DIAGNOSIS — Z79.60 LONG-TERM USE OF IMMUNOSUPPRESSANT MEDICATION: ICD-10-CM

## 2022-07-20 DIAGNOSIS — M05.79 SEROPOSITIVE RHEUMATOID ARTHRITIS OF MULTIPLE SITES (HCC): ICD-10-CM

## 2022-07-20 PROCEDURE — 96372 THER/PROPH/DIAG INJ SC/IM: CPT

## 2022-07-20 PROCEDURE — G8752 SYS BP LESS 140: HCPCS | Performed by: INTERNAL MEDICINE

## 2022-07-20 PROCEDURE — 96401 CHEMO ANTI-NEOPL SQ/IM: CPT

## 2022-07-20 PROCEDURE — 1090F PRES/ABSN URINE INCON ASSESS: CPT | Performed by: INTERNAL MEDICINE

## 2022-07-20 PROCEDURE — 3017F COLORECTAL CA SCREEN DOC REV: CPT | Performed by: INTERNAL MEDICINE

## 2022-07-20 PROCEDURE — 1123F ACP DISCUSS/DSCN MKR DOCD: CPT | Performed by: INTERNAL MEDICINE

## 2022-07-20 PROCEDURE — G8417 CALC BMI ABV UP PARAM F/U: HCPCS | Performed by: INTERNAL MEDICINE

## 2022-07-20 PROCEDURE — G8754 DIAS BP LESS 90: HCPCS | Performed by: INTERNAL MEDICINE

## 2022-07-20 PROCEDURE — G8510 SCR DEP NEG, NO PLAN REQD: HCPCS | Performed by: INTERNAL MEDICINE

## 2022-07-20 PROCEDURE — 1101F PT FALLS ASSESS-DOCD LE1/YR: CPT | Performed by: INTERNAL MEDICINE

## 2022-07-20 PROCEDURE — G8536 NO DOC ELDER MAL SCRN: HCPCS | Performed by: INTERNAL MEDICINE

## 2022-07-20 PROCEDURE — G8427 DOCREV CUR MEDS BY ELIG CLIN: HCPCS | Performed by: INTERNAL MEDICINE

## 2022-07-20 PROCEDURE — 99214 OFFICE O/P EST MOD 30 MIN: CPT | Performed by: INTERNAL MEDICINE

## 2022-07-20 RX ORDER — ALBUTEROL SULFATE 0.83 MG/ML
2.5 SOLUTION RESPIRATORY (INHALATION) AS NEEDED
Start: 2023-01-15

## 2022-07-20 RX ORDER — SYRINGE-NEEDLE,INSULIN,0.5 ML 30 GX5/16"
SYRINGE, EMPTY DISPOSABLE MISCELLANEOUS
Qty: 100 EACH | Refills: 0 | Status: SHIPPED | OUTPATIENT
Start: 2022-07-20

## 2022-07-20 RX ORDER — ACETAMINOPHEN 325 MG/1
650 TABLET ORAL AS NEEDED
Start: 2023-01-15

## 2022-07-20 RX ORDER — DIPHENHYDRAMINE HYDROCHLORIDE 50 MG/ML
50 INJECTION, SOLUTION INTRAMUSCULAR; INTRAVENOUS AS NEEDED
Start: 2023-01-15

## 2022-07-20 RX ORDER — METHOTREXATE 25 MG/ML
20 INJECTION INTRA-ARTERIAL; INTRAMUSCULAR; INTRATHECAL; INTRAVENOUS
Qty: 24 ML | Refills: 2 | Status: SHIPPED | OUTPATIENT
Start: 2022-07-20

## 2022-07-20 RX ORDER — HYDROCORTISONE SODIUM SUCCINATE 100 MG/2ML
100 INJECTION, POWDER, FOR SOLUTION INTRAMUSCULAR; INTRAVENOUS AS NEEDED
OUTPATIENT
Start: 2023-01-15

## 2022-07-20 RX ORDER — ONDANSETRON 2 MG/ML
8 INJECTION INTRAMUSCULAR; INTRAVENOUS AS NEEDED
OUTPATIENT
Start: 2023-01-15

## 2022-07-20 RX ORDER — DIPHENHYDRAMINE HYDROCHLORIDE 50 MG/ML
25 INJECTION, SOLUTION INTRAMUSCULAR; INTRAVENOUS AS NEEDED
Start: 2023-01-15

## 2022-07-20 RX ORDER — EPINEPHRINE 1 MG/ML
0.3 INJECTION, SOLUTION, CONCENTRATE INTRAVENOUS AS NEEDED
OUTPATIENT
Start: 2023-01-15

## 2022-07-20 RX ORDER — FOLIC ACID 1 MG/1
1 TABLET ORAL DAILY
Qty: 90 TABLET | Refills: 5 | Status: SHIPPED | OUTPATIENT
Start: 2022-07-20

## 2022-07-20 NOTE — LETTER
8/16/2022    Patient: Laura Estrada   YOB: 1950   Date of Visit: 7/20/2022     Isabela Raygoza MD  [address] [address]  Via Fax: [phone]    Dear Isabela Raygoza MD,    We recently saw Ms. Lisa Walker in the [facility] for evaluation. My notes for this consultation are attached. If you have questions, please do not hesitate to call me. I look forward to following your patient along with you.       Sincerely,  Abigail Pizano MD S  Cell: [phone]

## 2022-07-20 NOTE — PROGRESS NOTES
REASON FOR VISIT    This is a follow up visit for Ms. Richardson Obando for    ICD-10-CM   1.  Seropositive rheumatoid arthritis of multiple sites ([facility] M05.79     Inflammatory arthritis phenotype includes:  Anti-CCP positive: yes (21)  Rheumatoid factor positive: yes (94.8)  Erosive disease: yes  Extra-articular manifestations include: none    Immunosuppression Screening (10/15/2020):  Quantiferon TB: negative  PPD: negative (2016)  PPD: negative (8/16/2017)  Hepatitis B: negative  Hepatitis C: negative     Therapy History includes:  Current DMARD therapy include: methotrexate 25 mg subcutaneously every Monday, Actemra 162 mg every Monday (11/2018 to 3/2019, 9/16/2019 to present)   Prior DMARD therapy include: gold (one year), methotrexate 20 mg (PO), Humira 40 mg every 14 days (10/31/2017), Enbrel every Monday (2/2018 to 6/11/2018), Jacquelene Cellar 11 mg XR (6/18/2018 to 9/17/2018),  Kevzara 200 mg every 14 days (3/19/2019 to 6/17/2019)  Discontinued DMARDs because of inefficacy: gold, Enbrel, Xeljanz 11 mg XR, Kevzara  Discontinued DMARDs because of side effects: Humira (rash), Kevzara (hives)    Bone Density Historical Synopsis    Height loss since age 29 (at least two inches): 0  Fracture history includes: no  Family history of hip fracture: no  Fall Risk: no    Daily calcium intake is 0 mg  Weekly vitamin D intake is 50,000 IU    Smoking history: no  Alcohol consumption: no  Prednisone history: yes    Exercise: no    Previous work-up for osteoporosis includes the following:  DEXA Scan: 6/01/2020  Vitamin 25OH D level: 38.2 (10/15/2020)  PTH: 32 (10/15/2020)  TSH: 1.60 (10/15/2020)    Therapy History includes:  Current osteoporosis therapy includes: Prolia 60 mg every 6 months (7/20/2020 to present)  Prior osteoporosis therapy includes: none  The following osteoporosis therapy have been ineffective: none  The following osteoporosis therapy were stopped because of side effects: none    HISTORY OF PRESENT ILLNESS    Ms. Richardson Obando returns for a follow up visit. Pt Is currently on a once a week  Actemra injection. She said that she can not feel increased pain when she is due for her next injection when she is on a weekly dose. She is also taking 1 mL of SubQ Methotrexate per week. Pt denies infections. Pt says that her Dr has wanted her to get an ankle replacement since 2005, but she never had them done. She does have both of her knees replaced. She wants a remote chair to ride in, but she can not get approved for one. Pt reports that she does not experience any joint stiffness in the morning. Pt notes of an itchy rash on her R arm. She is not sure what the cause of this rash is. Pt denies any breathing problems. REVIEW OF SYSTEMS    A comprehensive review of systems was performed and pertinent results are documented in the HPI, review of systems is otherwise non-contributory. PAST MEDICAL HISTORY    She has a past medical history of Anemia, Fibromyalgia, Hypertension, Osteoarthritis, Osteoporosis, Pulmonary embolism ([address] and Rheumatoid arthritis ([facility] FAMILY HISTORY    Her family history includes Arthritis-rheumatoid in her mother; Cancer in her father; Diabetes in her maternal grandmother and son; Heart Attack in her paternal grandmother; Schizophrenia in her son. SOCIAL HISTORY    She reports that she has never smoked. She has never used smokeless tobacco. She reports that she does not drink alcohol and does not use drugs. MEDICATIONS    Current Outpatient Medications   Medication Sig    methotrexate, PF, 25 mg/mL injection INJECT 1 ML SUBCUTANEOUSLY EVERY MONDAY. VIAL IS FOR SINGLE USE ONLY, DISCARD IMMEDIATELY AFTER USE.    tocilizumab (Actemra) 162 mg/0.9 mL syringe 0.9 mL by SubCUTAneous route every seven (7) days. folic acid (FOLVITE) 1 mg tablet Take 1 Tablet by mouth daily. ergocalciferol (ERGOCALCIFEROL) 1,250 mcg (50,000 unit) capsule Take 1 Capsule by mouth every seven (7) days. Insulin Syringe-Needle U-100 (Droplet Insulin Syringe) 1 mL 30 gauge x 5/16 syrg USE TO INJECT METHOTREXATE ONE TIME WEEKLY    rivaroxaban (Xarelto) 10 mg tablet Take  by mouth daily. denosumab (Prolia) 60 mg/mL injection 60 mg by SubCUTAneous route every 6 months. acetaminophen (TYLENOL ARTHRITIS PAIN) 650 mg TbER Take 650 mg by mouth as needed. IRON, FERROUS SULFATE, PO Take 65 mg by mouth daily. multivitamin (ONE A DAY) tablet Take 1 Tab by mouth daily. metoprolol tartrate (LOPRESSOR) 50 mg tablet two (2) times a day. No current facility-administered medications for this visit. ALLERGIES    Allergies   Allergen Reactions    Clindamycin Swelling and Other (comments)     fever    Humira [Adalimumab] Hives       PHYSICAL EXAMINATION    Visit Vitals  /81 (BP 1 Location: Right arm, BP Patient Position: Sitting)   Pulse 80   Temp 98.8 °F (37.1 °C) (Oral)   Resp 20   Ht 5' 6\" (1.676 m)   Wt 294 lb (133.4 kg)   SpO2 98%   BMI 47.45 kg/m²       Body mass index is 47.45 kg/m². General:  The patient is well developed, well nourished, alert, and in no apparent distress. Eyes: Sclera are anicteric. No conjunctival injection. HEENT:  Oropharynx is clear. No oral ulcers. Adequate salivary pooling. No cervical or supraclavicular lymphadenopathy. Lungs:  Clear to auscultation bilaterally, without wheeze or stridor. Normal respiratory effort. Cor:  Regular rate and rhythm. No murmurs rubs or gallops. Abdomen: Soft, non-tender, without hepatomegaly or masses. Extremities: No calf tenderness or edema. Warm and well perfused. Skin:  No significant abnormalities. Neuro: Nonfocal  Musculoskeletal:    A comprehensive musculoskeletal exam was performed for all joints of each upper and lower extremity and assessed for swelling, tenderness and range of motion.  Results are documented as below:  Bilateral shoulder crepitus  Decreased flexion/extension of wrists  Left elbow extends to ~150deg  Right ankle limited subtalar ROM. No evidence of synovitis in the small joints of the hands, wrists, shoulders, elbows, hips, knees or ankles. DATA REVIEW    Laboratory     Recent laboratory results were reviewed, summarized, and discussed with the patient. 7/12/22: Cr 0.89   6/2/22: ESR 19, Cr 0.9, AST 17, ALT 13, Alk phos 38, Tbili 0.8, WBC 2.2, HGB 12.1, Plt 172, CRP <1 mg/L  3/18/22: QuaniFERON plus indeterminate, ESR 3, Cr 0.97, ALT 14, AST 18, Alk phos 32, tbili 1, WBC 3, HGB 12.6, Plt 176, CRP <1 mg/L  1/13/22: Cr 0.86    Imaging    Musculoskeletal Ultrasound    None    Radiographs    XR HAND RT MIN 3 V, XR HAND LT MIN 3 V (3/23/22): Inactive bilateral hands rheumatoid arthritis    Chest 3/15/2018: clear lungs. The cardiac and mediastinal contours and pulmonary vascularity are normal.  The bones and soft tissues are within normal limits. Bilateral Hand 7/31/2017: RIGHT: no fracture. There is diffuse osteopenia. Both corticated and non corticated erosive changes are present involving the radiocarpal joint, carpal joints, and metacarpal carpal joints. In addition, there is involvement of the first and second metacarpal phalangeal joints, most pronounced in the first. The lunate and radius appear ankylosed. LEFT:  no fracture. There is diffuse osteopenia. Both corticated and non corticated erosive changes are present involving the radiocarpal joint, carpal joints, and metacarpal carpal joints. The lunate and radius appear ankylosed. Some erosive changes are also present in the third PIP joint. The scaphoid lunate distance is widened. Left Elbow 7/31/2017: marked flattening of the radial head with sclerosis. In erosive changes also present of the proximal ulna. A moderate joint effusion is seen. Use osteopenia is noted. Bilateral Foot 7/31/2017: RIGHT: diffuse osteopenia. Non corticated erosions are present involving the second, fourth, and fifth MTP joints.  Associated soft tissue swelling is present. The there may be an ankylosis of the fourth and third metatarsals to the midfoot. Flattening of the second metatarsal head is noted. There is a small Achilles and moderate the plantar spur. No fracture is seen. LEFT: diffuse osteopenia. Erosive changes are present at the second and third metatarsal tarsal joints. A small spur is noted at the Achilles insertion. No fractures identified. Less forefoot soft tissue swelling is present. No fracture is seen    Chest 1/30/2017: lungs remain clear. No pneumothorax or pleural effusion apparent. Cardiac silhouette remains large. Endotracheal tube and nasogastric tube have been removed. Left central line stable in position with tip projecting over the superior vena cava. Right Hip 4/26/2016: no fracture or dislocation. The right hip joint spaces normal and symmetric with the left side. Enthesophytes are seen along either iliac crest and there is relatively severe bilateral facet hypertrophy at L5-S1. The sacroiliac joints appear grossly normal.     Right Femur 4/26/2016: the patient is status post prior placement of a 3 component right total knee prosthesis. From this examination a full assessment of the prosthesis is limited. Grossly this examination is negative for a loosening of the prosthetic components, heterotopic ossification, or additional complications of the prosthesis. The joint spaces of the right hip are well maintained without significant osteoarthritis. This examination is negative for a fracture or an insufficiency fracture of the proximal femur. This examination examination is negative for focal lytic or blastic lesions of the right femur. CT Imaging    CT Abdomen and Pelvis without contrast 7/31/2017:there are linear densities at both lung bases suggesting scarring or subsegmental atelectasis. The lung bases are otherwise clear no pleural effusion is seen. The liver spleen and pancreas are unremarkable.  No renal stone or mass is seen. No ureteral dilatation or stone is seen. The urinary bladder is unremarkable. A urinary catheter is noted with tip within the urinary bladder. Small calcifications within the uterus are again noted. The uterus and pelvic adnexa are otherwise unremarkable. No dilated bowel or free air or free fluid is seen. Specifically there is no evidence of retroperitoneal or intra-abdominal hemorrhage. An inferior vena cava filter is noted. MR Imaging    MRI Lumbar Spine with and without contrast 4/26/2016: study is suboptimal secondary to patient's body habitus. T12-L1: Normal for age. L1-L2: Normal for age. L2-L3: There is mild facet joint arthropathy. L3-L4: There is mild disc disease with concentric bulge. Moderate to severe facet arthropathy is present with bony hypertrophy and ligamentous thickening. There is moderate central canal stenosis and lateral recess narrowing. Bilateral foraminal narrowing is present. There is absence of fat within the right neural foramen suggesting involvement of the exiting L3 nerve root. L4-L5: There is mild disc disease with loss of disc height. Moderate to severe facet arthropathy is present with bony hypertrophy and ligamentous thickening. There is moderate central canal stenosis primarily in a transverse direction. Severe lateral recess narrowing is present with moderate bilateral foraminal narrowing. Absence of fat within the right neural foramen suggests involvement of the exiting right L4 nerve root. L5-S1: There is mild disc degeneration with loss of disc height. Moderate to severe facet arthropathy is present with bony hypertrophy and ligamentous thickening. No significant canal stenosis. There is mild lateral recess narrowing. Bilateral neural foraminal narrowing is also present, right greater than left. Absence of fat within the right neural foramen suggests involvement of the exiting right L5 nerve root.  Visualized portions of the sacrum are normal. There is no abnormal enhancement. The conus is normal in contour and signal characteristics. Paraspinal soft tissues are unremarkable. DXA     DXA 6/01/2020: (excluded None) lumbar spine L1-L4 T score 1.7 (BMD 1.402 g/cm2), left femoral neck T score: -0.6 (0.864 g/cm2), left total hip T score: 0.2 (1.027 g/cm2), right femoral neck T score: -1.3 (0.864 g/cm2), right total hip T score: -0.7 (0.921 g/cm2), and distal one third left radius T score -3.3 (BMD 0.588 g/cm2). FRAX score 6.6 % probability in 10 years for major osteoporotic fracture and 0.7 % 10 year probability of hip fracture. DXA 8/28/2017: (excluded None) showed lumbar spine L1-L4 T score 1.2 (BMD 1.345 g/cm2), left femoral neck T score -0.9 (0.919 g/cm2), left total hip T score: 0.1 (1.020 g/cm2), right femoral neck T score: -0.4 (0.977 g/cm2), right total hip T score: -0.6 (0.938 g/cm2), and distal one third left radius T score N/A (BMD N/A g/cm2). FRAX score 5.8 % probability in 10 years for major osteoporotic fracture and 0.4 % 10 year probability of hip fracture. ASSESSMENT AND PLAN    This is a follow up visit for Ms. Chilango Mg for seropositive erosive rheumatoid arthritis, currently in medication-induced remission on methotrexate and weekly Actemra. Trialing wean to 20mg methotrexate subQ. 1. Seropositive rheumatoid arthritis of multiple sites ([facility]  - methotrexate, PF, 25 mg/mL injection; 0.8 mL by SubCUTAneous route every seven (7) days. Dispense: 24 mL; Refill: 2  - folic acid (FOLVITE) 1 mg tablet; Take 1 Tablet by mouth in the morning. Dispense: 90 Tablet; Refill: 5  - Insulin Syringe-Needle U-100 (Droplet Insulin Syringe) 1 mL 30 gauge x 5/16 syrg; USE TO INJECT METHOTREXATE ONE TIME WEEKLY  Dispense: 100 Each; Refill: 0  - C REACTIVE PROTEIN, QT; Future  - CBC WITH AUTOMATED DIFF; Future  - METABOLIC PANEL, COMPREHENSIVE; Future  - SED RATE (ESR); Future  - QUANTIFERON-TB GOLD PLUS; Future    2.  Long-term use of immunosuppressant medication  - methotrexate, PF, 25 mg/mL injection; 0.8 mL by SubCUTAneous route every seven (7) days. Dispense: 24 mL; Refill: 2  - folic acid (FOLVITE) 1 mg tablet; Take 1 Tablet by mouth in the morning. Dispense: 90 Tablet; Refill: 5  - Insulin Syringe-Needle U-100 (Droplet Insulin Syringe) 1 mL 30 gauge x 5/16 syrg; USE TO INJECT METHOTREXATE ONE TIME WEEKLY  Dispense: 100 Each; Refill: 0  - CBC WITH AUTOMATED DIFF; Future  - METABOLIC PANEL, COMPREHENSIVE; Future  - QUANTIFERON-TB GOLD PLUS; Future    Patient Instructions   1) Continue to take Actemra injections once a week. 2) Decrease your Methotrexate to 0.8 mL one time per week. Make sure to continue taking daily folic acid. 3) Repeat labs one month after you decrease your Methotrexate dose. 4) Follow up in 3-4 months. Call me if your pain worsens or you have any questions in the meantime.        TODAY'S ORDERS    Orders Placed This Encounter    QUANTIFERON-TB GOLD PLUS    C REACTIVE PROTEIN, QT    CBC WITH AUTOMATED DIFF    METABOLIC PANEL, COMPREHENSIVE    SED RATE (ESR)    methotrexate, PF, 25 mg/mL injection    folic acid (FOLVITE) 1 mg tablet    Insulin Syringe-Needle U-100 (Droplet Insulin Syringe) 1 mL 30 gauge x 5/16 syrg       Future Appointments   Date Time Provider Contsance Abreu   11/23/2022  1:00 PM Ishaan Del Rosario MD [facility] BS AMB   12/28/2022  1:00 PM LAB ONLY [facility] BS AMB   1/18/2023  1:00 PM INFUSIONINJ_RC [facility] BS AMB     Face to face time: 15 minutes  Note preparation and records review day of service: 20 minutes  Total provider time day of service: 35 minutes    This was scribed by Pat Bravo in the presence of Dr. Eagle Cheung MD S    Adult Rheumatology   [address]  [facility], [address]   Phone [phone]  Fax [phone]

## 2022-07-20 NOTE — PROGRESS NOTES
Chief Complaint   Patient presents with    Arthritis     1. Have you been to the ER, urgent care clinic since your last visit? Hospitalized since your last visit? No    2. Have you seen or consulted any other health care providers outside of the [address] [place] [address] since your last visit? Include any pap smears or colon screening.  No

## 2022-07-20 NOTE — PATIENT INSTRUCTIONS
1) Continue to take Actemra injections once a week. 2) Decrease your Methotrexate to 0.8 mL one time per week. Make sure to continue taking daily folic acid. 3) Repeat labs one month after you decrease your Methotrexate dose. 4) Follow up in 3-4 months. Call me if your pain worsens or you have any questions in the meantime.

## 2022-08-09 DIAGNOSIS — E55.9 VITAMIN D DEFICIENCY: ICD-10-CM

## 2022-08-09 DIAGNOSIS — Z79.60 LONG-TERM USE OF IMMUNOSUPPRESSANT MEDICATION: ICD-10-CM

## 2022-08-09 DIAGNOSIS — M05.79 SEROPOSITIVE RHEUMATOID ARTHRITIS OF MULTIPLE SITES (HCC): ICD-10-CM

## 2022-08-09 NOTE — TELEPHONE ENCOUNTER
Received faxed request for \"[id number] \"  Refill from [facility]. Last labs were done 6/22/22, last visit was 7/20/22, next visit is 11/23/22.

## 2022-08-11 RX ORDER — ERGOCALCIFEROL 1.25 MG/1
50000 CAPSULE ORAL
Qty: 12 CAPSULE | Refills: 4 | Status: SHIPPED | OUTPATIENT
Start: 2022-08-11

## 2022-09-01 LAB
ALBUMIN SERPL-MCNC: 4.5 G/DL (ref 3.8–4.8)
ALBUMIN/GLOB SERPL: 1.5 {RATIO} (ref 1.2–2.2)
ALP SERPL-CCNC: 36 IU/L (ref 44–121)
ALT SERPL-CCNC: 12 IU/L (ref 0–32)
AST SERPL-CCNC: 14 IU/L (ref 0–40)
BASOPHILS # BLD AUTO: 0 X10E3/UL (ref 0–0.2)
BASOPHILS NFR BLD AUTO: 2 %
BILIRUB SERPL-MCNC: 0.6 MG/DL (ref 0–1.2)
BUN SERPL-MCNC: 8 MG/DL (ref 8–27)
BUN/CREAT SERPL: 10 (ref 12–28)
CALCIUM SERPL-MCNC: 9.3 MG/DL (ref 8.7–10.3)
CHLORIDE SERPL-SCNC: 106 MMOL/L (ref 96–106)
CO2 SERPL-SCNC: 21 MMOL/L (ref 20–29)
CREAT SERPL-MCNC: 0.84 MG/DL (ref 0.57–1)
CRP SERPL-MCNC: <1 MG/L (ref 0–10)
EGFR: 75 ML/MIN/1.73
EOSINOPHIL # BLD AUTO: 0 X10E3/UL (ref 0–0.4)
EOSINOPHIL NFR BLD AUTO: 1 %
ERYTHROCYTE [DISTWIDTH] IN BLOOD BY AUTOMATED COUNT: 13.7 % (ref 11.7–15.4)
ERYTHROCYTE [SEDIMENTATION RATE] IN BLOOD BY WESTERGREN METHOD: 4 MM/HR (ref 0–40)
GLOBULIN SER CALC-MCNC: 3 G/DL (ref 1.5–4.5)
GLUCOSE SERPL-MCNC: 99 MG/DL (ref 65–99)
HCT VFR BLD AUTO: 37 % (ref 34–46.6)
HGB BLD-MCNC: 12.6 G/DL (ref 11.1–15.9)
IMM GRANULOCYTES # BLD AUTO: 0 X10E3/UL (ref 0–0.1)
IMM GRANULOCYTES NFR BLD AUTO: 1 %
LYMPHOCYTES # BLD AUTO: 0.6 X10E3/UL (ref 0.7–3.1)
LYMPHOCYTES NFR BLD AUTO: 29 %
MCH RBC QN AUTO: 34.3 PG (ref 26.6–33)
MCHC RBC AUTO-ENTMCNC: 34.1 G/DL (ref 31.5–35.7)
MCV RBC AUTO: 101 FL (ref 79–97)
MONOCYTES # BLD AUTO: 0.3 X10E3/UL (ref 0.1–0.9)
MONOCYTES NFR BLD AUTO: 14 %
NEUTROPHILS # BLD AUTO: 1.1 X10E3/UL (ref 1.4–7)
NEUTROPHILS NFR BLD AUTO: 53 %
PLATELET # BLD AUTO: 193 X10E3/UL (ref 150–450)
POTASSIUM SERPL-SCNC: 4.7 MMOL/L (ref 3.5–5.2)
PROT SERPL-MCNC: 7.5 G/DL (ref 6–8.5)
RBC # BLD AUTO: 3.67 X10E6/UL (ref 3.77–5.28)
SODIUM SERPL-SCNC: 143 MMOL/L (ref 134–144)
WBC # BLD AUTO: 2 X10E3/UL (ref 3.4–10.8)

## 2022-09-01 NOTE — PROGRESS NOTES
Please let patient know her wbc is a bit lower at 2.0, from 2.2 before. If her joints are doing well, she can decrease further to 0.6mL with her injection.  If she has been having more joint pain since decreasing to 0.8mL, then I would just stay where she is and well keep trending the wbc with each actemra treatment

## 2022-09-02 NOTE — PROGRESS NOTES
Patient notified that WBC is bit lower at 2.0 per Dr. Beatty Ferris. Informed if joints are feeling ok , she can decrease dose to 0.6ml per Dr. Beatty Ferris, but if pain has increased since decreasing to 0.8 ml then keep dose at 0.8 ml per Dr. Beatty Ferris. Pt states she is having pain in her arm and will keep dose at 0.8 ml.

## 2022-09-05 LAB
GAMMA INTERFERON BACKGROUND BLD IA-ACNC: 0.02 IU/ML
M TB IFN-G BLD-IMP: NEGATIVE
M TB IFN-G CD4+ BCKGRND COR BLD-ACNC: 0.02 IU/ML
MITOGEN IGNF BLD-ACNC: 3.68 IU/ML
QUANTIFERON TB2 AG: 0.01 IU/ML
SERVICE CMNT-IMP: NORMAL

## 2022-11-23 ENCOUNTER — OFFICE VISIT (OUTPATIENT)
Dept: RHEUMATOLOGY | Age: 72
End: 2022-11-23
Payer: MEDICARE

## 2022-11-23 VITALS
HEART RATE: 75 BPM | RESPIRATION RATE: 16 BRPM | OXYGEN SATURATION: 96 % | SYSTOLIC BLOOD PRESSURE: 147 MMHG | DIASTOLIC BLOOD PRESSURE: 83 MMHG | TEMPERATURE: 97.9 F | BODY MASS INDEX: 46.65 KG/M2 | WEIGHT: 289 LBS

## 2022-11-23 DIAGNOSIS — M81.8 OTHER OSTEOPOROSIS WITHOUT CURRENT PATHOLOGICAL FRACTURE: ICD-10-CM

## 2022-11-23 DIAGNOSIS — M81.0 AGE-RELATED OSTEOPOROSIS WITHOUT CURRENT PATHOLOGICAL FRACTURE: ICD-10-CM

## 2022-11-23 DIAGNOSIS — M75.81 TENDINITIS OF RIGHT ROTATOR CUFF: ICD-10-CM

## 2022-11-23 DIAGNOSIS — E66.01 MORBID OBESITY WITH BMI OF 45.0-49.9, ADULT (HCC): ICD-10-CM

## 2022-11-23 DIAGNOSIS — M19.012 GLENOHUMERAL ARTHRITIS, LEFT: ICD-10-CM

## 2022-11-23 DIAGNOSIS — M05.79 SEROPOSITIVE RHEUMATOID ARTHRITIS OF MULTIPLE SITES (HCC): Primary | ICD-10-CM

## 2022-11-23 DIAGNOSIS — Z79.60 LONG-TERM USE OF IMMUNOSUPPRESSANT MEDICATION: ICD-10-CM

## 2022-11-23 PROCEDURE — G8510 SCR DEP NEG, NO PLAN REQD: HCPCS | Performed by: INTERNAL MEDICINE

## 2022-11-23 PROCEDURE — 3074F SYST BP LT 130 MM HG: CPT | Performed by: INTERNAL MEDICINE

## 2022-11-23 PROCEDURE — G8427 DOCREV CUR MEDS BY ELIG CLIN: HCPCS | Performed by: INTERNAL MEDICINE

## 2022-11-23 PROCEDURE — 1090F PRES/ABSN URINE INCON ASSESS: CPT | Performed by: INTERNAL MEDICINE

## 2022-11-23 PROCEDURE — 1123F ACP DISCUSS/DSCN MKR DOCD: CPT | Performed by: INTERNAL MEDICINE

## 2022-11-23 PROCEDURE — 99215 OFFICE O/P EST HI 40 MIN: CPT | Performed by: INTERNAL MEDICINE

## 2022-11-23 PROCEDURE — 3078F DIAST BP <80 MM HG: CPT | Performed by: INTERNAL MEDICINE

## 2022-11-23 PROCEDURE — 1101F PT FALLS ASSESS-DOCD LE1/YR: CPT | Performed by: INTERNAL MEDICINE

## 2022-11-23 PROCEDURE — G8417 CALC BMI ABV UP PARAM F/U: HCPCS | Performed by: INTERNAL MEDICINE

## 2022-11-23 PROCEDURE — 3017F COLORECTAL CA SCREEN DOC REV: CPT | Performed by: INTERNAL MEDICINE

## 2022-11-23 PROCEDURE — G8536 NO DOC ELDER MAL SCRN: HCPCS | Performed by: INTERNAL MEDICINE

## 2022-11-23 PROCEDURE — G8754 DIAS BP LESS 90: HCPCS | Performed by: INTERNAL MEDICINE

## 2022-11-23 PROCEDURE — G8753 SYS BP > OR = 140: HCPCS | Performed by: INTERNAL MEDICINE

## 2022-11-23 RX ORDER — TOCILIZUMAB 180 MG/ML
162 INJECTION, SOLUTION SUBCUTANEOUS
Qty: 1.8 ML | Refills: 11 | Status: SHIPPED | OUTPATIENT
Start: 2022-11-23

## 2022-11-23 RX ORDER — HYDROCHLOROTHIAZIDE 25 MG/1
TABLET ORAL
COMMUNITY
Start: 2022-10-05

## 2022-11-23 NOTE — PROGRESS NOTES
Chief Complaint   Patient presents with    Joint Pain      1. Have you been to the ER, urgent care clinic since your last visit? Hospitalized since your last visit? No    2. Have you seen or consulted any other health care providers outside of the [address] [place] [address] since your last visit? Include any pap smears or colon screening.  No

## 2022-11-23 NOTE — PATIENT INSTRUCTIONS
I'm referring you to physical therapy for your shoulder, you can take this to any PT facility that is convenient for you and accepted by your insurance. Space out Actemra injections to every other week. If you experience more pain and swelling with that, then go back to weekly injections and let me know. Continue methotrexate 0.8mL (20mg) once a week. Continue daily folic acid. Repeat labs within the next month. Bone density scan at your earliest convenience. Do this the same place as your last one so we can make more accurate assessment of your progress with the Prolia. Call [phone] to schedule. Return in 2-3 months. At that point we'll try switching you to pill methotrexate if you're still doing well.

## 2022-11-23 NOTE — PROGRESS NOTES
REASON FOR VISIT    This is a follow up visit for Ms. Catrachita Yan for    ICD-10-CM   1.  Seropositive rheumatoid arthritis of multiple sites ([facility] M05.79     Inflammatory arthritis phenotype includes:  Anti-CCP positive: yes (21)  Rheumatoid factor positive: yes (94.8)  Erosive disease: yes  Extra-articular manifestations include: none    Immunosuppression Screening (10/15/2020):  Quantiferon TB: negative  PPD: negative (2016)  PPD: negative (8/16/2017)  Hepatitis B: negative  Hepatitis C: negative     Therapy History includes:  Current DMARD therapy include: methotrexate 25->20 mg subcutaneously every Monday, Actemra 162 mg every Sunday (11/2018 to 3/2019, 9/16/2019 to present)   Prior DMARD therapy include: gold (one year), methotrexate 20 mg (PO), Humira 40 mg every 14 days (10/31/2017), Enbrel every Monday (2/2018 to 6/11/2018), Forestine Sovereign 11 mg XR (6/18/2018 to 9/17/2018),  Kevzara 200 mg every 14 days (3/19/2019 to 6/17/2019)  Discontinued DMARDs because of inefficacy: gold, Enbrel, Xeljanz 11 mg XR, Kevzara  Discontinued DMARDs because of side effects: Humira (rash), Kevzara (hives)    Bone Density Historical Synopsis    Height loss since age 29 (at least two inches): 0  Fracture history includes: no  Family history of hip fracture: no  Fall Risk: no    Daily calcium intake is 0 mg  Weekly vitamin D intake is 50,000 IU    Smoking history: no  Alcohol consumption: no  Prednisone history: yes    Exercise: no    Previous work-up for osteoporosis includes the following:  DEXA Scan: 6/01/2020  Vitamin 25OH D level: 38.2 (10/15/2020)  PTH: 32 (10/15/2020)  TSH: 1.60 (10/15/2020)    Therapy History includes:  Current osteoporosis therapy includes: Prolia 60 mg every 6 months (7/20/2020 to present)  Prior osteoporosis therapy includes: none  The following osteoporosis therapy have been ineffective: none  The following osteoporosis therapy were stopped because of side effects: none    HISTORY OF PRESENT ILLNESS    Ms. Kendy Haas returns for a follow up visit. Last seen 7/20/2022, at which time we had decreased methotrexate to 20mg subQ weekly for well-controlled arthritis and leukopenia. Has remained on Actemra weekly. Doesn't believe she has ever decreased her dose of methotrexate below 0.8mL (20mg). Having more left arm pain, has difficulty combing her hair. Left shoulder and left hand pain. In the past she has taken prednisone as needed for flares like this, though she feels ambivalent about falling back into this pattern of taking steroids for pain. Hasn't been to physical therapy since she had them work on her low back over a year ago. Takes tylenol 1000mg BID for pain. No longer takes tramadol. Concerned that one of her doctors told her she had chronic kidney disease. Last Cr was 0.84 on 8/31. No fevers or chills. No interval infections or antibiotics. Prolia shots going well, denies post-dosing bone pain or muscle cramping    REVIEW OF SYSTEMS    A comprehensive review of systems was performed and pertinent results are documented in the HPI, review of systems is otherwise non-contributory. PAST MEDICAL HISTORY    She has a past medical history of Anemia, Fibromyalgia, Hypertension, Osteoarthritis, Osteoporosis, Pulmonary embolism ([address] and Rheumatoid arthritis ([address] FAMILY HISTORY    Her family history includes Arthritis-rheumatoid in her mother; Cancer in her father; Diabetes in her maternal grandmother and son; Heart Attack in her paternal grandmother; Schizophrenia in her son. SOCIAL HISTORY    She reports that she has never smoked. She has never used smokeless tobacco. She reports that she does not drink alcohol and does not use drugs. MEDICATIONS    Current Outpatient Medications   Medication Sig    hydroCHLOROthiazide (HYDRODIURIL) 25 mg tablet     ergocalciferol (ERGOCALCIFEROL) 1,250 mcg (50,000 unit) capsule Take 1 Capsule by mouth every seven (7) days.     methotrexate, PF, 25 mg/mL injection 0.8 mL by SubCUTAneous route every seven (7) days. folic acid (FOLVITE) 1 mg tablet Take 1 Tablet by mouth in the morning. Insulin Syringe-Needle U-100 (Droplet Insulin Syringe) 1 mL 30 gauge x 5/16 syrg USE TO INJECT METHOTREXATE ONE TIME WEEKLY    tocilizumab (Actemra) 162 mg/0.9 mL syringe 0.9 mL by SubCUTAneous route every seven (7) days. rivaroxaban (XARELTO) 10 mg tablet Take  by mouth daily. denosumab (Prolia) 60 mg/mL injection 60 mg by SubCUTAneous route every 6 months. acetaminophen (TYLENOL) 650 mg TbER Take 650 mg by mouth as needed. IRON, FERROUS SULFATE, PO Take 65 mg by mouth daily. multivitamin (ONE A DAY) tablet Take 1 Tab by mouth daily. metoprolol tartrate (LOPRESSOR) 50 mg tablet two (2) times a day. No current facility-administered medications for this visit. ALLERGIES    Allergies   Allergen Reactions    Clindamycin Swelling and Other (comments)     fever    Humira [Adalimumab] Hives       PHYSICAL EXAMINATION    Visit Vitals  BP (!) 147/83 (BP 1 Location: Left upper arm, BP Patient Position: Sitting, BP Cuff Size: Large adult)   Pulse 75   Temp 97.9 °F (36.6 °C) (Oral)   Resp 16   Wt 289 lb (131.1 kg)   SpO2 96%   BMI 46.65 kg/m²       Body mass index is 46.65 kg/m². General:  The patient is well developed, well nourished, alert, and in no apparent distress. Eyes: Sclera are anicteric. No conjunctival injection. HEENT:  Oropharynx is clear. No oral ulcers. Adequate salivary pooling. No cervical or supraclavicular lymphadenopathy. Lungs:  Clear to auscultation bilaterally, without wheeze or stridor. Normal respiratory effort. Cor:  Regular rate and rhythm. No murmurs rubs or gallops. Abdomen: Soft, non-tender, without hepatomegaly or masses. Extremities: No calf tenderness or edema. Warm and well perfused. Skin:  No significant abnormalities.    Neuro: Nonfocal  Musculoskeletal:    A comprehensive musculoskeletal exam was performed for all joints of each upper and lower extremity and assessed for swelling, tenderness and range of motion. Results are documented as below:  Bilateral shoulder crepitus, interval worsened left shoulder pain with positive Neer and empty can. Stably decreased flexion/extension of wrists  Soft tissue prominence of left > right MCPs without synovitis or tenderness  Left elbow stably limited to ~150deg extension  Right ankle limited subtalar ROM without warmth or effusion  No evidence of synovitis in the small joints of the hands, wrists, shoulders, elbows, hips, knees or ankles. DATA REVIEW    Laboratory     Recent laboratory results were reviewed, summarized, and discussed with the patient. 8/31/22: QFN neg; ESR 4, Cr 0.84, LFTs WNL, WBC 2.0 (ANC 1.1, ), Hgb 12.6, Plt 193, CRP <1mg/L  7/12/22: Cr 0.89   6/2/22: ESR 19, Cr 0.9, AST 17, ALT 13, Alk phos 38, Tbili 0.8, WBC 2.2, HGB 12.1, Plt 172, CRP <1 mg/L  3/18/22: QuaniFERON plus indeterminate, ESR 3, Cr 0.97, ALT 14, AST 18, Alk phos 32, tbili 1, WBC 3, HGB 12.6, Plt 176, CRP <1 mg/L  1/13/22: Cr 0.86    Imaging    Musculoskeletal Ultrasound    None    Radiographs    XR HAND RT MIN 3 V, XR HAND LT MIN 3 V (3/23/22): Inactive bilateral hands rheumatoid arthritis    Chest 3/15/2018: clear lungs. The cardiac and mediastinal contours and pulmonary vascularity are normal.  The bones and soft tissues are within normal limits. Bilateral Hand 7/31/2017: RIGHT: no fracture. There is diffuse osteopenia. Both corticated and non corticated erosive changes are present involving the radiocarpal joint, carpal joints, and metacarpal carpal joints. In addition, there is involvement of the first and second metacarpal phalangeal joints, most pronounced in the first. The lunate and radius appear ankylosed. LEFT:  no fracture. There is diffuse osteopenia.  Both corticated and non corticated erosive changes are present involving the radiocarpal joint, carpal joints, and metacarpal carpal joints. The lunate and radius appear ankylosed. Some erosive changes are also present in the third PIP joint. The scaphoid lunate distance is widened. Left Elbow 7/31/2017: marked flattening of the radial head with sclerosis. In erosive changes also present of the proximal ulna. A moderate joint effusion is seen. Use osteopenia is noted. Bilateral Foot 7/31/2017: RIGHT: diffuse osteopenia. Non corticated erosions are present involving the second, fourth, and fifth MTP joints. Associated soft tissue swelling is present. The there may be an ankylosis of the fourth and third metatarsals to the midfoot. Flattening of the second metatarsal head is noted. There is a small Achilles and moderate the plantar spur. No fracture is seen. LEFT: diffuse osteopenia. Erosive changes are present at the second and third metatarsal tarsal joints. A small spur is noted at the Achilles insertion. No fractures identified. Less forefoot soft tissue swelling is present. No fracture is seen    Chest 1/30/2017: lungs remain clear. No pneumothorax or pleural effusion apparent. Cardiac silhouette remains large. Endotracheal tube and nasogastric tube have been removed. Left central line stable in position with tip projecting over the superior vena cava. Right Hip 4/26/2016: no fracture or dislocation. The right hip joint spaces normal and symmetric with the left side. Enthesophytes are seen along either iliac crest and there is relatively severe bilateral facet hypertrophy at L5-S1. The sacroiliac joints appear grossly normal.     Right Femur 4/26/2016: the patient is status post prior placement of a 3 component right total knee prosthesis. From this examination a full assessment of the prosthesis is limited. Grossly this examination is negative for a loosening of the prosthetic components, heterotopic ossification, or additional complications of the prosthesis.  The joint spaces of the right hip are well maintained without significant osteoarthritis. This examination is negative for a fracture or an insufficiency fracture of the proximal femur. This examination examination is negative for focal lytic or blastic lesions of the right femur. CT Imaging    CT Abdomen and Pelvis without contrast 7/31/2017:there are linear densities at both lung bases suggesting scarring or subsegmental atelectasis. The lung bases are otherwise clear no pleural effusion is seen. The liver spleen and pancreas are unremarkable. No renal stone or mass is seen. No ureteral dilatation or stone is seen. The urinary bladder is unremarkable. A urinary catheter is noted with tip within the urinary bladder. Small calcifications within the uterus are again noted. The uterus and pelvic adnexa are otherwise unremarkable. No dilated bowel or free air or free fluid is seen. Specifically there is no evidence of retroperitoneal or intra-abdominal hemorrhage. An inferior vena cava filter is noted. MR Imaging    MRI Lumbar Spine with and without contrast 4/26/2016: study is suboptimal secondary to patient's body habitus. T12-L1: Normal for age. L1-L2: Normal for age. L2-L3: There is mild facet joint arthropathy. L3-L4: There is mild disc disease with concentric bulge. Moderate to severe facet arthropathy is present with bony hypertrophy and ligamentous thickening. There is moderate central canal stenosis and lateral recess narrowing. Bilateral foraminal narrowing is present. There is absence of fat within the right neural foramen suggesting involvement of the exiting L3 nerve root. L4-L5: There is mild disc disease with loss of disc height. Moderate to severe facet arthropathy is present with bony hypertrophy and ligamentous thickening. There is moderate central canal stenosis primarily in a transverse direction. Severe lateral recess narrowing is present with moderate bilateral foraminal narrowing.  Absence of fat within the right neural foramen suggests involvement of the exiting right L4 nerve root. L5-S1: There is mild disc degeneration with loss of disc height. Moderate to severe facet arthropathy is present with bony hypertrophy and ligamentous thickening. No significant canal stenosis. There is mild lateral recess narrowing. Bilateral neural foraminal narrowing is also present, right greater than left. Absence of fat within the right neural foramen suggests involvement of the exiting right L5 nerve root. Visualized portions of the sacrum are normal. There is no abnormal enhancement. The conus is normal in contour and signal characteristics. Paraspinal soft tissues are unremarkable. DXA     DXA 6/01/2020: (excluded None) lumbar spine L1-L4 T score 1.7 (BMD 1.402 g/cm2), left femoral neck T score: -0.6 (0.864 g/cm2), left total hip T score: 0.2 (1.027 g/cm2), right femoral neck T score: -1.3 (0.864 g/cm2), right total hip T score: -0.7 (0.921 g/cm2), and distal one third left radius T score -3.3 (BMD 0.588 g/cm2). FRAX score 6.6 % probability in 10 years for major osteoporotic fracture and 0.7 % 10 year probability of hip fracture. DXA 8/28/2017: (excluded None) showed lumbar spine L1-L4 T score 1.2 (BMD 1.345 g/cm2), left femoral neck T score -0.9 (0.919 g/cm2), left total hip T score: 0.1 (1.020 g/cm2), right femoral neck T score: -0.4 (0.977 g/cm2), right total hip T score: -0.6 (0.938 g/cm2), and distal one third left radius T score N/A (BMD N/A g/cm2). FRAX score 5.8 % probability in 10 years for major osteoporotic fracture and 0.4 % 10 year probability of hip fracture. ASSESSMENT AND PLAN    This is a follow up visit for Ms. Dolores Chen for seropositive erosive rheumatoid arthritis, with still low disease activity on methotrexate and weekly Actemra. Reducing Actemra injections to every other week, continuing methotrexate 20mg weekly. 1. Glenohumeral arthritis, left  - REFERRAL TO PHYSICAL THERAPY    2.  Tendinitis of right rotator cuff  - REFERRAL TO PHYSICAL THERAPY    3. Morbid obesity with BMI of 45.0-49.9, adult ([facility]  -Reviewed the fourfold reduction in knee forces for every unit of weight lost (Arthritis Rheum. 2005 Jul;52(7):2026-32)     4. Seropositive rheumatoid arthritis of multiple sites (HCC)  - Cont methotrexate 20mg subQ weekly  - C REACTIVE PROTEIN, QT; Future  - CBC WITH AUTOMATED DIFF; Future  - METABOLIC PANEL, COMPREHENSIVE; Future  - SED RATE (ESR); Future  - tocilizumab (Actemra) 162 mg/0.9 mL syringe; 0.9 mL by SubCUTAneous route every fourteen (14) days. Dispense: 1.8 mL; Refill: 11    5. Long-term use of immunosuppressant medication  - CBC WITH AUTOMATED DIFF; Future  - METABOLIC PANEL, COMPREHENSIVE; Future    6. Other osteoporosis without current pathological fracture  - Cont Prolia injections q6mo, due next 1/23  - DEXA BONE DENSITY STUDY AXIAL; Future      Patient Instructions   I'm referring you to physical therapy for your shoulder, you can take this to any PT facility that is convenient for you and accepted by your insurance. Space out Actemra injections to every other week. If you experience more pain and swelling with that, then go back to weekly injections and let me know. Continue methotrexate 0.8mL (20mg) once a week. Continue daily folic acid. Repeat labs within the next month. Bone density scan at your earliest convenience. Do this the same place as your last one so we can make more accurate assessment of your progress with the Prolia. Call [phone] to schedule. Return in 2-3 months. At that point we'll try switching you to pill methotrexate if you're still doing well.       TODAY'S ORDERS    Orders Placed This Encounter    DEXA BONE DENSITY STUDY AXIAL    C REACTIVE PROTEIN, QT    CBC WITH AUTOMATED DIFF    METABOLIC PANEL, COMPREHENSIVE    SED RATE (ESR)    REFERRAL TO PHYSICAL THERAPY    hydroCHLOROthiazide (HYDRODIURIL) 25 mg tablet    tocilizumab (Actemra) 162 mg/0.9 mL syringe       Future Appointments   Date Time Provider Constance Allani   12/28/2022  1:00 PM LAB ONLY PA[facility] BS AMB   1/18/2023  1:00 PM INFUSIONINJ_RC [facility] BS AMB     Face to face time: 30 minutes  Note preparation and records review day of service: 15minutes  Total provider time day of service: 45 minutes    Mini Alves MD [facility]    Adult Rheumatology   [facility]  A Part of [facility], [address] [place] [address] Road   Phone [phone]  Fax [phone]

## 2022-11-28 NOTE — LETTER
8/28/2017 3:57 PM 
 
Ms. Madison Felder [address] [id number] Dear Madison Felder: 
 
Please find your most recent results below. Resulted Orders DEXA BONE DENSITY STUDY AXIAL Narrative Bone Mineral Density Indication:  postmenopausal 
Age: 61 Sex: Female. Menopause status: postmenopausal. 
Hormone replacement therapy: None Number of falls in the past year:   None. Risk factors for osteoporosis:  Steroid use, rheumatoid arthritis, diuretic use Current medication for osteoporosis: Vitamin D. Comparison: None. Technique: Imaging was performed on the [address] [place] [address] 
meta-analysis fracture risk calculator (FRAX) analysis was performed for 10 year 
fracture risk probability assessment Excluded sites: 0 Findings: 
  
Femoral Neck:  Left Bone mineral density (gm/cm2):? 0.919 
% of peak bone mass: 89 
% for age matched controls:? 86 
T-score: -0.9 Z-score: -1.1 Total Hip: Right Bone mineral density (gm/cm2):  0.938 
% of peak bone mass:   93 
% for age matched controls:  80 
T-score:   -0.6 Z-score:  -1.2 Lumbar Spine:  L1-4 Bone mineral density (gm/cm2):  1.345 
% of peak bone mass:  112  
% for age matched controls:  80 T-score:  1.2 Z-score:  0.9 Impression Impression: This patient is normal using the World Health Organization criteria 10 year probability of major osteoporotic fracture:  5.8% 10 year probability of hip fracture:  0.4% Recommendations: 
Therapy recommendations need to be tailored to each individual patient. Using 
the [address]) FRAX absolute fracture algorithm, the 
[address] [place] [address] recommends beginning pharmacological therapy in 
postmenopausal women and men over the age of 48 with a 8 year probability of a 
hip fracture of >3% OR with the 10 year probability of a major osteoporotic 
fracture of >20%. Please reconsider testing based on risk factors. Currently, Medicare will only 
reimburse for a central DXA examination every two years, unless the patient is 
on chronic glucocorticoid therapy. Note: Please note that reliable, valid comparisons cannot be made between 
studies which have been performed on machines from different manufacturers. If 
clinically warranted, a follow up study performed at this site, on the same 
unit, would allow the most sensitive assessment of change in bone mineral 
density. RECOMMENDATIONS: 
 
None. DEXA scan is normal 
 
Please call me if you have any questions: [phone] Sincerely, [facility] DEXA 1 to car/Wheelchair/Stroller

## 2023-01-11 ENCOUNTER — HOSPITAL ENCOUNTER (OUTPATIENT)
Dept: MAMMOGRAPHY | Age: 73
Discharge: HOME | End: 2023-01-11
Attending: INTERNAL MEDICINE
Payer: MEDICARE

## 2023-01-11 DIAGNOSIS — M81.8 OTHER OSTEOPOROSIS WITHOUT CURRENT PATHOLOGICAL FRACTURE: ICD-10-CM

## 2023-01-11 PROCEDURE — 77080 DXA BONE DENSITY AXIAL: CPT

## 2023-01-16 NOTE — PROGRESS NOTES
[facility] Rheumatology  OBIC Note    Date: 2023  Name: Malcolm Chowdhury  MRN: [id number]       : 1950  Diagnosis: Osteoporosis (M81.0)  Treatment: Prolia  Referring Provider: Dr. Dru Arshad  Supervising Provider: Dr. Dru Arshad    Patient arrived to [facility] at 1300. Ms. Terence Chicas allergies reviewed and she agreed to receiving today's treatment. Visit Vitals  BP (!) 142/77 (BP 1 Location: Right arm)   Pulse 83   Temp 97 °F (36.1 °C)   Resp 16   SpO2 95%      Recent labs results:  Lab Results   Component Value Date/Time    Sodium 139 2022 11:55 PM    Potassium 3.8 2022 11:55 PM    Chloride 105 2022 11:55 PM    CO2 29 2022 11:55 PM    Anion gap 5 2022 11:55 PM    Glucose 100 2022 11:55 PM    BUN 14 2022 11:55 PM    Creatinine 0.90 2022 11:55 PM    BUN/Creatinine ratio 16 2022 11:55 PM    GFR est AA >60 2022 02:22 PM    GFR est non-AA >60 2022 02:22 PM    Calcium 10.1 2022 11:55 PM    Bilirubin, total 1.0 2022 11:55 PM    Alk. phosphatase 37 (L) 2022 11:55 PM    Protein, total 7.5 2022 11:55 PM    Albumin 4.2 2022 11:55 PM    Globulin 3.3 2022 11:55 PM    A-G Ratio 1.3 2022 11:55 PM    ALT (SGPT) 15 2022 11:55 PM    AST (SGOT) 22 2022 11:55 PM      Medications given per providers orders:  Administrations This Visit       denosumab (PROLIA) injection 60 mg       Admin Date  2023 Action  Given Dose  60 mg Route  SubCUTAneous Administered By  Simone Curry RN                    Prolia to right arm  [address] [id number]  Lot # [id number]  Exp 2025    Ms. Peterson tolerated the treatment without complaints and no medication reactions were seen while in presence of this RN. Patient provided with AVS, which included future appointments and written educational material regarding Prolia.   All of the patients questions were answered and then discharged ambulatory from Rheumatology [facility] in stable condition at 1320. Encounter routed to supervising provider for co-signing.      Future Appointments   Date Time Provider Constance Lois   1/18/2023  1:40 PM Kit Amador MD AO[facility] BS AMB   7/19/2023  1:00 PM FRANCIE AO[facility] BS AMB     Deidre Asencio RN  January 18, 2023

## 2023-01-18 ENCOUNTER — OFFICE VISIT (OUTPATIENT)
Dept: RHEUMATOLOGY | Age: 73
End: 2023-01-18

## 2023-01-18 ENCOUNTER — OFFICE VISIT (OUTPATIENT)
Dept: RHEUMATOLOGY | Age: 73
End: 2023-01-18
Payer: MEDICARE

## 2023-01-18 VITALS
RESPIRATION RATE: 16 BRPM | DIASTOLIC BLOOD PRESSURE: 77 MMHG | HEART RATE: 83 BPM | OXYGEN SATURATION: 95 % | TEMPERATURE: 97 F | SYSTOLIC BLOOD PRESSURE: 142 MMHG

## 2023-01-18 DIAGNOSIS — M81.0 AGE-RELATED OSTEOPOROSIS WITHOUT CURRENT PATHOLOGICAL FRACTURE: ICD-10-CM

## 2023-01-18 DIAGNOSIS — M05.79 SEROPOSITIVE RHEUMATOID ARTHRITIS OF MULTIPLE SITES (HCC): Primary | ICD-10-CM

## 2023-01-18 DIAGNOSIS — Z79.60 LONG-TERM USE OF IMMUNOSUPPRESSANT MEDICATION: ICD-10-CM

## 2023-01-18 DIAGNOSIS — M81.0 AGE-RELATED OSTEOPOROSIS WITHOUT CURRENT PATHOLOGICAL FRACTURE: Primary | ICD-10-CM

## 2023-01-18 PROCEDURE — G8417 CALC BMI ABV UP PARAM F/U: HCPCS | Performed by: INTERNAL MEDICINE

## 2023-01-18 PROCEDURE — 1090F PRES/ABSN URINE INCON ASSESS: CPT | Performed by: INTERNAL MEDICINE

## 2023-01-18 PROCEDURE — G8432 DEP SCR NOT DOC, RNG: HCPCS | Performed by: INTERNAL MEDICINE

## 2023-01-18 PROCEDURE — 1101F PT FALLS ASSESS-DOCD LE1/YR: CPT | Performed by: INTERNAL MEDICINE

## 2023-01-18 PROCEDURE — 3017F COLORECTAL CA SCREEN DOC REV: CPT | Performed by: INTERNAL MEDICINE

## 2023-01-18 PROCEDURE — 1123F ACP DISCUSS/DSCN MKR DOCD: CPT | Performed by: INTERNAL MEDICINE

## 2023-01-18 PROCEDURE — G8427 DOCREV CUR MEDS BY ELIG CLIN: HCPCS | Performed by: INTERNAL MEDICINE

## 2023-01-18 PROCEDURE — 99214 OFFICE O/P EST MOD 30 MIN: CPT | Performed by: INTERNAL MEDICINE

## 2023-01-18 PROCEDURE — 96372 THER/PROPH/DIAG INJ SC/IM: CPT

## 2023-01-18 PROCEDURE — G8536 NO DOC ELDER MAL SCRN: HCPCS | Performed by: INTERNAL MEDICINE

## 2023-01-18 RX ORDER — DIPHENHYDRAMINE HYDROCHLORIDE 50 MG/ML
50 INJECTION, SOLUTION INTRAMUSCULAR; INTRAVENOUS AS NEEDED
Start: 2023-07-16

## 2023-01-18 RX ORDER — ACETAMINOPHEN 325 MG/1
650 TABLET ORAL AS NEEDED
Start: 2023-07-16

## 2023-01-18 RX ORDER — ONDANSETRON 2 MG/ML
8 INJECTION INTRAMUSCULAR; INTRAVENOUS AS NEEDED
OUTPATIENT
Start: 2023-07-16

## 2023-01-18 RX ORDER — HYDROCORTISONE SODIUM SUCCINATE 100 MG/2ML
100 INJECTION, POWDER, FOR SOLUTION INTRAMUSCULAR; INTRAVENOUS AS NEEDED
OUTPATIENT
Start: 2023-07-16

## 2023-01-18 RX ORDER — EPINEPHRINE 1 MG/ML
0.3 INJECTION, SOLUTION, CONCENTRATE INTRAVENOUS AS NEEDED
OUTPATIENT
Start: 2023-07-16

## 2023-01-18 RX ORDER — ALBUTEROL SULFATE 0.83 MG/ML
2.5 SOLUTION RESPIRATORY (INHALATION) AS NEEDED
Start: 2023-07-16

## 2023-01-18 RX ORDER — DIPHENHYDRAMINE HYDROCHLORIDE 50 MG/ML
25 INJECTION, SOLUTION INTRAMUSCULAR; INTRAVENOUS AS NEEDED
Start: 2023-07-16

## 2023-01-18 NOTE — PATIENT INSTRUCTIONS
1) Continue to take 0.8mL of sub cutaneous Methotrexate once per week with daily folic acid. 2) Continue to take your Actemra injection every other week. 3) You can take 650mg of Tylenol up to 3 times a day for joint pain. 4) Check labs in 3 months and then again before your next Prolia shot. 5) Follow up in 6 months when you get your next Prolia shot. Let me know if you have any questions or concerns in the meantime.

## 2023-01-18 NOTE — PROGRESS NOTES
REASON FOR VISIT    This is a follow up visit for Ms. Ivan Goyal for    ICD-10-CM   1.  Seropositive rheumatoid arthritis of multiple sites ([facility] M05.79     Inflammatory arthritis phenotype includes:  Anti-CCP positive: yes (21)  Rheumatoid factor positive: yes (94.8)  Erosive disease: yes  Extra-articular manifestations include: none    Immunosuppression Screening (10/15/2020):  Quantiferon TB: negative  PPD: negative (2016)  PPD: negative (8/16/2017)  Hepatitis B: negative  Hepatitis C: negative     Therapy History includes:  Current DMARD therapy include: methotrexate 25->20 mg subcutaneously every Monday, Actemra 162 mg every Sunday (11/2018 to 3/2019, 9/16/2019 to present)   Prior DMARD therapy include: gold (one year), methotrexate 20 mg (PO), Humira 40 mg every 14 days (10/31/2017), Enbrel every Monday (2/2018 to 6/11/2018), Eve Single 11 mg XR (6/18/2018 to 9/17/2018),  Kevzara 200 mg every 14 days (3/19/2019 to 6/17/2019)  Discontinued DMARDs because of inefficacy: gold, Enbrel, Xeljanz 11 mg XR, Kevzara  Discontinued DMARDs because of side effects: Humira (rash), Kevzara (hives)    Bone Density Historical Synopsis    Height loss since age 29 (at least two inches): 0  Fracture history includes: no  Family history of hip fracture: no  Fall Risk: no    Daily calcium intake is 0 mg  Weekly vitamin D intake is 50,000 IU    Smoking history: no  Alcohol consumption: no  Prednisone history: yes    Exercise: no    Previous work-up for osteoporosis includes the following:  DEXA Scan: 6/01/2020  Vitamin 25OH D level: 38.2 (10/15/2020)  PTH: 32 (10/15/2020)  TSH: 1.60 (10/15/2020)    Therapy History includes:  Current osteoporosis therapy includes: Prolia 60 mg every 6 months (7/20/2020 to present)  Prior osteoporosis therapy includes: none  The following osteoporosis therapy have been ineffective: none  The following osteoporosis therapy were stopped because of side effects: none    HISTORY OF PRESENT ILLNESS    Ms. Jennifer Moore returns for a follow up visit. Last seen 11/23/2022. She uses a rollator. Pt gets Prolia shots every 6 months. Pt now takes Actemra injections every other week. She says that she does not have more joint pain since spreading her injections out. She also takes 20mg of subQ methotrexate every Sunday with daily folic acid. She denies infections. Pt denies joint swelling or any trouble joints in terms of pain today. Pt says that she is able to walk a good amount with her rollator. She denies falls. Pt has been to physical therapy for back arthritis in the past.      Pt denies new rashes. She recalls that she was told she has hives a long time ago. She uses Benadryl before bed to keep from scratching. Pt lives by herself. Her daughter lives in the area and she is easily reachable. REVIEW OF SYSTEMS    A comprehensive review of systems was performed and pertinent results are documented in the HPI, review of systems is otherwise non-contributory. PAST MEDICAL HISTORY    She has a past medical history of Anemia, Fibromyalgia, Hypertension, Osteoarthritis, Osteoporosis, Pulmonary embolism ([facility] and Rheumatoid arthritis ([facility] FAMILY HISTORY    Her family history includes Arthritis-rheumatoid in her mother; Cancer in her father; Diabetes in her maternal grandmother and son; Heart Attack in her paternal grandmother; Schizophrenia in her son. SOCIAL HISTORY    She reports that she has never smoked. She has never used smokeless tobacco. She reports that she does not drink alcohol and does not use drugs. MEDICATIONS    Current Outpatient Medications   Medication Sig    hydroCHLOROthiazide (HYDRODIURIL) 25 mg tablet     tocilizumab (Actemra) 162 mg/0.9 mL syringe 0.9 mL by SubCUTAneous route every fourteen (14) days. ergocalciferol (ERGOCALCIFEROL) 1,250 mcg (50,000 unit) capsule Take 1 Capsule by mouth every seven (7) days.     methotrexate, PF, 25 mg/mL injection 0.8 mL by SubCUTAneous route every seven (7) days. folic acid (FOLVITE) 1 mg tablet Take 1 Tablet by mouth in the morning. Insulin Syringe-Needle U-100 (Droplet Insulin Syringe) 1 mL 30 gauge x 5/16 syrg USE TO INJECT METHOTREXATE ONE TIME WEEKLY    rivaroxaban (XARELTO) 10 mg tablet Take  by mouth daily. denosumab (Prolia) 60 mg/mL injection 60 mg by SubCUTAneous route every 6 months. acetaminophen (TYLENOL) 650 mg TbER Take 650 mg by mouth as needed. IRON, FERROUS SULFATE, PO Take 65 mg by mouth daily. multivitamin (ONE A DAY) tablet Take 1 Tab by mouth daily. metoprolol tartrate (LOPRESSOR) 50 mg tablet two (2) times a day. No current facility-administered medications for this visit. ALLERGIES    Allergies   Allergen Reactions    Clindamycin Swelling and Other (comments)     fever    Humira [Adalimumab] Hives       PHYSICAL EXAMINATION    There were no vitals taken for this visit. There is no height or weight on file to calculate BMI. General:  The patient is well developed, well nourished, alert, and in no apparent distress. Eyes: Sclera are anicteric. No conjunctival injection. HEENT:  Oropharynx is clear. No oral ulcers. Adequate salivary pooling. No cervical or supraclavicular lymphadenopathy. Lungs:  Clear to auscultation bilaterally, without wheeze or stridor. Normal respiratory effort. Cor:  Regular rate and rhythm. No murmur rub or gallop. Abdomen: Soft, non-tender, without hepatomegaly or masses. Extremities: No calf tenderness or edema. Warm and well perfused. Skin:  No significant abnormalities. Neuro: Nonfocal  Musculoskeletal:    A comprehensive musculoskeletal exam was performed for all joints of each upper and lower extremity and assessed for swelling, tenderness and range of motion. Results are documented as below:  No evidence of synovitis in the small joints of the hands, wrists, shoulders, elbows, hips, knees or ankles.     ___  General:  The patient is well developed, well nourished, alert, and in no apparent distress. Eyes: Sclera are anicteric. No conjunctival injection. HEENT:  Oropharynx is clear. No oral ulcers. Adequate salivary pooling. No cervical or supraclavicular lymphadenopathy. Lungs:  Clear to auscultation bilaterally, without wheeze or stridor. Normal respiratory effort. Cor:  Regular rate and rhythm. No murmurs rubs or gallops. Abdomen: Soft, non-tender, without hepatomegaly or masses. Extremities: No calf tenderness or edema. Warm and well perfused. Skin:  No significant abnormalities. Neuro: Nonfocal  Musculoskeletal:    A comprehensive musculoskeletal exam was performed for all joints of each upper and lower extremity and assessed for swelling, tenderness and range of motion. Results are documented as below:  Bilateral shoulder crepitus, interval worsened left shoulder pain with positive Neer and empty can. Stably decreased flexion/extension of wrists  Soft tissue prominence of left > right MCPs without synovitis or tenderness  Left elbow stably limited to ~150deg extension  Right ankle limited subtalar ROM without warmth or effusion  No evidence of synovitis in the small joints of the hands, wrists, shoulders, elbows, hips, knees or ankles. DATA REVIEW    Laboratory     Recent laboratory results were reviewed, summarized, and discussed with the patient.   12/28/22: ESR 28, Cr 0.9, LFT WNL, WBC 2.7, HGB 12, Plt 189, CRP <0.29 mg/dL  8/31/22: QFN neg; ESR 4, Cr 0.84, LFTs WNL, WBC 2.0 (ANC 1.1, ), Hgb 12.6, Plt 193, CRP <1mg/L  7/12/22: Cr 0.89   6/2/22: ESR 19, Cr 0.9, AST 17, ALT 13, Alk phos 38, Tbili 0.8, WBC 2.2, HGB 12.1, Plt 172, CRP <1 mg/L  3/18/22: QuaniFERON plus indeterminate, ESR 3, Cr 0.97, ALT 14, AST 18, Alk phos 32, tbili 1, WBC 3, HGB 12.6, Plt 176, CRP <1 mg/L  1/13/22: Cr 0.86    Imaging    Musculoskeletal Ultrasound    None    Radiographs    XR HAND RT MIN 3 V, XR HAND LT MIN 3 V (3/23/22): Inactive bilateral hands rheumatoid arthritis    Chest 3/15/2018: clear lungs. The cardiac and mediastinal contours and pulmonary vascularity are normal.  The bones and soft tissues are within normal limits. Bilateral Hand 7/31/2017: RIGHT: no fracture. There is diffuse osteopenia. Both corticated and non corticated erosive changes are present involving the radiocarpal joint, carpal joints, and metacarpal carpal joints. In addition, there is involvement of the first and second metacarpal phalangeal joints, most pronounced in the first. The lunate and radius appear ankylosed. LEFT:  no fracture. There is diffuse osteopenia. Both corticated and non corticated erosive changes are present involving the radiocarpal joint, carpal joints, and metacarpal carpal joints. The lunate and radius appear ankylosed. Some erosive changes are also present in the third PIP joint. The scaphoid lunate distance is widened. Left Elbow 7/31/2017: marked flattening of the radial head with sclerosis. In erosive changes also present of the proximal ulna. A moderate joint effusion is seen. Use osteopenia is noted. Bilateral Foot 7/31/2017: RIGHT: diffuse osteopenia. Non corticated erosions are present involving the second, fourth, and fifth MTP joints. Associated soft tissue swelling is present. The there may be an ankylosis of the fourth and third metatarsals to the midfoot. Flattening of the second metatarsal head is noted. There is a small Achilles and moderate the plantar spur. No fracture is seen. LEFT: diffuse osteopenia. Erosive changes are present at the second and third metatarsal tarsal joints. A small spur is noted at the Achilles insertion. No fractures identified. Less forefoot soft tissue swelling is present. No fracture is seen    Chest 1/30/2017: lungs remain clear. No pneumothorax or pleural effusion apparent. Cardiac silhouette remains large. Endotracheal tube and nasogastric tube have been removed.  Left central line stable in position with tip projecting over the superior vena cava. Right Hip 4/26/2016: no fracture or dislocation. The right hip joint spaces normal and symmetric with the left side. Enthesophytes are seen along either iliac crest and there is relatively severe bilateral facet hypertrophy at L5-S1. The sacroiliac joints appear grossly normal.     Right Femur 4/26/2016: the patient is status post prior placement of a 3 component right total knee prosthesis. From this examination a full assessment of the prosthesis is limited. Grossly this examination is negative for a loosening of the prosthetic components, heterotopic ossification, or additional complications of the prosthesis. The joint spaces of the right hip are well maintained without significant osteoarthritis. This examination is negative for a fracture or an insufficiency fracture of the proximal femur. This examination examination is negative for focal lytic or blastic lesions of the right femur. CT Imaging    CT Abdomen and Pelvis without contrast 7/31/2017:there are linear densities at both lung bases suggesting scarring or subsegmental atelectasis. The lung bases are otherwise clear no pleural effusion is seen. The liver spleen and pancreas are unremarkable. No renal stone or mass is seen. No ureteral dilatation or stone is seen. The urinary bladder is unremarkable. A urinary catheter is noted with tip within the urinary bladder. Small calcifications within the uterus are again noted. The uterus and pelvic adnexa are otherwise unremarkable. No dilated bowel or free air or free fluid is seen. Specifically there is no evidence of retroperitoneal or intra-abdominal hemorrhage. An inferior vena cava filter is noted. MR Imaging    MRI Lumbar Spine with and without contrast 4/26/2016: study is suboptimal secondary to patient's body habitus. T12-L1: Normal for age. L1-L2: Normal for age. L2-L3: There is mild facet joint arthropathy.  L3-L4: There is mild disc disease with concentric bulge. Moderate to severe facet arthropathy is present with bony hypertrophy and ligamentous thickening. There is moderate central canal stenosis and lateral recess narrowing. Bilateral foraminal narrowing is present. There is absence of fat within the right neural foramen suggesting involvement of the exiting L3 nerve root. L4-L5: There is mild disc disease with loss of disc height. Moderate to severe facet arthropathy is present with bony hypertrophy and ligamentous thickening. There is moderate central canal stenosis primarily in a transverse direction. Severe lateral recess narrowing is present with moderate bilateral foraminal narrowing. Absence of fat within the right neural foramen suggests involvement of the exiting right L4 nerve root. L5-S1: There is mild disc degeneration with loss of disc height. Moderate to severe facet arthropathy is present with bony hypertrophy and ligamentous thickening. No significant canal stenosis. There is mild lateral recess narrowing. Bilateral neural foraminal narrowing is also present, right greater than left. Absence of fat within the right neural foramen suggests involvement of the exiting right L5 nerve root. Visualized portions of the sacrum are normal. There is no abnormal enhancement. The conus is normal in contour and signal characteristics. Paraspinal soft tissues are unremarkable. DXA     DXA 1/11/23:   Femoral Neck Left: . Bone mineral density (gm/cm2): 1.019 g/cm? .  % of peak bone mass: 98.0%.  % for age matched controls: 99.0%. T-score: -0.1 . Z-score: -0.1 . Femoral Neck Right: . Bone mineral density (gm/cm2): 0.987 g/cm? .  % of peak bone mass: 95.0%.  % for age matched controls:  95.0%. T-score: -0.4 . Z-score: -0.3 . Total Hip Left: . Bone mineral density (gm/cm2): 1.058 g/cm? .  % of peak bone mass: 105.0%.  % for age matched controls: 101.0%. T-score: 0.4 .  Z-score: 0.1 . Total Hip Right: .   Bone mineral density (gm/cm2): 0.995 g/cm? .  % of peak bone mass: 99.0%.  % for age matched controls:  95.0%. T-score: -0.1 . Z-score: -0.4 .     33% Radius Left: . Bone mineral density (gm/cm2): 0.714 g/cm? .  % of peak bone mass: 82.0%.  % for age matched controls:  92.0%. T-score: -1.8 .  Z-score: -0.7 . DXA 6/01/2020: (excluded None) lumbar spine L1-L4 T score 1.7 (BMD 1.402 g/cm2), left femoral neck T score: -0.6 (0.864 g/cm2), left total hip T score: 0.2 (1.027 g/cm2), right femoral neck T score: -1.3 (0.864 g/cm2), right total hip T score: -0.7 (0.921 g/cm2), and distal one third left radius T score -3.3 (BMD 0.588 g/cm2). FRAX score 6.6 % probability in 10 years for major osteoporotic fracture and 0.7 % 10 year probability of hip fracture. DXA 8/28/2017: (excluded None) showed lumbar spine L1-L4 T score 1.2 (BMD 1.345 g/cm2), left femoral neck T score -0.9 (0.919 g/cm2), left total hip T score: 0.1 (1.020 g/cm2), right femoral neck T score: -0.4 (0.977 g/cm2), right total hip T score: -0.6 (0.938 g/cm2), and distal one third left radius T score N/A (BMD N/A g/cm2). FRAX score 5.8 % probability in 10 years for major osteoporotic fracture and 0.4 % 10 year probability of hip fracture. ASSESSMENT AND PLAN    This is a follow up visit for Ms. Roseann Lackey for seropositive erosive rheumatoid arthritis, with still low disease activity on methotrexate and weekly Actemra. Reducing Actemra injections to every other week, continuing methotrexate 20mg weekly. 1. Glenohumeral arthritis, left  - REFERRAL TO PHYSICAL THERAPY    2. Tendinitis of right rotator cuff  - REFERRAL TO PHYSICAL THERAPY    3. Morbid obesity with BMI of 45.0-49.9, adult ([facility]  -Reviewed the fourfold reduction in knee forces for every unit of weight lost (Arthritis Rheum. 2005 Jul;52(7):2026-32)     4.  Seropositive rheumatoid arthritis of multiple sites (HCC)  - Cont methotrexate 20mg subQ weekly  - C REACTIVE PROTEIN, QT; Future  - CBC WITH AUTOMATED DIFF; Future  - METABOLIC PANEL, COMPREHENSIVE; Future  - SED RATE (ESR); Future  - tocilizumab (Actemra) 162 mg/0.9 mL syringe; 0.9 mL by SubCUTAneous route every fourteen (14) days. Dispense: 1.8 mL; Refill: 11    5. Long-term use of immunosuppressant medication  - CBC WITH AUTOMATED DIFF; Future  - METABOLIC PANEL, COMPREHENSIVE; Future    6. Other osteoporosis without current pathological fracture  - Cont Prolia injections q6mo, due next 1/23  - DEXA BONE DENSITY STUDY AXIAL; Future      There are no Patient Instructions on file for this visit. TODAY'S ORDERS    No orders of the defined types were placed in this encounter.       Future Appointments   Date Time Provider Constance Abreu   7/19/2023  1:00 PM [id number]     Face to face time: minutes  Note preparation and records review day of service: minutes  Total provider time day of service: minutes

## 2023-03-14 ENCOUNTER — TELEPHONE (OUTPATIENT)
Dept: RHEUMATOLOGY | Age: 73
End: 2023-03-14

## 2023-03-14 NOTE — TELEPHONE ENCOUNTER
Spoke with PT and informed them that Teddy Zepeda will no longer be at the practice effective immediately I did inform them that refills must be in by 6/30/23. Ref them to three rheumatologists.

## 2023-03-20 ENCOUNTER — TELEPHONE (OUTPATIENT)
Dept: RHEUMATOLOGY | Age: 73
End: 2023-03-20

## 2023-03-20 NOTE — TELEPHONE ENCOUNTER
PT called and stated that med records need to be sent to new rheum. I informed her that we need a signed medical release form to send any records and I instructed her how to fill it out. PT stated she understood.

## 2023-04-22 DIAGNOSIS — M05.79 SEROPOSITIVE RHEUMATOID ARTHRITIS OF MULTIPLE SITES (HCC): Primary | ICD-10-CM

## 2023-04-22 DIAGNOSIS — Z79.60 LONG-TERM USE OF IMMUNOSUPPRESSANT MEDICATION: ICD-10-CM

## 2023-04-23 DIAGNOSIS — M05.79 SEROPOSITIVE RHEUMATOID ARTHRITIS OF MULTIPLE SITES (HCC): Primary | ICD-10-CM

## 2023-04-24 DIAGNOSIS — M05.79 SEROPOSITIVE RHEUMATOID ARTHRITIS OF MULTIPLE SITES (HCC): ICD-10-CM

## 2023-04-24 DIAGNOSIS — M05.79 SEROPOSITIVE RHEUMATOID ARTHRITIS OF MULTIPLE SITES (HCC): Primary | ICD-10-CM

## 2023-04-24 DIAGNOSIS — Z79.60 LONG-TERM USE OF IMMUNOSUPPRESSANT MEDICATION: ICD-10-CM

## 2023-04-24 DIAGNOSIS — M81.0 AGE-RELATED OSTEOPOROSIS WITHOUT CURRENT PATHOLOGICAL FRACTURE: Primary | ICD-10-CM

## 2023-04-28 DIAGNOSIS — M05.79 SEROPOSITIVE RHEUMATOID ARTHRITIS OF MULTIPLE SITES (HCC): Primary | ICD-10-CM

## 2023-04-28 DIAGNOSIS — Z79.60 LONG-TERM USE OF IMMUNOSUPPRESSANT MEDICATION: ICD-10-CM

## 2023-05-29 RX ORDER — ERGOCALCIFEROL 1.25 MG/1
50000 CAPSULE ORAL
COMMUNITY
Start: 2022-08-11 | End: 2023-06-27

## 2023-05-29 RX ORDER — SENNOSIDES 8.6 MG
650 CAPSULE ORAL PRN
COMMUNITY

## 2023-05-29 RX ORDER — HYDROCHLOROTHIAZIDE 25 MG/1
TABLET ORAL
COMMUNITY
Start: 2022-10-05

## 2023-05-29 RX ORDER — FOLIC ACID 1 MG/1
1 TABLET ORAL DAILY
COMMUNITY
Start: 2022-07-20

## 2023-05-29 RX ORDER — METOPROLOL TARTRATE 50 MG/1
TABLET, FILM COATED ORAL 2 TIMES DAILY
COMMUNITY
Start: 2016-12-27

## 2023-05-29 RX ORDER — DENOSUMAB 60 MG/ML
60 INJECTION SUBCUTANEOUS
COMMUNITY

## 2023-05-29 RX ORDER — TOCILIZUMAB 180 MG/ML
162 INJECTION, SOLUTION SUBCUTANEOUS
COMMUNITY
Start: 2022-11-23

## 2023-06-26 NOTE — TELEPHONE ENCOUNTER
Last visit 01/18/23  Patient has prolia injection on 7/19/23   Lab Results   Component Value Date     04/11/2023    K 3.6 04/11/2023     04/11/2023    CO2 29 04/11/2023    BUN 10 04/11/2023    CREATININE 0.86 04/11/2023    GLUCOSE 102 (H) 04/11/2023    CALCIUM 9.0 04/11/2023    PROT 7.5 04/11/2023    LABALBU 3.7 04/11/2023    BILITOT 0.5 04/11/2023    ALKPHOS 42 (L) 04/11/2023    AST 18 04/11/2023    ALT 14 04/11/2023    LABGLOM 75 08/31/2022    GFRAA >60 07/12/2022    AGRATIO 1.0 (L) 04/11/2023    GLOB 3.8 04/11/2023     Lab Results   Component Value Date    WBC 2.6 (L) 04/11/2023    HGB 11.5 04/11/2023    HCT 34.8 (L) 04/11/2023    MCV 98.3 04/11/2023     04/11/2023

## 2023-06-27 RX ORDER — ERGOCALCIFEROL 1.25 MG/1
CAPSULE ORAL
Qty: 12 CAPSULE | Refills: 3 | Status: SHIPPED | OUTPATIENT
Start: 2023-06-27

## 2023-06-29 ENCOUNTER — TELEPHONE (OUTPATIENT)
Age: 73
End: 2023-06-29

## 2023-06-29 DIAGNOSIS — M81.0 AGE-RELATED OSTEOPOROSIS WITHOUT CURRENT PATHOLOGICAL FRACTURE: Primary | ICD-10-CM

## 2023-07-03 LAB
ALBUMIN SERPL-MCNC: 3.8 G/DL (ref 3.5–5)
ALBUMIN/GLOB SERPL: 1 (ref 1.1–2.2)
ALP SERPL-CCNC: 66 U/L (ref 45–117)
ALT SERPL-CCNC: 12 U/L (ref 12–78)
ANION GAP SERPL CALC-SCNC: 6 MMOL/L (ref 5–15)
AST SERPL-CCNC: 15 U/L (ref 15–37)
BASOPHILS # BLD: 0 K/UL (ref 0–0.1)
BASOPHILS NFR BLD: 1 % (ref 0–1)
BILIRUB SERPL-MCNC: 0.8 MG/DL (ref 0.2–1)
BUN SERPL-MCNC: 12 MG/DL (ref 6–20)
BUN/CREAT SERPL: 14 (ref 12–20)
CALCIUM SERPL-MCNC: 9.6 MG/DL (ref 8.5–10.1)
CHLORIDE SERPL-SCNC: 105 MMOL/L (ref 97–108)
CO2 SERPL-SCNC: 27 MMOL/L (ref 21–32)
CREAT SERPL-MCNC: 0.85 MG/DL (ref 0.55–1.02)
CRP SERPL HS-MCNC: >9.5 MG/L
DIFFERENTIAL METHOD BLD: ABNORMAL
EOSINOPHIL # BLD: 0 K/UL (ref 0–0.4)
EOSINOPHIL NFR BLD: 1 % (ref 0–7)
ERYTHROCYTE [DISTWIDTH] IN BLOOD BY AUTOMATED COUNT: 13.2 % (ref 11.5–14.5)
ERYTHROCYTE [SEDIMENTATION RATE] IN BLOOD: 65 MM/HR (ref 0–30)
GLOBULIN SER CALC-MCNC: 4 G/DL (ref 2–4)
GLUCOSE SERPL-MCNC: 104 MG/DL (ref 65–100)
HCT VFR BLD AUTO: 33.7 % (ref 35–47)
HGB BLD-MCNC: 11.3 G/DL (ref 11.5–16)
IMM GRANULOCYTES # BLD AUTO: 0 K/UL (ref 0–0.04)
IMM GRANULOCYTES NFR BLD AUTO: 0 % (ref 0–0.5)
LYMPHOCYTES # BLD: 0.5 K/UL (ref 0.8–3.5)
LYMPHOCYTES NFR BLD: 15 % (ref 12–49)
MCH RBC QN AUTO: 32 PG (ref 26–34)
MCHC RBC AUTO-ENTMCNC: 33.5 G/DL (ref 30–36.5)
MCV RBC AUTO: 95.5 FL (ref 80–99)
MONOCYTES # BLD: 0.3 K/UL (ref 0–1)
MONOCYTES NFR BLD: 8 % (ref 5–13)
NEUTS SEG # BLD: 2.6 K/UL (ref 1.8–8)
NEUTS SEG NFR BLD: 75 % (ref 32–75)
NRBC # BLD: 0 K/UL (ref 0–0.01)
NRBC BLD-RTO: 0 PER 100 WBC
PLATELET # BLD AUTO: 226 K/UL (ref 150–400)
PMV BLD AUTO: 9.8 FL (ref 8.9–12.9)
POTASSIUM SERPL-SCNC: 3.3 MMOL/L (ref 3.5–5.1)
PROT SERPL-MCNC: 7.8 G/DL (ref 6.4–8.2)
RBC # BLD AUTO: 3.53 M/UL (ref 3.8–5.2)
RBC MORPH BLD: ABNORMAL
SODIUM SERPL-SCNC: 138 MMOL/L (ref 136–145)
WBC # BLD AUTO: 3.4 K/UL (ref 3.6–11)

## 2023-07-05 DIAGNOSIS — Z79.60 LONG TERM (CURRENT) USE OF UNSPECIFIED IMMUNOMODULATORS AND IMMUNOSUPPRESSANTS: ICD-10-CM

## 2023-07-05 DIAGNOSIS — M05.79 RHEUMATOID ARTHRITIS WITH RHEUMATOID FACTOR OF MULTIPLE SITES WITHOUT ORGAN OR SYSTEMS INVOLVEMENT (HCC): Primary | ICD-10-CM

## 2023-07-05 NOTE — TELEPHONE ENCOUNTER
Last visit 01/18/23  Lab Results   Component Value Date     07/03/2023    K 3.3 (L) 07/03/2023     07/03/2023    CO2 27 07/03/2023    BUN 12 07/03/2023    CREATININE 0.85 07/03/2023    GLUCOSE 104 (H) 07/03/2023    CALCIUM 9.6 07/03/2023    PROT 7.8 07/03/2023    LABALBU 3.8 07/03/2023    BILITOT 0.8 07/03/2023    ALKPHOS 66 07/03/2023    AST 15 07/03/2023    ALT 12 07/03/2023    LABGLOM >60 07/03/2023    GFRAA >60 07/12/2022    AGRATIO 1.0 (L) 04/11/2023    GLOB 4.0 07/03/2023     Lab Results   Component Value Date    WBC 3.4 (L) 07/03/2023    HGB 11.3 (L) 07/03/2023    HCT 33.7 (L) 07/03/2023    MCV 95.5 07/03/2023     07/03/2023

## 2023-07-07 RX ORDER — METHOTREXATE 25 MG/ML
INJECTION INTRA-ARTERIAL; INTRAMUSCULAR; INTRATHECAL; INTRAVENOUS
Qty: 24 ML | Refills: 0 | Status: SHIPPED | OUTPATIENT
Start: 2023-07-07

## 2023-07-19 ENCOUNTER — NURSE ONLY (OUTPATIENT)
Age: 73
End: 2023-07-19
Payer: MEDICARE

## 2023-07-19 VITALS
DIASTOLIC BLOOD PRESSURE: 70 MMHG | SYSTOLIC BLOOD PRESSURE: 106 MMHG | RESPIRATION RATE: 16 BRPM | HEART RATE: 82 BPM | TEMPERATURE: 98 F | OXYGEN SATURATION: 98 %

## 2023-07-19 DIAGNOSIS — M81.0 AGE-RELATED OSTEOPOROSIS WITHOUT CURRENT PATHOLOGICAL FRACTURE: Primary | ICD-10-CM

## 2023-07-19 PROCEDURE — 96372 THER/PROPH/DIAG INJ SC/IM: CPT | Performed by: PEDIATRICS

## 2023-07-19 PROCEDURE — PBSHW PBB SHADOW CHARGE: Performed by: PEDIATRICS

## 2023-07-19 RX ADMIN — DENOSUMAB 60 MG: 60 INJECTION SUBCUTANEOUS at 13:09

## 2023-07-19 ASSESSMENT — PATIENT HEALTH QUESTIONNAIRE - PHQ9
1. LITTLE INTEREST OR PLEASURE IN DOING THINGS: 0
SUM OF ALL RESPONSES TO PHQ QUESTIONS 1-9: 0
SUM OF ALL RESPONSES TO PHQ QUESTIONS 1-9: 0
2. FEELING DOWN, DEPRESSED OR HOPELESS: 0
SUM OF ALL RESPONSES TO PHQ QUESTIONS 1-9: 0
SUM OF ALL RESPONSES TO PHQ QUESTIONS 1-9: 0
SUM OF ALL RESPONSES TO PHQ9 QUESTIONS 1 & 2: 0

## 2023-07-19 ASSESSMENT — PAIN DESCRIPTION - LOCATION: LOCATION: BACK

## 2023-07-19 ASSESSMENT — PAIN DESCRIPTION - ORIENTATION: ORIENTATION: UPPER;LOWER

## 2023-07-19 ASSESSMENT — PAIN SCALES - GENERAL: PAINLEVEL_OUTOF10: 10

## 2023-10-12 ENCOUNTER — TELEPHONE (OUTPATIENT)
Age: 73
End: 2023-10-12

## 2023-10-12 NOTE — TELEPHONE ENCOUNTER
Ty from [facility] called and requested Dexa scan results and last two office notes.      C:[phone]

## 2024-12-19 ENCOUNTER — TELEPHONE (OUTPATIENT)
Age: 74
End: 2024-12-19

## 2024-12-19 NOTE — TELEPHONE ENCOUNTER
Reached out to I to request records from PT's visit with Dr. Lopez around 2020. [facility] stated they would fax records over.

## 2025-03-03 ENCOUNTER — TRANSCRIBE ORDERS (OUTPATIENT)
Facility: HOSPITAL | Age: 75
End: 2025-03-03

## 2025-03-03 DIAGNOSIS — M81.0 AGE-RELATED OSTEOPOROSIS WITHOUT CURRENT PATHOLOGICAL FRACTURE: Primary | ICD-10-CM

## 2025-03-19 ENCOUNTER — HOSPITAL ENCOUNTER (OUTPATIENT)
Facility: HOSPITAL | Age: 75
Discharge: HOME | End: 2025-03-22
Attending: INTERNAL MEDICINE
Payer: MEDICARE

## 2025-03-19 VITALS — HEIGHT: 66 IN | BODY MASS INDEX: 39.86 KG/M2 | WEIGHT: 248 LBS

## 2025-03-19 DIAGNOSIS — M81.0 AGE-RELATED OSTEOPOROSIS WITHOUT CURRENT PATHOLOGICAL FRACTURE: ICD-10-CM

## 2025-03-19 PROCEDURE — 77080 DXA BONE DENSITY AXIAL: CPT
